# Patient Record
Sex: FEMALE | Race: WHITE | NOT HISPANIC OR LATINO | Employment: FULL TIME | URBAN - METROPOLITAN AREA
[De-identification: names, ages, dates, MRNs, and addresses within clinical notes are randomized per-mention and may not be internally consistent; named-entity substitution may affect disease eponyms.]

---

## 2017-07-14 ENCOUNTER — ALLSCRIPTS OFFICE VISIT (OUTPATIENT)
Dept: OTHER | Facility: OTHER | Age: 42
End: 2017-07-14

## 2017-12-07 ENCOUNTER — ALLSCRIPTS OFFICE VISIT (OUTPATIENT)
Dept: OTHER | Facility: OTHER | Age: 42
End: 2017-12-07

## 2017-12-08 NOTE — PROGRESS NOTES
Assessment    1  Acute upper respiratory infection (465 9) (J06 9)    Plan  Acute upper respiratory infection    · Amoxicillin 500 MG Oral Capsule; TAKE 2 CAPSULES TWICE DAILY UNTIL GONE   · Fluticasone Propionate 50 MCG/ACT Nasal Suspension; USE 2 SPRAYS INEACH NOSTRIL ONCE DAILY   · Ventolin  (90 Base) MCG/ACT Inhalation Aerosol Solution; INHALE 2PUFFS EVERY 6 HOURS AS NEEDED FOR WHEEZING   · Follow Up if Not Better Evaluation and Treatment  Follow-up  Status: Complete  Done:[id number]  PMH: Encounter for screening for malignant neoplasm of colon, PMH: Exercisecounseling, PMH: Eye irritation, FamHx: Family history of cardiac disorder, FamHx:Family history of colon cancer, PMH: Generalized anxiety disorder, PMH: History ofallergic rhinitis, PMH: History of Papanicolaou smear, PMH: History of scabies, PMH:History of vertigo, PMH: History of viral infection, PMH: Screening for depression    · (1) PAP SMEAR CONVENTIONAL; Status:Temporary Deferral - Patient reports itemrecently done,calling for report;    12/7/2018  Maturation index required? : No    Discussion/Summary    Rx for fluticasone nose spray and Ventolin  Push fluids  Get a room humidifier  May get Robitussin DM over the counter  If symptoms continue, start amoxicillin  Call us if any problems  Possible side effects of new medications were reviewed with the patient/guardian today  The treatment plan was reviewed with the patient/guardian  The patient/guardian understands and agrees with the treatment plan      Chief Complaint    1  Cold Symptoms  Cold      History of Present Illness  HPI: Cold for the past 2 1/2 weeks  Past couple days cough with yellow sputum  No F/C  Patient 35 weeks pregnant  Cold Symptoms:   Christi Hopson presents with complaints of constant episodes of moderate cold symptoms  Episodes started about 2 weeks ago  She is currently experiencing cold symptoms  Symptoms are worsening        Review of Systems   Constitutional: No fever, no chills, feels well, no tiredness, no recent weight gain or loss  ENT: as noted in HPI  Cardiovascular: no complaints of slow or fast heart rate, no chest pain, no palpitations, no leg claudication or lower extremity edema  Respiratory: as noted in HPI  Gastrointestinal: no complaints of abdominal pain, no constipation, no nausea or diarrhea, no vomiting, no bloody stools  Genitourinary: no complaints of dysuria, no incontinence, no pelvic pain, no dysmenorrhea, no vaginal discharge or abnormal vaginal bleeding  Active Problems  1  Acute sinusitis (461 9) (J01 90)   2  Increased BMI (783 9) (R63 8)   3  Need for diphtheria-tetanus-pertussis (Tdap) vaccine (V06 1) (Z23)    Past Medical History    1  Acute upper respiratory infection (465 9) (J06 9)   2  Acute upper respiratory infection (465 9) (J06 9)   3  History of Acute upper respiratory infection (465 9) (J06 9)   4  History of Acute UTI (599 0) (N39 0)   5  History of Bacterial vaginosis (616 10,041 9) (N76 0,B96 89)   6  History of Bug bite (919 4,E906 4) (W57 XXXA)   7  History of Cellulitis of right lower extremity (682 6) (L03 115)   8  History of Dysfunction of left eustachian tube (381 81) (H69 82)   9  History of Encounter for mammogram to establish baseline mammogram (V76 12) (Z12 31)   10  History of Encounter for screening for cardiovascular disorders (V81 2) (Z13 6)   11  History of Encounter for screening for malignant neoplasm of colon (V76 51) (Z12 11)   12  History of Exercise counseling (V65 41) (Z71 82)   13  History of Eye irritation (379 99) (H57 8)   14  History of Generalized anxiety disorder (300 02) (F41 1)   15  History of allergic rhinitis (V12 69) (Z87 09)   16  History of Papanicolaou smear (V45 89) (Z98 890)   17  History of scabies (V12 09) (Z86 19)   18  History of vertigo (V12 49) (Z87 898)   19  History of viral infection (V12 09) (Z86 19)   20   History of Screening for depression (V79 0) (Z13 89)  Active Problems And Past Medical History Reviewed: The active problems and past medical history were reviewed and updated today  Family History  Mother    1  Family history of colon cancer (V16 0) (Z80 0)   2  Family history of polyps in the colon (V18 51) (Z83 71)  Father    3  Family history of cardiac disorder (V17 49) (Z82 49)  Maternal Grandmother    4  Family history of colon cancer (V16 0) (Z80 0)  Maternal Cousin    5  Family history of colon cancer (V16 0) (Z80 0)  Family History Reviewed: The family history was reviewed and updated today  Social History     · Being A Social Drinker   · Former smoker (P20 11) (P85 292)  The social history was reviewed and updated today  The social history was reviewed and is unchanged  Surgical History  Surgical History Reviewed: The surgical history was reviewed and updated today  Current Meds   1  Meclizine HCl - 25 MG Oral Tablet; TAKE 1 TABLET 4 TIMES DAILY AS NEEDED FOR DIZZINESS; Therapy: 17HKG6286 to (Evaluate:38Pfi9527)  Requested for: 75Zoq7784; Last Rx:97Wtz3078 Ordered    The medication list was reviewed and updated today  Allergies  1  No Known Drug Allergies    Vitals   Recorded: 08OAI5868 01:40PM   Temperature 00 6 F   Systolic 606   Diastolic 82   Height 5 ft 10 in   Weight 193 lb 4 oz   BMI Calculated 27 73   BSA Calculated 2 06       Physical Exam   Constitutional  General appearance: No acute distress, well appearing and well nourished  Ears, Nose, Mouth, and Throat  Otoscopic examination: Tympanic membranes translucent with normal light reflex  Canals patent without erythema  Nasal mucosa, septum, and turbinates: Abnormal   There was clear rhinorrhea from both nares  The bilateral nasal mucosa was boggy  Oropharynx: Abnormal   The posterior pharynx Clear postnasal drip  Pulmonary  Respiratory effort: No increased work of breathing or signs of respiratory distress  Auscultation of lungs: Clear to auscultation  Cardiovascular  Auscultation of heart: Normal rate and rhythm, normal S1 and S2, without murmurs     Examination of extremities for edema and/or varicosities: Normal    Psychiatric  Orientation to person, place, and time: Normal    Mood and affect: Normal          Signatures   Electronically signed by : Miracle Mack DO; Dec  7 2017  2:07PM EST                       (Author)

## 2018-01-13 VITALS
WEIGHT: 176.5 LBS | HEIGHT: 70 IN | BODY MASS INDEX: 25.27 KG/M2 | TEMPERATURE: 97.5 F | SYSTOLIC BLOOD PRESSURE: 102 MMHG | DIASTOLIC BLOOD PRESSURE: 68 MMHG

## 2018-01-22 VITALS
WEIGHT: 193.25 LBS | HEIGHT: 70 IN | BODY MASS INDEX: 27.67 KG/M2 | DIASTOLIC BLOOD PRESSURE: 82 MMHG | TEMPERATURE: 97.2 F | SYSTOLIC BLOOD PRESSURE: 120 MMHG

## 2018-10-10 ENCOUNTER — TELEPHONE (OUTPATIENT)
Dept: FAMILY MEDICINE CLINIC | Facility: CLINIC | Age: 43
End: 2018-10-10

## 2018-10-10 DIAGNOSIS — R30.0 DYSURIA: Primary | ICD-10-CM

## 2018-10-10 RX ORDER — NITROFURANTOIN 25; 75 MG/1; MG/1
100 CAPSULE ORAL 2 TIMES DAILY
Qty: 10 CAPSULE | Refills: 0 | Status: SHIPPED | OUTPATIENT
Start: 2018-10-10 | End: 2018-10-15

## 2019-01-08 ENCOUNTER — TRANSCRIBE ORDERS (OUTPATIENT)
Dept: LAB | Facility: CLINIC | Age: 44
End: 2019-01-08

## 2019-01-08 ENCOUNTER — APPOINTMENT (OUTPATIENT)
Dept: LAB | Facility: CLINIC | Age: 44
End: 2019-01-08

## 2019-01-08 DIAGNOSIS — Z02.1 PRE-EMPLOYMENT EXAMINATION: Primary | ICD-10-CM

## 2019-01-08 DIAGNOSIS — Z02.1 PRE-EMPLOYMENT EXAMINATION: ICD-10-CM

## 2019-01-08 LAB — RUBV IGG SERPL IA-ACNC: 34.9 IU/ML

## 2019-01-08 PROCEDURE — 86735 MUMPS ANTIBODY: CPT

## 2019-01-08 PROCEDURE — 86765 RUBEOLA ANTIBODY: CPT

## 2019-01-08 PROCEDURE — 86762 RUBELLA ANTIBODY: CPT

## 2019-01-08 PROCEDURE — 86480 TB TEST CELL IMMUN MEASURE: CPT

## 2019-01-08 PROCEDURE — 86787 VARICELLA-ZOSTER ANTIBODY: CPT

## 2019-01-08 PROCEDURE — 87340 HEPATITIS B SURFACE AG IA: CPT

## 2019-01-08 PROCEDURE — 36415 COLL VENOUS BLD VENIPUNCTURE: CPT

## 2019-01-09 LAB — HBV SURFACE AG SER QL: NORMAL

## 2019-01-10 LAB
GAMMA INTERFERON BACKGROUND BLD IA-ACNC: 0.01 IU/ML
M TB IFN-G BLD-IMP: NEGATIVE
M TB IFN-G CD4+ BCKGRND COR BLD-ACNC: 0 IU/ML
M TB IFN-G CD4+ BCKGRND COR BLD-ACNC: 0 IU/ML
MEV IGG SER QL: ABNORMAL
MITOGEN IGNF BCKGRD COR BLD-ACNC: 6.86 IU/ML
MUV IGG SER QL: ABNORMAL
VZV IGG SER IA-ACNC: NORMAL

## 2019-04-02 ENCOUNTER — APPOINTMENT (OUTPATIENT)
Dept: LAB | Facility: MEDICAL CENTER | Age: 44
End: 2019-04-02
Payer: COMMERCIAL

## 2019-04-02 ENCOUNTER — OFFICE VISIT (OUTPATIENT)
Dept: FAMILY MEDICINE CLINIC | Facility: CLINIC | Age: 44
End: 2019-04-02
Payer: COMMERCIAL

## 2019-04-02 VITALS
BODY MASS INDEX: 25.48 KG/M2 | HEIGHT: 70 IN | DIASTOLIC BLOOD PRESSURE: 72 MMHG | WEIGHT: 178 LBS | SYSTOLIC BLOOD PRESSURE: 120 MMHG

## 2019-04-02 DIAGNOSIS — R10.13 EPIGASTRIC PAIN: Primary | ICD-10-CM

## 2019-04-02 LAB
ALBUMIN SERPL BCP-MCNC: 4 G/DL (ref 3.5–5)
ALP SERPL-CCNC: 61 U/L (ref 46–116)
ALT SERPL W P-5'-P-CCNC: 22 U/L (ref 12–78)
ANION GAP SERPL CALCULATED.3IONS-SCNC: 3 MMOL/L (ref 4–13)
AST SERPL W P-5'-P-CCNC: 11 U/L (ref 5–45)
BASOPHILS # BLD AUTO: 0.03 THOUSANDS/ΜL (ref 0–0.1)
BASOPHILS NFR BLD AUTO: 0 % (ref 0–1)
BILIRUB SERPL-MCNC: 0.68 MG/DL (ref 0.2–1)
BUN SERPL-MCNC: 10 MG/DL (ref 5–25)
CALCIUM SERPL-MCNC: 9.2 MG/DL (ref 8.3–10.1)
CHLORIDE SERPL-SCNC: 106 MMOL/L (ref 100–108)
CO2 SERPL-SCNC: 28 MMOL/L (ref 21–32)
CREAT SERPL-MCNC: 0.64 MG/DL (ref 0.6–1.3)
EOSINOPHIL # BLD AUTO: 0.07 THOUSAND/ΜL (ref 0–0.61)
EOSINOPHIL NFR BLD AUTO: 1 % (ref 0–6)
ERYTHROCYTE [DISTWIDTH] IN BLOOD BY AUTOMATED COUNT: 11.9 % (ref 11.6–15.1)
GFR SERPL CREATININE-BSD FRML MDRD: 110 ML/MIN/1.73SQ M
GLUCOSE SERPL-MCNC: 90 MG/DL (ref 65–140)
HCT VFR BLD AUTO: 46.1 % (ref 34.8–46.1)
HGB BLD-MCNC: 14.7 G/DL (ref 11.5–15.4)
IMM GRANULOCYTES # BLD AUTO: 0.03 THOUSAND/UL (ref 0–0.2)
IMM GRANULOCYTES NFR BLD AUTO: 0 % (ref 0–2)
LIPASE SERPL-CCNC: 137 U/L (ref 73–393)
LYMPHOCYTES # BLD AUTO: 1.55 THOUSANDS/ΜL (ref 0.6–4.47)
LYMPHOCYTES NFR BLD AUTO: 21 % (ref 14–44)
MCH RBC QN AUTO: 30.1 PG (ref 26.8–34.3)
MCHC RBC AUTO-ENTMCNC: 31.9 G/DL (ref 31.4–37.4)
MCV RBC AUTO: 94 FL (ref 82–98)
MONOCYTES # BLD AUTO: 0.79 THOUSAND/ΜL (ref 0.17–1.22)
MONOCYTES NFR BLD AUTO: 11 % (ref 4–12)
NEUTROPHILS # BLD AUTO: 4.82 THOUSANDS/ΜL (ref 1.85–7.62)
NEUTS SEG NFR BLD AUTO: 67 % (ref 43–75)
NRBC BLD AUTO-RTO: 0 /100 WBCS
PLATELET # BLD AUTO: 255 THOUSANDS/UL (ref 149–390)
PMV BLD AUTO: 11.1 FL (ref 8.9–12.7)
POTASSIUM SERPL-SCNC: 3.8 MMOL/L (ref 3.5–5.3)
PROT SERPL-MCNC: 8.1 G/DL (ref 6.4–8.2)
RBC # BLD AUTO: 4.89 MILLION/UL (ref 3.81–5.12)
SODIUM SERPL-SCNC: 137 MMOL/L (ref 136–145)
WBC # BLD AUTO: 7.29 THOUSAND/UL (ref 4.31–10.16)

## 2019-04-02 PROCEDURE — 1036F TOBACCO NON-USER: CPT | Performed by: FAMILY MEDICINE

## 2019-04-02 PROCEDURE — 99213 OFFICE O/P EST LOW 20 MIN: CPT | Performed by: FAMILY MEDICINE

## 2019-04-02 PROCEDURE — 85025 COMPLETE CBC W/AUTO DIFF WBC: CPT | Performed by: FAMILY MEDICINE

## 2019-04-02 PROCEDURE — 83690 ASSAY OF LIPASE: CPT | Performed by: FAMILY MEDICINE

## 2019-04-02 PROCEDURE — 36415 COLL VENOUS BLD VENIPUNCTURE: CPT | Performed by: FAMILY MEDICINE

## 2019-04-02 PROCEDURE — 80053 COMPREHEN METABOLIC PANEL: CPT | Performed by: FAMILY MEDICINE

## 2019-04-02 PROCEDURE — 3008F BODY MASS INDEX DOCD: CPT | Performed by: FAMILY MEDICINE

## 2019-04-02 RX ORDER — ALBUTEROL SULFATE 90 UG/1
2 AEROSOL, METERED RESPIRATORY (INHALATION) EVERY 6 HOURS PRN
COMMUNITY
Start: 2013-04-01 | End: 2019-04-02

## 2019-04-02 RX ORDER — FLUTICASONE PROPIONATE 50 MCG
2 SPRAY, SUSPENSION (ML) NASAL DAILY
COMMUNITY
Start: 2015-05-01 | End: 2019-04-02

## 2019-04-02 RX ORDER — MECLIZINE HYDROCHLORIDE 25 MG/1
1 TABLET ORAL 4 TIMES DAILY PRN
COMMUNITY
Start: 2012-12-07 | End: 2019-04-02

## 2019-04-02 RX ORDER — RANITIDINE 150 MG/1
150 TABLET ORAL 2 TIMES DAILY
Qty: 60 TABLET | Refills: 3 | Status: SHIPPED | OUTPATIENT
Start: 2019-04-02 | End: 2020-06-17

## 2019-09-05 ENCOUNTER — TRANSCRIBE ORDERS (OUTPATIENT)
Dept: ADMINISTRATIVE | Facility: HOSPITAL | Age: 44
End: 2019-09-05

## 2019-09-05 ENCOUNTER — APPOINTMENT (OUTPATIENT)
Dept: LAB | Facility: HOSPITAL | Age: 44
End: 2019-09-05

## 2019-09-05 DIAGNOSIS — Z00.8 HEALTH EXAMINATION IN POPULATION SURVEY: Primary | ICD-10-CM

## 2019-09-05 DIAGNOSIS — Z00.8 HEALTH EXAMINATION IN POPULATION SURVEY: ICD-10-CM

## 2019-09-05 LAB
CHOLEST SERPL-MCNC: 180 MG/DL (ref 50–200)
EST. AVERAGE GLUCOSE BLD GHB EST-MCNC: 108 MG/DL
HBA1C MFR BLD: 5.4 % (ref 4.2–6.3)
HDLC SERPL-MCNC: 43 MG/DL (ref 40–60)
LDLC SERPL CALC-MCNC: 110 MG/DL (ref 0–100)
NONHDLC SERPL-MCNC: 137 MG/DL
TRIGL SERPL-MCNC: 135 MG/DL

## 2019-09-05 PROCEDURE — 36415 COLL VENOUS BLD VENIPUNCTURE: CPT

## 2019-09-05 PROCEDURE — 80061 LIPID PANEL: CPT

## 2019-09-05 PROCEDURE — 83036 HEMOGLOBIN GLYCOSYLATED A1C: CPT

## 2019-11-26 ENCOUNTER — OFFICE VISIT (OUTPATIENT)
Dept: FAMILY MEDICINE CLINIC | Facility: CLINIC | Age: 44
End: 2019-11-26
Payer: COMMERCIAL

## 2019-11-26 VITALS
BODY MASS INDEX: 25.48 KG/M2 | WEIGHT: 178 LBS | HEIGHT: 70 IN | SYSTOLIC BLOOD PRESSURE: 116 MMHG | DIASTOLIC BLOOD PRESSURE: 80 MMHG

## 2019-11-26 DIAGNOSIS — F41.9 ANXIETY: Primary | ICD-10-CM

## 2019-11-26 PROCEDURE — 99213 OFFICE O/P EST LOW 20 MIN: CPT | Performed by: FAMILY MEDICINE

## 2019-11-26 PROCEDURE — 1036F TOBACCO NON-USER: CPT | Performed by: FAMILY MEDICINE

## 2019-11-26 NOTE — PROGRESS NOTES
Assessment/Plan: We will start her on Zoloft 25 mg daily for couple days then 50 mg daily  Patient will call her work to see if she can have any time off to be with her mother  We will see her back in the next 3-4 weeks or p r n  Problem List Items Addressed This Visit        Other    Anxiety - Primary    Relevant Medications    sertraline (ZOLOFT) 50 mg tablet           Diagnoses and all orders for this visit:    Anxiety  -     sertraline (ZOLOFT) 50 mg tablet; Take 1 tablet (50 mg total) by mouth daily        No problem-specific Assessment & Plan notes found for this encounter  Subjective:      Patient ID: Gracie Barnett is a 40 y o  female  Patient here today for anxiety  Patient's mom is on hospice  Patient has not been sleeping well and having anxiety attacks  She denies any SI or HI  Anxiety   Presents for initial visit  Onset was 1 to 4 weeks ago  The problem has been gradually worsening  Symptoms include insomnia, nervous/anxious behavior and panic  Patient reports no suicidal ideas  Symptoms occur constantly  The severity of symptoms is moderate  The quality of sleep is poor  Nighttime awakenings: several      Past treatments include nothing  The following portions of the patient's history were reviewed and updated as appropriate:   She has a past medical history of Allergic rhinitis and Generalized anxiety disorder  ,  does not have any pertinent problems on file  ,   has no past surgical history on file  ,  family history includes Colon cancer in her cousin, maternal grandmother, and mother; Colon polyps in her mother; Heart disease in her father  ,   reports that she has quit smoking  She has never used smokeless tobacco  She reports that she drinks alcohol  She reports that she does not use drugs  ,  has No Known Allergies     Current Outpatient Medications   Medication Sig Dispense Refill    ranitidine (ZANTAC) 150 mg tablet Take 1 tablet (150 mg total) by mouth 2 (two) times a day (Patient not taking: Reported on 11/26/2019) 60 tablet 3    sertraline (ZOLOFT) 50 mg tablet Take 1 tablet (50 mg total) by mouth daily 30 tablet 1     No current facility-administered medications for this visit  Review of Systems   Constitutional: Negative  Respiratory: Negative  Cardiovascular: Negative  Gastrointestinal: Negative  Genitourinary: Negative  Psychiatric/Behavioral: Negative for suicidal ideas  The patient is nervous/anxious and has insomnia  Objective:  Vitals:    11/26/19 0848   BP: 116/80   Weight: 80 7 kg (178 lb)   Height: 5' 10" (1 778 m)     Body mass index is 25 54 kg/m²  Physical Exam   Constitutional: She is oriented to person, place, and time  She appears well-developed and well-nourished  No distress  HENT:   Head: Normocephalic and atraumatic  Eyes: Conjunctivae are normal    Neurological: She is alert and oriented to person, place, and time  Skin: She is not diaphoretic  Psychiatric: She has a normal mood and affect  Her behavior is normal  Judgment and thought content normal    Vitals reviewed

## 2019-12-19 ENCOUNTER — OFFICE VISIT (OUTPATIENT)
Dept: FAMILY MEDICINE CLINIC | Facility: CLINIC | Age: 44
End: 2019-12-19
Payer: COMMERCIAL

## 2019-12-19 VITALS
SYSTOLIC BLOOD PRESSURE: 128 MMHG | HEART RATE: 104 BPM | BODY MASS INDEX: 27.26 KG/M2 | OXYGEN SATURATION: 96 % | HEIGHT: 70 IN | DIASTOLIC BLOOD PRESSURE: 90 MMHG | WEIGHT: 190.4 LBS

## 2019-12-19 DIAGNOSIS — Z63.4 DEATH OF PARENT: ICD-10-CM

## 2019-12-19 DIAGNOSIS — Z12.4 ENCOUNTER FOR SCREENING FOR CERVICAL CANCER: ICD-10-CM

## 2019-12-19 DIAGNOSIS — Z80.49 FAMILY HISTORY OF UTERINE CANCER: ICD-10-CM

## 2019-12-19 DIAGNOSIS — Z12.31 ENCOUNTER FOR SCREENING MAMMOGRAM FOR BREAST CANCER: ICD-10-CM

## 2019-12-19 DIAGNOSIS — F43.21 GRIEF: Primary | ICD-10-CM

## 2019-12-19 PROCEDURE — 3008F BODY MASS INDEX DOCD: CPT | Performed by: PHYSICIAN ASSISTANT

## 2019-12-19 PROCEDURE — 99213 OFFICE O/P EST LOW 20 MIN: CPT | Performed by: PHYSICIAN ASSISTANT

## 2019-12-19 PROCEDURE — 1036F TOBACCO NON-USER: CPT | Performed by: PHYSICIAN ASSISTANT

## 2019-12-19 SDOH — SOCIAL STABILITY - SOCIAL INSECURITY: DISSAPEARANCE AND DEATH OF FAMILY MEMBER: Z63.4

## 2019-12-19 NOTE — PROGRESS NOTES
Assessment/Plan:    Problem List Items Addressed This Visit     None      Visit Diagnoses     Grief    -  Primary    Death of parent        Encounter for screening mammogram for breast cancer        Relevant Orders    Mammo screening bilateral w 3d & cad    Encounter for screening for cervical cancer         Relevant Orders    Ambulatory referral to Gynecology    Family history of uterine cancer        Relevant Orders    Ambulatory referral to Gynecology           Diagnoses and all orders for this visit:    Grief    Death of parent    Encounter for screening mammogram for breast cancer  -     Mammo screening bilateral w 3d & cad; Future    Encounter for screening for cervical cancer   -     Cancel: Ambulatory referral to Gynecology; Future  -     Ambulatory referral to Gynecology; Future    Family history of uterine cancer  -     Ambulatory referral to Gynecology; Future        Will excuse from work -, and she will take 3 bereavement days which will make her RTW date 20  Note stating the same provided to her today  Referral placed to GYN as she is due and she is interested in genetic testing for herself  Subjective:      Patient ID: Bishop Lopez is a 40 y o  female  Bobby Orn is here today due to grief of her mother's passing  She passed away yesterday from an aggressive form of uterine CA which had metastasized rapidly  Bobby Orn needs time off of work to grieve and take care of  arrangements  The following portions of the patient's history were reviewed and updated as appropriate:   She has a past medical history of Allergic rhinitis and Generalized anxiety disorder  ,  does not have any pertinent problems on file  ,   has no past surgical history on file  ,  family history includes Colon cancer in her cousin, maternal grandmother, and mother; Colon polyps in her mother; Heart disease in her father; Uterine cancer in her mother  ,   reports that she has quit smoking   She has never used smokeless tobacco  She reports that she drank alcohol  She reports that she does not use drugs  ,  has No Known Allergies     Current Outpatient Medications   Medication Sig Dispense Refill    ranitidine (ZANTAC) 150 mg tablet Take 1 tablet (150 mg total) by mouth 2 (two) times a day (Patient not taking: Reported on 11/26/2019) 60 tablet 3    sertraline (ZOLOFT) 50 mg tablet Take 1 tablet (50 mg total) by mouth daily (Patient not taking: Reported on 12/19/2019) 30 tablet 1     No current facility-administered medications for this visit  Review of Systems   Constitutional: Positive for appetite change  Negative for activity change, chills, diaphoresis, fatigue, fever and unexpected weight change  HENT: Negative for congestion, ear pain, postnasal drip, rhinorrhea, sinus pressure, sinus pain, sneezing, sore throat, tinnitus and voice change  Eyes: Negative for pain, redness and visual disturbance  Respiratory: Negative for cough, chest tightness, shortness of breath and wheezing  Cardiovascular: Negative for chest pain, palpitations and leg swelling  Gastrointestinal: Negative for abdominal pain, blood in stool, constipation, diarrhea, nausea and vomiting  Genitourinary: Negative for difficulty urinating, dysuria, frequency, hematuria and urgency  Musculoskeletal: Negative for arthralgias, back pain, gait problem, joint swelling, myalgias, neck pain and neck stiffness  Skin: Negative for color change, pallor, rash and wound  Neurological: Negative for dizziness, tremors, weakness, light-headedness and headaches  Psychiatric/Behavioral: Positive for dysphoric mood and sleep disturbance  Negative for self-injury and suicidal ideas  The patient is not nervous/anxious           Tearful, grieving         Objective:  Vitals:    12/19/19 0727   BP: 128/90   BP Location: Left arm   Patient Position: Sitting   Pulse: 104   SpO2: 96%   Weight: 86 4 kg (190 lb 6 4 oz)   Height: 5' 10" (1 778 m) Body mass index is 27 32 kg/m²  Physical Exam   Constitutional: She is oriented to person, place, and time  She appears well-developed and well-nourished  She appears distressed (tearful, grieving)  HENT:   Head: Normocephalic and atraumatic  Right Ear: Hearing, tympanic membrane, external ear and ear canal normal    Left Ear: Hearing, tympanic membrane, external ear and ear canal normal    Mouth/Throat: Uvula is midline, oropharynx is clear and moist and mucous membranes are normal  No oropharyngeal exudate  Eyes: Conjunctivae are normal  Right eye exhibits no discharge  Left eye exhibits no discharge  No scleral icterus  Neck: Neck supple  Carotid bruit is not present  No thyromegaly present  Cardiovascular: Normal rate, regular rhythm and normal heart sounds  No murmur heard  Pulmonary/Chest: Effort normal and breath sounds normal  No respiratory distress  She has no wheezes  Abdominal: Soft  Bowel sounds are normal  She exhibits no distension and no mass  There is no tenderness  There is no rebound and no guarding  Musculoskeletal: Normal range of motion  She exhibits no edema or tenderness  Lymphadenopathy:     She has no cervical adenopathy  Neurological: She is alert and oriented to person, place, and time  Skin: Skin is warm and dry  No rash noted  She is not diaphoretic  No erythema  Psychiatric: Judgment normal  Cognition and memory are normal  She exhibits a depressed mood  Vitals reviewed

## 2019-12-19 NOTE — LETTER
December 19, 2019     Patient: Frankey Kill   YOB: 1975   Date of Visit: 12/19/2019       To Whom it May Concern:    Rozina Cage is under my professional care  She was seen in my office on 12/19/2019  She will be excused from work 12/18/19-12/26/19, followed by 3 bereavement days 12/27/19, 12/30/19, and 12/31/19  She will return to work 1/2/20  If you have any questions or concerns, please don't hesitate to call           Sincerely,          Brandon iRng PA-C        CC: No Recipients

## 2020-06-11 ENCOUNTER — TELEPHONE (OUTPATIENT)
Dept: OTHER | Facility: OTHER | Age: 45
End: 2020-06-11

## 2020-06-12 ENCOUNTER — TELEPHONE (OUTPATIENT)
Dept: OTHER | Facility: OTHER | Age: 45
End: 2020-06-12

## 2020-06-17 ENCOUNTER — OFFICE VISIT (OUTPATIENT)
Dept: FAMILY MEDICINE CLINIC | Facility: CLINIC | Age: 45
End: 2020-06-17
Payer: COMMERCIAL

## 2020-06-17 VITALS
TEMPERATURE: 98.6 F | WEIGHT: 199 LBS | DIASTOLIC BLOOD PRESSURE: 76 MMHG | HEIGHT: 70 IN | BODY MASS INDEX: 28.49 KG/M2 | SYSTOLIC BLOOD PRESSURE: 114 MMHG

## 2020-06-17 DIAGNOSIS — J01.90 ACUTE NON-RECURRENT SINUSITIS, UNSPECIFIED LOCATION: ICD-10-CM

## 2020-06-17 DIAGNOSIS — H60.502 ACUTE OTITIS EXTERNA OF LEFT EAR, UNSPECIFIED TYPE: Primary | ICD-10-CM

## 2020-06-17 DIAGNOSIS — Z00.00 HEALTHCARE MAINTENANCE: Primary | ICD-10-CM

## 2020-06-17 PROCEDURE — 1036F TOBACCO NON-USER: CPT | Performed by: FAMILY MEDICINE

## 2020-06-17 PROCEDURE — 3008F BODY MASS INDEX DOCD: CPT | Performed by: FAMILY MEDICINE

## 2020-06-17 PROCEDURE — 99213 OFFICE O/P EST LOW 20 MIN: CPT | Performed by: FAMILY MEDICINE

## 2020-06-17 RX ORDER — FLUTICASONE PROPIONATE 50 MCG
2 SPRAY, SUSPENSION (ML) NASAL DAILY
Qty: 16 G | Refills: 1 | Status: SHIPPED | OUTPATIENT
Start: 2020-06-17 | End: 2021-08-17

## 2020-06-17 RX ORDER — AMOXICILLIN AND CLAVULANATE POTASSIUM 875; 125 MG/1; MG/1
1 TABLET, FILM COATED ORAL EVERY 12 HOURS SCHEDULED
Qty: 14 TABLET | Refills: 0 | Status: SHIPPED | OUTPATIENT
Start: 2020-06-17 | End: 2020-06-24

## 2020-06-17 RX ORDER — NEOMYCIN SULFATE, POLYMYXIN B SULFATE AND HYDROCORTISONE 10; 3.5; 1 MG/ML; MG/ML; [USP'U]/ML
4 SUSPENSION/ DROPS AURICULAR (OTIC) 4 TIMES DAILY
Qty: 10 ML | Refills: 0 | Status: SHIPPED | OUTPATIENT
Start: 2020-06-17 | End: 2021-08-17

## 2020-06-23 ENCOUNTER — TELEPHONE (OUTPATIENT)
Dept: FAMILY MEDICINE CLINIC | Facility: CLINIC | Age: 45
End: 2020-06-23

## 2020-06-23 DIAGNOSIS — J01.90 ACUTE NON-RECURRENT SINUSITIS, UNSPECIFIED LOCATION: Primary | ICD-10-CM

## 2020-06-23 RX ORDER — AZITHROMYCIN 250 MG/1
TABLET, FILM COATED ORAL
Qty: 6 TABLET | Refills: 0 | Status: SHIPPED | OUTPATIENT
Start: 2020-06-23 | End: 2020-06-27

## 2020-08-04 ENCOUNTER — APPOINTMENT (OUTPATIENT)
Dept: LAB | Facility: MEDICAL CENTER | Age: 45
End: 2020-08-04
Payer: COMMERCIAL

## 2020-08-04 LAB
CHOLEST SERPL-MCNC: 194 MG/DL (ref 50–200)
EST. AVERAGE GLUCOSE BLD GHB EST-MCNC: 114 MG/DL
HBA1C MFR BLD: 5.6 %
HDLC SERPL-MCNC: 44 MG/DL
LDLC SERPL CALC-MCNC: 122 MG/DL (ref 0–100)
TRIGL SERPL-MCNC: 140 MG/DL

## 2020-08-04 PROCEDURE — 83036 HEMOGLOBIN GLYCOSYLATED A1C: CPT | Performed by: FAMILY MEDICINE

## 2020-08-04 PROCEDURE — 80061 LIPID PANEL: CPT | Performed by: FAMILY MEDICINE

## 2020-08-04 PROCEDURE — 36415 COLL VENOUS BLD VENIPUNCTURE: CPT | Performed by: FAMILY MEDICINE

## 2020-08-19 ENCOUNTER — TELEPHONE (OUTPATIENT)
Dept: FAMILY MEDICINE CLINIC | Facility: CLINIC | Age: 45
End: 2020-08-19

## 2020-08-19 DIAGNOSIS — R30.0 DYSURIA: Primary | ICD-10-CM

## 2020-08-19 RX ORDER — NITROFURANTOIN 25; 75 MG/1; MG/1
100 CAPSULE ORAL 2 TIMES DAILY
Qty: 10 CAPSULE | Refills: 0 | Status: SHIPPED | OUTPATIENT
Start: 2020-08-19 | End: 2020-08-24

## 2020-12-24 ENCOUNTER — IMMUNIZATIONS (OUTPATIENT)
Dept: FAMILY MEDICINE CLINIC | Facility: HOSPITAL | Age: 45
End: 2020-12-24
Payer: COMMERCIAL

## 2020-12-24 DIAGNOSIS — Z23 ENCOUNTER FOR IMMUNIZATION: ICD-10-CM

## 2020-12-24 PROCEDURE — 91301 SARS-COV-2 / COVID-19 MRNA VACCINE (MODERNA) 100 MCG: CPT

## 2020-12-24 PROCEDURE — 0011A SARS-COV-2 / COVID-19 MRNA VACCINE (MODERNA) 100 MCG: CPT

## 2020-12-28 ENCOUNTER — TELEPHONE (OUTPATIENT)
Dept: FAMILY MEDICINE CLINIC | Facility: CLINIC | Age: 45
End: 2020-12-28

## 2020-12-28 DIAGNOSIS — K04.7 TOOTH INFECTION: Primary | ICD-10-CM

## 2020-12-28 RX ORDER — CEPHALEXIN 500 MG/1
1000 CAPSULE ORAL EVERY 12 HOURS SCHEDULED
Qty: 28 CAPSULE | Refills: 0 | Status: SHIPPED | OUTPATIENT
Start: 2020-12-28 | End: 2021-01-04

## 2021-01-25 ENCOUNTER — IMMUNIZATIONS (OUTPATIENT)
Dept: FAMILY MEDICINE CLINIC | Facility: HOSPITAL | Age: 46
End: 2021-01-25

## 2021-01-25 DIAGNOSIS — Z23 ENCOUNTER FOR IMMUNIZATION: Primary | ICD-10-CM

## 2021-01-25 PROCEDURE — 0012A SARS-COV-2 / COVID-19 MRNA VACCINE (MODERNA) 100 MCG: CPT

## 2021-01-25 PROCEDURE — 91301 SARS-COV-2 / COVID-19 MRNA VACCINE (MODERNA) 100 MCG: CPT

## 2021-05-06 ENCOUNTER — TELEPHONE (OUTPATIENT)
Dept: FAMILY MEDICINE CLINIC | Facility: CLINIC | Age: 46
End: 2021-05-06

## 2021-05-06 DIAGNOSIS — R42 DIZZINESS: Primary | ICD-10-CM

## 2021-05-06 NOTE — TELEPHONE ENCOUNTER
HAD A MIGRAINE TWO DAYS AGO, WANTED TO KNOW IF YOU CAN ORDER A FLUID PILL FOR HER  HER MOTHER HAD TAKEN THAT FOR MIGRAINES SHE WOULD GET PRIOR TO HER PERIOD  SHE WON'T TAKE IT ALL THE TIME

## 2021-05-06 NOTE — TELEPHONE ENCOUNTER
Does she get very swollen prior to her menstrual cycle? I am not sure how water pill would help migraine headaches   Does she know the exact pill that her mom was on?

## 2021-05-07 RX ORDER — MECLIZINE HCL 12.5 MG/1
12.5 TABLET ORAL EVERY 8 HOURS PRN
Qty: 30 TABLET | Refills: 0 | Status: SHIPPED | OUTPATIENT
Start: 2021-05-07

## 2021-05-07 NOTE — TELEPHONE ENCOUNTER
SHE IS GOING TO MAKE APPT TO DISCUSS THESE ISSUES BUT SHE WAS HAVING SOME VERTIGO ALSO  CAN YOU ORDER SOMETHING FOR THAT TO HAVE ON HAND FOR NOW?

## 2021-05-27 ENCOUNTER — TRANSCRIBE ORDERS (OUTPATIENT)
Dept: ADMINISTRATIVE | Facility: HOSPITAL | Age: 46
End: 2021-05-27

## 2021-05-27 ENCOUNTER — APPOINTMENT (OUTPATIENT)
Dept: LAB | Facility: HOSPITAL | Age: 46
End: 2021-05-27

## 2021-05-27 DIAGNOSIS — Z00.8 HEALTH EXAMINATION IN POPULATION SURVEY: Primary | ICD-10-CM

## 2021-05-27 DIAGNOSIS — Z00.8 HEALTH EXAMINATION IN POPULATION SURVEY: ICD-10-CM

## 2021-05-27 LAB
CHOLEST SERPL-MCNC: 184 MG/DL (ref 50–200)
EST. AVERAGE GLUCOSE BLD GHB EST-MCNC: 114 MG/DL
HBA1C MFR BLD: 5.6 %
HDLC SERPL-MCNC: 39 MG/DL
LDLC SERPL CALC-MCNC: 117 MG/DL (ref 0–100)
NONHDLC SERPL-MCNC: 145 MG/DL
TRIGL SERPL-MCNC: 140 MG/DL

## 2021-05-27 PROCEDURE — 36415 COLL VENOUS BLD VENIPUNCTURE: CPT

## 2021-05-27 PROCEDURE — 83036 HEMOGLOBIN GLYCOSYLATED A1C: CPT

## 2021-05-27 PROCEDURE — 80061 LIPID PANEL: CPT

## 2021-06-04 ENCOUNTER — OFFICE VISIT (OUTPATIENT)
Dept: OBGYN CLINIC | Facility: CLINIC | Age: 46
End: 2021-06-04
Payer: COMMERCIAL

## 2021-06-04 VITALS
WEIGHT: 203.2 LBS | SYSTOLIC BLOOD PRESSURE: 122 MMHG | HEIGHT: 70 IN | BODY MASS INDEX: 29.09 KG/M2 | DIASTOLIC BLOOD PRESSURE: 82 MMHG

## 2021-06-04 DIAGNOSIS — Z01.419 ROUTINE GYNECOLOGICAL EXAMINATION: Primary | ICD-10-CM

## 2021-06-04 DIAGNOSIS — Z80.0 ENCOUNTER FOR COLONOSCOPY IN PATIENT WITH FAMILY HISTORY OF COLON CANCER: ICD-10-CM

## 2021-06-04 DIAGNOSIS — Z12.11 ENCOUNTER FOR COLONOSCOPY IN PATIENT WITH FAMILY HISTORY OF COLON CANCER: ICD-10-CM

## 2021-06-04 DIAGNOSIS — Z12.31 ENCOUNTER FOR SCREENING MAMMOGRAM FOR BREAST CANCER: ICD-10-CM

## 2021-06-04 DIAGNOSIS — Z12.11 ENCOUNTER FOR SCREENING COLONOSCOPY: ICD-10-CM

## 2021-06-04 DIAGNOSIS — Z80.9 MATERNAL FAMILY HISTORY OF CANCER: ICD-10-CM

## 2021-06-04 DIAGNOSIS — Z12.4 SCREENING FOR MALIGNANT NEOPLASM OF CERVIX: ICD-10-CM

## 2021-06-04 PROCEDURE — S0610 ANNUAL GYNECOLOGICAL EXAMINA: HCPCS | Performed by: ADVANCED PRACTICE MIDWIFE

## 2021-06-04 PROCEDURE — G0145 SCR C/V CYTO,THINLAYER,RESCR: HCPCS | Performed by: ADVANCED PRACTICE MIDWIFE

## 2021-06-04 PROCEDURE — 87624 HPV HI-RISK TYP POOLED RSLT: CPT | Performed by: ADVANCED PRACTICE MIDWIFE

## 2021-06-04 NOTE — PROGRESS NOTES
[facility] [address] [place] [address]    ASSESSMENT/PLAN: Ulysses Linton is a 39 y o  No obstetric history on file  who presents for annual gynecologic exam     Encounter for routine gynecologic examination  - Routine well woman exam completed today  - Cervical Cancer Screening: Current ASCCP Guidelines reviewed  Last Pap: 06/04/2021   - HPV Vaccination status: Not immunized  - STI screening offered including HIV: not indicated based on hx  - Contraceptive counseling discussed  Current contraception: Natural Family Planning   - Breast Cancer Screening: Last Mammogram Not on file, script given today  - for gyn exam in 1 yr    Additional problems addressed at this visit:  1  Routine gynecological examination  -     Liquid-based pap, screening  -     Mammo screening bilateral w 3d & cad; Future; Expected date: 06/04/2021    2  Encounter for colonoscopy in patient with family history of colon cancer  -     Ambulatory referral to Gastroenterology; Future    3  Encounter for screening colonoscopy  -     Ambulatory referral to Gastroenterology; Future  - appointment was scheduled for colonoscopy    4  Encounter for screening mammogram for breast cancer  -     Mammo screening bilateral w 3d & cad; Future; Expected date: 06/04/2021    5  Screening for malignant neoplasm of cervix  -     Liquid-based pap, screening    6  Maternal family history of cancer    Hereditary cancer & genetic counseling brochure given to patient - to schedule appointment for testing      CC: Annual Gynecologic Examination    HPI: Ulysses Linton is a 39 y o  No obstetric history on file  who presents for annual gynecologic examination  Janette Wei presents today for gyn exam  She states that menses are regular and flow moderate  Period started today  Denies concerns  Her last pap smear was 2018, script given for mammogram, and colonoscopy was scheduled today during visit   She states that her mother had a rare form of uterine cancer and that there was a metastasis to colon, history of colon polyps  Her MGM and a maternal cousin also had colon cancer  Maternal cousin (male) was diagnosed at a young age  Based on information that [facility] reviewed she would like to pursue testing for genetic mutation  Sleep pattern 6-7 hours a day, good intake of calcium rich foods, active daily, minimal caffeine intake  The following portions of the patient's history were reviewed and updated as appropriate: allergies, current medications, past family history, past medical history, obstetric history, gynecologic history, past social history, past surgical history and problem list     Review of Systems   Constitutional: Negative for activity change, appetite change, fatigue and fever  Respiratory: Negative for cough and shortness of breath  Cardiovascular: Negative for chest pain, palpitations and leg swelling  Gastrointestinal: Negative for constipation and diarrhea  Genitourinary: Negative for difficulty urinating, frequency, menstrual problem, pelvic pain, vaginal bleeding, vaginal discharge and vaginal pain  Neurological: Negative for light-headedness and headaches  Psychiatric/Behavioral: The patient is not nervous/anxious  Objective:  /82 (BP Location: Right arm, Patient Position: Sitting, Cuff Size: Large)   Ht 5' 10" (1 778 m)   Wt 92 2 kg (203 lb 3 2 oz)   LMP 06/04/2021   BMI 29 16 kg/m²    Physical Exam  Vitals signs reviewed  Constitutional:       Appearance: Normal appearance  HENT:      Head: Normocephalic  Neck:      Musculoskeletal: Normal range of motion  Thyroid: No thyroid mass or thyroid tenderness  Cardiovascular:      Rate and Rhythm: Normal rate and regular rhythm  Heart sounds: Normal heart sounds  Pulmonary:      Effort: Pulmonary effort is normal       Breath sounds: Normal breath sounds     Chest:      Breasts:         Right: No mass, nipple discharge, skin change or tenderness  Left: No mass, nipple discharge, skin change or tenderness  Abdominal:      General: There is no distension  Palpations: There is no mass  Tenderness: There is no abdominal tenderness  There is no guarding  Genitourinary:     General: Normal vulva  Exam position: Lithotomy position  Labia:         Right: No tenderness or lesion  Left: No tenderness or lesion  Vagina: No vaginal discharge, tenderness, bleeding or lesions  Cervix: No discharge, lesion, erythema or cervical bleeding  Uterus: Normal  Not enlarged and not tender  Adnexa:         Right: No mass, tenderness or fullness  Left: No mass, tenderness or fullness  Lymphadenopathy:      Upper Body:      Right upper body: No axillary adenopathy  Left upper body: No axillary adenopathy  Skin:     General: Skin is warm and dry  Neurological:      Mental Status: She is alert     Psychiatric:         Mood and Affect: Mood normal          Behavior: Behavior normal          Judgment: Judgment normal              Stephie Lerner CNM  [facility]  06/04/21 12:59 PM

## 2021-06-07 LAB
HPV HR 12 DNA CVX QL NAA+PROBE: NEGATIVE
HPV16 DNA CVX QL NAA+PROBE: NEGATIVE
HPV18 DNA CVX QL NAA+PROBE: NEGATIVE

## 2021-06-09 LAB
LAB AP GYN PRIMARY INTERPRETATION: NORMAL
Lab: NORMAL

## 2021-06-30 ENCOUNTER — TELEPHONE (OUTPATIENT)
Dept: GASTROENTEROLOGY | Facility: CLINIC | Age: 46
End: 2021-06-30

## 2021-06-30 NOTE — TELEPHONE ENCOUNTER
NGUYỄNM for pt to call back to reschedule her appointment with Dr Gabe Langston to  [facility] on 7/14/   7/15

## 2021-07-07 ENCOUNTER — LAB (OUTPATIENT)
Dept: LAB | Facility: CLINIC | Age: 46
End: 2021-07-07
Payer: COMMERCIAL

## 2021-07-07 ENCOUNTER — CONSULT (OUTPATIENT)
Dept: GASTROENTEROLOGY | Facility: CLINIC | Age: 46
End: 2021-07-07
Payer: COMMERCIAL

## 2021-07-07 VITALS
BODY MASS INDEX: 29.58 KG/M2 | WEIGHT: 206.6 LBS | RESPIRATION RATE: 16 BRPM | TEMPERATURE: 98.6 F | HEIGHT: 70 IN | HEART RATE: 72 BPM | SYSTOLIC BLOOD PRESSURE: 118 MMHG | DIASTOLIC BLOOD PRESSURE: 70 MMHG

## 2021-07-07 DIAGNOSIS — R14.0 BLOATING: ICD-10-CM

## 2021-07-07 DIAGNOSIS — Z12.11 ENCOUNTER FOR SCREENING COLONOSCOPY: ICD-10-CM

## 2021-07-07 DIAGNOSIS — K21.9 GASTROESOPHAGEAL REFLUX DISEASE, UNSPECIFIED WHETHER ESOPHAGITIS PRESENT: ICD-10-CM

## 2021-07-07 DIAGNOSIS — Z80.0 ENCOUNTER FOR COLONOSCOPY IN PATIENT WITH FAMILY HISTORY OF COLON CANCER: ICD-10-CM

## 2021-07-07 DIAGNOSIS — Z11.59 NEED FOR HEPATITIS C SCREENING TEST: ICD-10-CM

## 2021-07-07 DIAGNOSIS — Z12.11 ENCOUNTER FOR COLONOSCOPY IN PATIENT WITH FAMILY HISTORY OF COLON CANCER: ICD-10-CM

## 2021-07-07 DIAGNOSIS — Z80.49 FAMILY HISTORY OF UTERINE CANCER: ICD-10-CM

## 2021-07-07 DIAGNOSIS — Z80.0 FAMILY HISTORY OF COLON CANCER: Primary | ICD-10-CM

## 2021-07-07 LAB
ERYTHROCYTE [DISTWIDTH] IN BLOOD BY AUTOMATED COUNT: 12.2 % (ref 11.6–15.1)
HCT VFR BLD AUTO: 45.6 % (ref 34.8–46.1)
HCV AB SER QL: NORMAL
HGB BLD-MCNC: 14.6 G/DL (ref 11.5–15.4)
MCH RBC QN AUTO: 29.9 PG (ref 26.8–34.3)
MCHC RBC AUTO-ENTMCNC: 32 G/DL (ref 31.4–37.4)
MCV RBC AUTO: 93 FL (ref 82–98)
PLATELET # BLD AUTO: 281 THOUSANDS/UL (ref 149–390)
PMV BLD AUTO: 11 FL (ref 8.9–12.7)
RBC # BLD AUTO: 4.89 MILLION/UL (ref 3.81–5.12)
WBC # BLD AUTO: 8.76 THOUSAND/UL (ref 4.31–10.16)

## 2021-07-07 PROCEDURE — 86255 FLUORESCENT ANTIBODY SCREEN: CPT

## 2021-07-07 PROCEDURE — 83516 IMMUNOASSAY NONANTIBODY: CPT

## 2021-07-07 PROCEDURE — 82784 ASSAY IGA/IGD/IGG/IGM EACH: CPT

## 2021-07-07 PROCEDURE — 85027 COMPLETE CBC AUTOMATED: CPT

## 2021-07-07 PROCEDURE — 86803 HEPATITIS C AB TEST: CPT

## 2021-07-07 PROCEDURE — 36415 COLL VENOUS BLD VENIPUNCTURE: CPT

## 2021-07-07 PROCEDURE — 99244 OFF/OP CNSLTJ NEW/EST MOD 40: CPT | Performed by: INTERNAL MEDICINE

## 2021-07-07 RX ORDER — FAMOTIDINE 20 MG/1
20 TABLET, FILM COATED ORAL DAILY
Qty: 30 TABLET | Refills: 4 | Status: SHIPPED | OUTPATIENT
Start: 2021-07-07 | End: 2021-08-17 | Stop reason: SDUPTHER

## 2021-07-07 NOTE — PATIENT INSTRUCTIONS
Family history of colon cancer  Patient reports that her maternal grandmother  of colon cancer age 46  Her mom had an aggressive form of endometrial cancer in addition to colon cancer and  at age 79  Her family history at this point time does not meet criteria for Solo syndrome  However given her family history she may want to consider meeting with the cancer risk program   She will be scheduled for colonoscopy  She will receive a Colyte preparation in split dose with the 2nd dose completed 3 hours prior to arrival   Two bisacodyl tablets  If colitis not available, then we can try Suprep in split dose  I explained to the patient the procedure of colonoscopy as well as its potential risks which are approximately 3 in 1000 chance of bleeding, infection, and perforation  Perforation may require a surgeon to repair it  I also explained the small but significant chance of missing lesions with colonoscopy which has been reported to be about 5-10%  Family history of uterine cancer  Mom was diagnoses with a aggressive form of uterine cancer  GERD (gastroesophageal reflux disease)  Given that she has noticed increased heartburn and indigestion recently, I did suggest starting famotidine 20 mg daily  She should take this 2 hours prior to bed  If she feels she needs more relief she can increase the dose to 40 mg nightly  Lifestyle modifications for gastroesophageal reflux disease were discussed and include limiting fried and fatty foods, mints, chocolates, carbonated and caffeinated beverages , and alcohol, etc   Avoid lying down for 2-3 hours after meals  If you have nighttime symptoms consider raising the head of the bed up on 4-6 inch blocks  Pillows typically are not useful  If you are overweight, weight loss will be helpful  Bloating  She does report some bloating  This may be related to her healthy diet    She may want to keep a diary of which she eats and how she feels to see if she can identify any food triggers  For completeness check celiac antibody panel  Pt is scheduled with dr Munir Streeter at [facility] on 8/17/21 for a colon, carina went over instructions with pt and she has instructions  Patient is aware she will need a  to and from   She will get a call the day before with an exact time for arrival

## 2021-07-07 NOTE — PROGRESS NOTES
[facility]  Gastroenterology Outpatient Consultation  Patient Gabi Dooley   Age 55 y o  Gender female   MRN: [id number]  [facility] [id number]     ASSESSMENT AND PLAN:   Problem List Items Addressed This Visit        Digestive    GERD (gastroesophageal reflux disease)     Given that she has noticed increased heartburn and indigestion recently, I did suggest starting famotidine 20 mg daily  She should take this 2 hours prior to bed  If she feels she needs more relief she can increase the dose to 40 mg nightly  Lifestyle modifications for gastroesophageal reflux disease were discussed and include limiting fried and fatty foods, mints, chocolates, carbonated and caffeinated beverages , and alcohol, etc   Avoid lying down for 2-3 hours after meals  If you have nighttime symptoms consider raising the head of the bed up on 4-6 inch blocks  Pillows typically are not useful  If you are overweight, weight loss will be helpful  Relevant Medications    famotidine (PEPCID) 20 mg tablet       Other    Family history of colon cancer - Primary     Patient reports that her maternal grandmother  of colon cancer age 46  Her mom had an aggressive form of endometrial cancer in addition to colon cancer and  at age 79  Her family history at this point time does not meet criteria for Solo syndrome  However given her family history she may want to consider meeting with the cancer risk program   She will be scheduled for colonoscopy  She will receive a Colyte preparation in split dose with the 2nd dose completed 3 hours prior to arrival   Two bisacodyl tablets  If colitis not available, then we can try Suprep in split dose  I explained to the patient the procedure of colonoscopy as well as its potential risks which are approximately 3 in 1000 chance of bleeding, infection, and perforation  Perforation may require a surgeon to repair it    I also explained the small but significant chance of missing lesions with colonoscopy which has been reported to be about 5-10%  Relevant Orders    Colonoscopy    Ambulatory referral to Gynecologic Oncology    Family history of uterine cancer     Mom was diagnoses with a aggressive form of uterine cancer  Relevant Orders    Ambulatory referral to Gynecologic Oncology    Bloating     She does report some bloating  This may be related to her healthy diet  She may want to keep a diary of which she eats and how she feels to see if she can identify any food triggers  For completeness check celiac antibody panel  Relevant Orders    CBC (Completed)    Celiac Disease Antibody Profile (Completed)      Other Visit Diagnoses     Encounter for colonoscopy in patient with family history of colon cancer        Encounter for screening colonoscopy        Need for hepatitis C screening test        Relevant Orders    Hepatitis C antibody (Completed)         _____________________________________________________________    HPI:   Laurita Guevara is a delightful 77-year-old woman who I am seeing in the office in consultation for a family history of uterine and colon cancer  Patient also reports having some reflux heartburn ingestion which is relatively new over last 6 months  Her bowel habits are very regular, denies any change in her bowel pattern  There has been no diarrhea, constipation, bleeding or black stools  From an upper GI standpoint she does report some heartburn and indigestion maybe a couple times per week  She has been using more Maalox lately  She did have 1 episode in last 6 months where she had some nocturnal regurgitation  There has been no dysphagia early satiety unintentional weight loss or bleeding or melena  She may use NSAIDs on occasion but not on a regular basis  She does report having some gas and bloating which she feels is on a more regular basis    She does not use much lot of dairy products and does not use any artificial sweeteners  Her mom was diagnosed with a rare form of uterine cancer that was very aggressive  She states she also has colon cancer listed on her death certificate  Mom  at age 79  Maternal grandmother had colon cancer and  at age 46  Maternal cousin had colon polyps in his 45s  She does not smoke tobacco may have an occasional alcoholic beverage  No Known Allergies  Current Outpatient Medications   Medication Sig Dispense Refill    fluticasone (FLONASE) 50 mcg/act nasal spray 2 sprays into each nostril daily 16 g 1    meclizine (ANTIVERT) 12 5 MG tablet Take 1 tablet (12 5 mg total) by mouth every 8 (eight) hours as needed for dizziness 30 tablet 0    famotidine (PEPCID) 20 mg tablet Take 1 tablet (20 mg total) by mouth daily Take 1 tablet 2 hours prior to bed  30 tablet 4    neomycin-polymyxin-hydrocortisone (CORTISPORIN) 0 35%-10,000 units/mL-1% otic suspension Administer 4 drops into ears 4 (four) times a day 10 mL 0    polyethylene glycol (COLYTE) 4000 mL solution Take 240 g by mouth once for 1 dose 4000 mL 0     No current facility-administered medications for this visit       MEDICAL HISTORY:  Past Medical History:   Diagnosis Date    Abnormal Pap smear of cervix     Allergic rhinitis     Resolved 2017     Generalized anxiety disorder     Resolved 2017      Past Surgical History:   Procedure Laterality Date    COLPOSCOPY       Social History     Substance and Sexual Activity   Alcohol Use Yes    Comment: occasional      Social History     Substance and Sexual Activity   Drug Use Never     Social History     Tobacco Use   Smoking Status Former Smoker   Smokeless Tobacco Never Used     Family History   Problem Relation Age of Onset    Colon cancer Mother     Colon polyps Mother     Uterine cancer Mother     Heart disease Father     Colon cancer Maternal Grandmother     Colon cancer Cousin        REVIEW OF SYSTEMS:  CONSTITUTIONAL: Denies any fever, chills, rigors, and weight loss  HEENT: No earache or tinnitus  Denies hearing loss or visual disturbances  CARDIOVASCULAR: No chest pain or palpitations  RESPIRATORY: Denies any cough, hemoptysis, shortness of breath or dyspnea on exertion  GASTROINTESTINAL: As noted in the History of Present Illness  GENITOURINARY: No problems with urination  Denies any hematuria or dysuria  NEUROLOGIC: No dizziness or vertigo, she does report some headaches which she feels may be more sinus related  MUSCULOSKELETAL: Denies any muscle or joint pain  She does report occasional back pain  SKIN: Denies skin rashes or itching  ENDOCRINE: Denies excessive thirst  Denies intolerance to heat or cold  PSYCHOSOCIAL: Denies depression or anxiety  Denies any recent memory loss  Objective   Blood pressure 118/70, pulse 72, temperature 98 6 °F (37 °C), temperature source Tympanic, resp  rate 16, height 5' 10" (1 778 m), weight 93 7 kg (206 lb 9 6 oz)  Body mass index is 29 64 kg/m²  PHYSICAL EXAM:   General Appearance: Alert, cooperative, no distress  HEENT: Normocephalic, atraumatic, anicteric  Neck: Supple, symmetrical, trachea midline  Lungs: Clear to auscultation bilaterally; no rales, rhonchi or wheezing; respirations unlabored   Heart: Regular rate and rhythm; no murmur, rub, or gallop  Abdomen: Soft, bowel sounds normal, non-tender, non-distended; no masses, there is no hepatosplenomegaly   No spider angiomas  Genitalia: Deferred   Rectal: Deferred   Extremities: No cyanosis, clubbing or edema   Skin: No jaundice, rashes, or lesions   Lymph nodes: No palpable cervical lymphadenopathy   Lab Results:   Lab on 07/07/2021   Component Date Value    Hepatitis C Ab 07/07/2021 Non-reactive     WBC 07/07/2021 8 76     RBC 07/07/2021 4 89     Hemoglobin 07/07/2021 14 6     Hematocrit 07/07/2021 45 6     MCV 07/07/2021 93     MCH 07/07/2021 29 9     MCHC 07/07/2021 32 0     RDW 07/07/2021 12 2     Platelets 06/90/9631 281     MPV 07/07/2021 11 0     IgA 07/07/2021 275     Gliadin IgA 07/07/2021 8     Gliadin IgG 07/07/2021 3     Tissue Transglut Ab IGG 07/07/2021 8*    TISSUE TRANSGLUTAMINASE * 07/07/2021 <2     Endomysial IgA 07/07/2021 Negative    Office Visit on 06/04/2021   Component Date Value    Case Report 06/04/2021                      Value:Gynecologic Cytology Report                       Case: [id number]                                  Authorizing Provider:  Jimi Whittaker CNM             Collected:           06/04/2021 1114              Ordering Location:     [facility]      Received:            06/04/2021 1114                                     [facility]                                                         First Screen:          RANJITH Valenzuela                                                       Specimen:    LIQUID-BASED PAP, SCREENING, Cervix, Endocervical                                          Primary Interpretation 06/04/2021 Negative for intraepithelial lesion or malignancy     Specimen Adequacy 06/04/2021 Satisfactory for evaluation  Absence of endocervical/transformation zone component   Additional Information 06/04/2021                      Value: This result contains rich text formatting which cannot be displayed here   HPV Other HR 06/04/2021 Negative     HPV16 06/04/2021 Negative     HPV18 06/04/2021 Negative    Appointment on 05/27/2021   Component Date Value    Hemoglobin A1C 05/27/2021 5 6     EAG 05/27/2021 114     Cholesterol 05/27/2021 184     Triglycerides 05/27/2021 140     HDL, Direct 05/27/2021 39*    LDL Calculated 05/27/2021 117*    Non-HDL-Chol (CHOL-HDL) 05/27/2021 145      Radiology Results:   No results found    [address] DO   08/16/21   Cc:

## 2021-07-07 NOTE — ASSESSMENT & PLAN NOTE
She does report some bloating  This may be related to her healthy diet  She may want to keep a diary of which she eats and how she feels to see if she can identify any food triggers  For completeness check celiac antibody panel

## 2021-07-07 NOTE — ASSESSMENT & PLAN NOTE
Given that she has noticed increased heartburn and indigestion recently, I did suggest starting famotidine 20 mg daily  She should take this 2 hours prior to bed  If she feels she needs more relief she can increase the dose to 40 mg nightly  Lifestyle modifications for gastroesophageal reflux disease were discussed and include limiting fried and fatty foods, mints, chocolates, carbonated and caffeinated beverages , and alcohol, etc   Avoid lying down for 2-3 hours after meals  If you have nighttime symptoms consider raising the head of the bed up on 4-6 inch blocks  Pillows typically are not useful  If you are overweight, weight loss will be helpful

## 2021-07-07 NOTE — ASSESSMENT & PLAN NOTE
Patient reports that her maternal grandmother  of colon cancer age 46  Her mom had an aggressive form of endometrial cancer in addition to colon cancer and  at age 79  Her family history at this point time does not meet criteria for Solo syndrome  However given her family history she may want to consider meeting with the cancer risk program   She will be scheduled for colonoscopy  She will receive a Colyte preparation in split dose with the 2nd dose completed 3 hours prior to arrival   Two bisacodyl tablets  If colitis not available, then we can try Suprep in split dose  I explained to the patient the procedure of colonoscopy as well as its potential risks which are approximately 3 in 1000 chance of bleeding, infection, and perforation  Perforation may require a surgeon to repair it  I also explained the small but significant chance of missing lesions with colonoscopy which has been reported to be about 5-10%

## 2021-07-08 ENCOUNTER — TELEPHONE (OUTPATIENT)
Dept: HEMATOLOGY ONCOLOGY | Facility: CLINIC | Age: 46
End: 2021-07-08

## 2021-07-08 LAB
ENDOMYSIUM IGA SER QL: NEGATIVE
GLIADIN PEPTIDE IGA SER-ACNC: 8 UNITS (ref 0–19)
GLIADIN PEPTIDE IGG SER-ACNC: 3 UNITS (ref 0–19)
IGA SERPL-MCNC: 275 MG/DL (ref 87–352)
TTG IGA SER-ACNC: <2 U/ML (ref 0–3)
TTG IGG SER-ACNC: 8 U/ML (ref 0–5)

## 2021-07-08 NOTE — TELEPHONE ENCOUNTER
Genetics New Patient Intake Form     Patient Details:     Mic Dhaliwal     1975     [id number]     Background Information:     Who is calling to schedule?                                            self   If not self, relationship to patient? Referring Provider Alvaro   Referral indication - Personal or Family history and what type of cancer? family uterine, colon   Is the referral marked STAT no   Was the patient diagnosed with cancer within the last 3 months? no   Does the patient have metastatic disease? no   Is the patient pending surgery? no   If the patient is pending surgery Route intake High Priority to Oncology Genetics Pool, marked STAT   If the patient is metastatic or newly diagnosed Schedule at next available urgent   If none available, schedule patient then email Stat cancer genetics   Have you had genetic testing that showed a positive genetic mutation? (If yes, schedule within 2 weeks or email STAT cancer genetics) No   Has your family member had genetic testing that resulted in a positive genetic mutation? (If yes in the last 6 months, schedule within 3 weeks )  (If yes but over 6 months, schedule as usual) No   Scheduling Information:   Preferred [place]:  [facility]   Are there any dates/time the patient cannot be seen? No   Did the patient schedule an appointment? Yes   If yes, list appointment date and provider name Kacy Mcconnell 11/23/21   If no, briefly state why    If patient did not schedule next available, why?     Miscellaneous    After completing the above information, please route to Oncology Genetics

## 2021-07-08 NOTE — RESULT ENCOUNTER NOTE
Please inform patient CBC is normal   Hepatitis-C antibody is negative  Celiac blood test are pending

## 2021-07-09 ENCOUNTER — TELEPHONE (OUTPATIENT)
Dept: GASTROENTEROLOGY | Facility: CLINIC | Age: 46
End: 2021-07-09

## 2021-07-09 NOTE — RESULT ENCOUNTER NOTE
Please inform patient that her celiac blood test overall are negative except for 1 of the test the tissue Check transglutaminase IgG antibody is weakly positive  Typically when I see this pattern, this is a false-positive test in patients with this pattern do not typically have celiac disease  If in fact her symptom of bloating continues despite dietary modifications, we could consider upper endoscopy with biopsy of the small bowel to rule out celiac disease but will hold off on that for now    Thank you

## 2021-07-09 NOTE — TELEPHONE ENCOUNTER
----- Message from Anuel South DO sent at 7/8/2021 10:10 AM EDT -----  Please inform patient CBC is normal   Hepatitis-C antibody is negative  Celiac blood test are pending

## 2021-07-09 NOTE — TELEPHONE ENCOUNTER
LVM for pt to call back to get results  ee below    Please inform patient CBC is normal   Hepatitis-C antibody is negative   Celiac blood test are pending

## 2021-07-12 ENCOUNTER — TELEPHONE (OUTPATIENT)
Dept: GASTROENTEROLOGY | Facility: CLINIC | Age: 46
End: 2021-07-12

## 2021-07-12 NOTE — TELEPHONE ENCOUNTER
----- Message from Won Foley DO sent at 7/8/2021 10:10 AM EDT -----  Please inform patient CBC is normal   Hepatitis-C antibody is negative  Celiac blood test are pending

## 2021-07-14 ENCOUNTER — PREP FOR PROCEDURE (OUTPATIENT)
Dept: GASTROENTEROLOGY | Facility: CLINIC | Age: 46
End: 2021-07-14

## 2021-07-14 DIAGNOSIS — R89.4 ABNORMAL CELIAC ANTIBODY PANEL: Primary | ICD-10-CM

## 2021-08-16 ENCOUNTER — TELEPHONE (OUTPATIENT)
Dept: SURGERY | Facility: HOSPITAL | Age: 46
End: 2021-08-16

## 2021-08-17 ENCOUNTER — HOSPITAL ENCOUNTER (OUTPATIENT)
Dept: GASTROENTEROLOGY | Facility: HOSPITAL | Age: 46
Setting detail: OUTPATIENT SURGERY
Discharge: HOME | End: 2021-08-17
Attending: INTERNAL MEDICINE | Admitting: INTERNAL MEDICINE
Payer: COMMERCIAL

## 2021-08-17 ENCOUNTER — ANESTHESIA EVENT (OUTPATIENT)
Dept: GASTROENTEROLOGY | Facility: HOSPITAL | Age: 46
End: 2021-08-17

## 2021-08-17 ENCOUNTER — ANESTHESIA (OUTPATIENT)
Dept: GASTROENTEROLOGY | Facility: HOSPITAL | Age: 46
End: 2021-08-17

## 2021-08-17 VITALS
RESPIRATION RATE: 16 BRPM | WEIGHT: 200 LBS | HEART RATE: 79 BPM | TEMPERATURE: 97.5 F | HEIGHT: 70 IN | BODY MASS INDEX: 28.63 KG/M2 | DIASTOLIC BLOOD PRESSURE: 65 MMHG | OXYGEN SATURATION: 95 % | SYSTOLIC BLOOD PRESSURE: 117 MMHG

## 2021-08-17 DIAGNOSIS — Z80.0 FAMILY HISTORY OF COLON CANCER: ICD-10-CM

## 2021-08-17 DIAGNOSIS — R89.4 ABNORMAL CELIAC ANTIBODY PANEL: ICD-10-CM

## 2021-08-17 DIAGNOSIS — K21.9 GASTROESOPHAGEAL REFLUX DISEASE, UNSPECIFIED WHETHER ESOPHAGITIS PRESENT: ICD-10-CM

## 2021-08-17 LAB
EXT PREGNANCY TEST URINE: NEGATIVE
EXT. CONTROL: NORMAL

## 2021-08-17 PROCEDURE — 81025 URINE PREGNANCY TEST: CPT | Performed by: INTERNAL MEDICINE

## 2021-08-17 PROCEDURE — 88305 TISSUE EXAM BY PATHOLOGIST: CPT | Performed by: PATHOLOGY

## 2021-08-17 PROCEDURE — 45385 COLONOSCOPY W/LESION REMOVAL: CPT | Performed by: INTERNAL MEDICINE

## 2021-08-17 PROCEDURE — 43239 EGD BIOPSY SINGLE/MULTIPLE: CPT | Performed by: INTERNAL MEDICINE

## 2021-08-17 PROCEDURE — 45380 COLONOSCOPY AND BIOPSY: CPT | Performed by: INTERNAL MEDICINE

## 2021-08-17 RX ORDER — LIDOCAINE HYDROCHLORIDE 20 MG/ML
15 SOLUTION OROPHARYNGEAL 4 TIMES DAILY PRN
Status: DISCONTINUED | OUTPATIENT
Start: 2021-08-17 | End: 2021-08-21 | Stop reason: HOSPADM

## 2021-08-17 RX ORDER — LIDOCAINE HYDROCHLORIDE 20 MG/ML
SOLUTION OROPHARYNGEAL CODE/TRAUMA/SEDATION MEDICATION
Status: COMPLETED | OUTPATIENT
Start: 2021-08-17 | End: 2021-08-17

## 2021-08-17 RX ORDER — SODIUM CHLORIDE, SODIUM LACTATE, POTASSIUM CHLORIDE, CALCIUM CHLORIDE 600; 310; 30; 20 MG/100ML; MG/100ML; MG/100ML; MG/100ML
125 INJECTION, SOLUTION INTRAVENOUS CONTINUOUS
Status: DISCONTINUED | OUTPATIENT
Start: 2021-08-17 | End: 2021-08-21 | Stop reason: HOSPADM

## 2021-08-17 RX ORDER — LIDOCAINE HYDROCHLORIDE 10 MG/ML
INJECTION, SOLUTION EPIDURAL; INFILTRATION; INTRACAUDAL; PERINEURAL AS NEEDED
Status: DISCONTINUED | OUTPATIENT
Start: 2021-08-17 | End: 2021-08-17

## 2021-08-17 RX ORDER — LIDOCAINE HYDROCHLORIDE 10 MG/ML
0.5 INJECTION, SOLUTION EPIDURAL; INFILTRATION; INTRACAUDAL; PERINEURAL ONCE AS NEEDED
Status: DISCONTINUED | OUTPATIENT
Start: 2021-08-17 | End: 2021-08-21 | Stop reason: HOSPADM

## 2021-08-17 RX ORDER — FAMOTIDINE 20 MG/1
40 TABLET, FILM COATED ORAL DAILY
Qty: 30 TABLET | Refills: 4 | Status: SHIPPED | OUTPATIENT
Start: 2021-08-17 | End: 2021-08-27

## 2021-08-17 RX ORDER — PROPOFOL 10 MG/ML
INJECTION, EMULSION INTRAVENOUS AS NEEDED
Status: DISCONTINUED | OUTPATIENT
Start: 2021-08-17 | End: 2021-08-17

## 2021-08-17 RX ADMIN — PROPOFOL 50 MG: 10 INJECTION, EMULSION INTRAVENOUS at 09:49

## 2021-08-17 RX ADMIN — PROPOFOL 100 MG: 10 INJECTION, EMULSION INTRAVENOUS at 10:00

## 2021-08-17 RX ADMIN — PROPOFOL 150 MG: 10 INJECTION, EMULSION INTRAVENOUS at 09:31

## 2021-08-17 RX ADMIN — PROPOFOL 50 MG: 10 INJECTION, EMULSION INTRAVENOUS at 10:04

## 2021-08-17 RX ADMIN — PROPOFOL 50 MG: 10 INJECTION, EMULSION INTRAVENOUS at 09:45

## 2021-08-17 RX ADMIN — PROPOFOL 100 MG: 10 INJECTION, EMULSION INTRAVENOUS at 09:35

## 2021-08-17 RX ADMIN — SODIUM CHLORIDE, SODIUM LACTATE, POTASSIUM CHLORIDE, AND CALCIUM CHLORIDE 125 ML/HR: .6; .31; .03; .02 INJECTION, SOLUTION INTRAVENOUS at 08:28

## 2021-08-17 RX ADMIN — PROPOFOL 50 MG: 10 INJECTION, EMULSION INTRAVENOUS at 10:07

## 2021-08-17 RX ADMIN — PROPOFOL 50 MG: 10 INJECTION, EMULSION INTRAVENOUS at 09:53

## 2021-08-17 RX ADMIN — PROPOFOL 50 MG: 10 INJECTION, EMULSION INTRAVENOUS at 09:40

## 2021-08-17 RX ADMIN — LIDOCAINE HYDROCHLORIDE 15 ML: 20 SOLUTION ORAL; TOPICAL at 09:31

## 2021-08-17 RX ADMIN — PROPOFOL 50 MG: 10 INJECTION, EMULSION INTRAVENOUS at 09:56

## 2021-08-17 RX ADMIN — LIDOCAINE HYDROCHLORIDE 50 MG: 10 INJECTION, SOLUTION EPIDURAL; INFILTRATION; INTRACAUDAL; PERINEURAL at 09:31

## 2021-08-17 NOTE — ANESTHESIA PREPROCEDURE EVALUATION
Procedure:  COLONOSCOPY  EGD    Relevant Problems   GI/HEPATIC   (+) GERD (gastroesophageal reflux disease)      NEURO/PSYCH   (+) Anxiety      Other   (+) Bloating        Physical Exam    Airway    Mallampati score: II  TM Distance: >3 FB  Neck ROM: full     Dental   No notable dental hx     Cardiovascular  Cardiovascular exam normal    Pulmonary  Pulmonary exam normal Breath sounds clear to auscultation,     Other Findings        Anesthesia Plan  ASA Score- 2     Anesthesia Type- IV sedation with anesthesia with ASA Monitors  Additional Monitors:   Airway Plan:           Plan Factors-Exercise tolerance (METS): >4 METS  Chart reviewed  EKG reviewed  Imaging results reviewed  Existing labs reviewed  Patient summary reviewed  Patient is not a current smoker  Patient instructed to abstain from smoking on day of procedure  Patient did not smoke on day of surgery  Obstructive sleep apnea risk education given perioperatively  Induction- intravenous  Postoperative Plan-     Informed Consent- Anesthetic plan and risks discussed with patient  I personally reviewed this patient with the CRNA  Discussed and agreed on the Anesthesia Plan with the CRNA             Lab Results   Component Value Date    WBC 8 76 07/07/2021    HGB 14 6 07/07/2021    HCT 45 6 07/07/2021    MCV 93 07/07/2021     07/07/2021     Lab Results   Component Value Date    GLUCOSE 86 06/17/2015    CALCIUM 9 2 04/02/2019     06/17/2015    K 3 8 04/02/2019    CO2 28 04/02/2019     04/02/2019    BUN 10 04/02/2019    CREATININE 0 64 04/02/2019     Discussed with pt the benefits/alternatives and risks or General Anesthesia including breathing tube remaining in place if not strong enough, PONV, damage to lips and teeth, sore throat, eye injury or blindness    I, Dr Nerissa Yin, the attending physician, have personally seen and evaluated the patient prior to anesthetic care   I have reviewed the pre-anesthetic record, and other medical records if appropriate to the anesthetic care  If a CRNA is involved in the case, I have reviewed the CRNA assessment, if present, and agree  The patient is in a suitable condition to proceed with my formulated anesthetic plan

## 2021-08-17 NOTE — ANESTHESIA POSTPROCEDURE EVALUATION
Post-Op Assessment Note    CV Status:  Stable  Pain Score: 0    Pain management: adequate     Mental Status:  Awake   Hydration Status:  Euvolemic   PONV Controlled:  Controlled   Airway Patency:  Patent      Post Op Vitals Reviewed: Yes      Staff: CRNA         No complications documented      BP      Temp      Pulse     Resp      SpO2

## 2021-08-17 NOTE — H&P
History and Physical - [facility]  Agnieszka Flanagan 55 y o  female MRN: [id number]                  HPI: Agnieszka Flanagan is a 55y o  year old female who presents for EGD and colonoscopy  Patient's EGD is being performed for dyspeptic symptoms and reflux  She also had a positive celiac antibody tTG IgG of 9  Her bowel patterns very regular denies any diarrhea constipation bleeding or black stools  Her reflux has been improved with famotidine 20 mg daily but she continues to have some indigestion  She was asking if the dose could be increased  She denies any dysphagia nausea vomiting or early satiety  Mother had a rare form of uterine cancer  She also had colon cancer  Maternal grandmother had colon cancer maternal cousin had colon polyps  He does not smoke tobacco and does not drink alcohol  REVIEW OF SYSTEMS: Per the HPI, and otherwise unremarkable      Historical Information   Past Medical History:   Diagnosis Date    Abnormal Pap smear of cervix     Allergic rhinitis     Resolved 7/14/2017     Anxiety     Generalized anxiety disorder     Resolved 7/14/2017      Past Surgical History:   Procedure Laterality Date    COLPOSCOPY      NO PAST SURGERIES       Social History   Social History     Substance and Sexual Activity   Alcohol Use Yes    Comment: occasional      Social History     Substance and Sexual Activity   Drug Use Never     Social History     Tobacco Use   Smoking Status Former Smoker   Smokeless Tobacco Never Used     Family History   Problem Relation Age of Onset    Colon cancer Mother     Colon polyps Mother     Uterine cancer Mother     Heart disease Father     Colon cancer Maternal Grandmother     Colon cancer Cousin        Meds/Allergies       Current Outpatient Medications:     famotidine (PEPCID) 20 mg tablet    meclizine (ANTIVERT) 12 5 MG tablet    polyethylene glycol (COLYTE) 4000 mL solution    Current Facility-Administered Medications:     lactated ringers infusion, 125 mL/hr, Intravenous, Continuous, 125 mL/hr at 08/17/21 0828    lidocaine (PF) (XYLOCAINE-MPF) 1 % injection 0 5 mL, 0 5 mL, Infiltration, Once PRN    Lidocaine Viscous HCl (XYLOCAINE) 2 % mucosal solution 15 mL, 15 mL, Swish & Spit, 4x Daily PRN    No Known Allergies    Objective     /75   Pulse 75   Temp 98 8 °F (37 1 °C) (Temporal)   Resp 16   Ht 5' 10" (1 778 m)   Wt 90 7 kg (200 lb)   LMP 08/17/2021 (Exact Date)   SpO2 96%   BMI 28 70 kg/m²       PHYSICAL EXAM    Gen: NAD  Head: NCAT  CV: RRR  CHEST: Clear  ABD: soft, NT/ND  EXT: no edema      ASSESSMENT/PLAN:  This is a 55y o  year old female here for EGD and colonoscopy, and she is stable and optimized for her procedure

## 2021-08-21 NOTE — RESULT ENCOUNTER NOTE
I left a message on patient's voicemail that biopsies of the duodenum were negative for celiac disease  Biopsies stomach revealed some chronic inactive inflammation negative for H pylori  Biopsies distal esophagus did reveal some chronic inflammation but otherwise negative for any Bajwa's esophagus  Colon polyps were all benign 1 was pre cancerous and was completely  Please recall for repeat colonoscopy in 5 years  Follow-up in my office in 4-5 months    Thank you

## 2021-08-27 ENCOUNTER — OFFICE VISIT (OUTPATIENT)
Dept: FAMILY MEDICINE CLINIC | Facility: CLINIC | Age: 46
End: 2021-08-27
Payer: COMMERCIAL

## 2021-08-27 VITALS
HEIGHT: 70 IN | DIASTOLIC BLOOD PRESSURE: 80 MMHG | SYSTOLIC BLOOD PRESSURE: 122 MMHG | WEIGHT: 205.2 LBS | TEMPERATURE: 97.1 F | BODY MASS INDEX: 29.38 KG/M2

## 2021-08-27 DIAGNOSIS — Z00.00 ANNUAL PHYSICAL EXAM: Primary | ICD-10-CM

## 2021-08-27 DIAGNOSIS — K21.9 GASTROESOPHAGEAL REFLUX DISEASE, UNSPECIFIED WHETHER ESOPHAGITIS PRESENT: ICD-10-CM

## 2021-08-27 DIAGNOSIS — Z23 ENCOUNTER FOR IMMUNIZATION: ICD-10-CM

## 2021-08-27 PROCEDURE — 99396 PREV VISIT EST AGE 40-64: CPT | Performed by: FAMILY MEDICINE

## 2021-08-27 PROCEDURE — 90686 IIV4 VACC NO PRSV 0.5 ML IM: CPT | Performed by: FAMILY MEDICINE

## 2021-08-27 PROCEDURE — 90471 IMMUNIZATION ADMIN: CPT | Performed by: FAMILY MEDICINE

## 2021-08-27 RX ORDER — FAMOTIDINE 40 MG/1
40 TABLET, FILM COATED ORAL DAILY
Qty: 90 TABLET | Refills: 3 | Status: SHIPPED | OUTPATIENT
Start: 2021-08-27

## 2021-08-27 NOTE — PROGRESS NOTES
[facility]    NAME: Sosa Harris  AGE: 55 y o  SEX: female  : 1975     DATE: 2021     Assessment and Plan:     Flu shot given today  Continue same medications  Continue to watch diet and continue to be as active as she can  We will see her back in 6 months or p r n     Problem List Items Addressed This Visit        Digestive    GERD (gastroesophageal reflux disease)    Relevant Medications    famotidine (PEPCID) 40 MG tablet      Other Visit Diagnoses     Annual physical exam    -  Primary    Encounter for immunization        Relevant Orders    influenza vaccine, quadrivalent, 0 5 mL, preservative-free, for adult and pediatric patients 6 mos+ (AFLURIA, FLUARIX, FLULAVAL, FLUZONE)    BMI 29 0-29 9,adult              Immunizations and preventive care screenings were discussed with patient today  Appropriate education was printed on patient's after visit summary  Counseling:  Dental Health: discussed importance of regular tooth brushing, flossing, and dental visits  · Exercise: the importance of regular exercise/physical activity was discussed  Recommend exercise 3-5 times per week for at least 30 minutes  Return in about 6 months (around 2022) for Recheck  Chief Complaint:     Chief Complaint   Patient presents with    Annual Exam     doing good      History of Present Illness:     Adult Annual Physical   Patient here for a comprehensive physical exam  The patient reports no problems  Diet and Physical Activity  · Diet/Nutrition: well balanced diet  · Exercise: walking  Depression Screening  PHQ-9 Depression Screening    PHQ-9:   Frequency of the following problems over the past two weeks:      Little interest or pleasure in doing things: 0 - not at all  Feeling down, depressed, or hopeless: 0 - not at all  PHQ-2 Score: 0       General Health  · Sleep: sleeps well     · Hearing: normal - bilateral   · Vision: no vision problems  · Dental: regular dental visits  /GYN Health  · Patient is: premenopausal  · Last menstrual period: this month  · Contraceptive method: none  Review of Systems:     Review of Systems   Constitutional: Negative  Respiratory: Negative  Cardiovascular: Negative  Gastrointestinal: Negative  Genitourinary: Negative  Past Medical History:     Past Medical History:   Diagnosis Date    Abnormal Pap smear of cervix     Allergic rhinitis     Resolved 7/14/2017     Anxiety     Generalized anxiety disorder     Resolved 7/14/2017       Past Surgical History:     Past Surgical History:   Procedure Laterality Date    COLPOSCOPY      NO PAST SURGERIES        Social History:     Social History     Socioeconomic History    Marital status: /Civil Union     Spouse name: None    Number of children: None    Years of education: None    Highest education level: None   Occupational History    None   Tobacco Use    Smoking status: Former Smoker    Smokeless tobacco: Never Used   Vaping Use    Vaping Use: Never used   Substance and Sexual Activity    Alcohol use: Yes     Comment: occasional     Drug use: Never    Sexual activity: Yes     Partners: Male     Birth control/protection: Rhythm   Other Topics Concern    None   Social History Narrative    None     Social Determinants of Health     Financial Resource Strain:     Difficulty of Paying Living Expenses:    Food Insecurity:     Worried About Running Out of Food in the Last Year:     Ran Out of Food in the Last Year:    Transportation Needs:     Lack of Transportation (Medical):      Lack of Transportation (Non-Medical):    Physical Activity:     Days of Exercise per Week:     Minutes of Exercise per Session:    Stress:     Feeling of Stress :    Social Connections:     Frequency of Communication with Friends and Family:     Frequency of Social Gatherings with Friends and Family:  Attends Sikh Services:     Active Member of Clubs or Organizations:     Attends Club or Organization Meetings:     Marital Status:    Intimate Partner Violence:     Fear of Current or Ex-Partner:     Emotionally Abused:     Physically Abused:     Sexually Abused:       Family History:     Family History   Problem Relation Age of Onset    Colon cancer Mother     Colon polyps Mother     Uterine cancer Mother     Heart disease Father     Colon cancer Maternal Grandmother     Colon cancer Cousin       Current Medications:     Current Outpatient Medications   Medication Sig Dispense Refill    famotidine (PEPCID) 40 MG tablet Take 1 tablet (40 mg total) by mouth daily Take 1 tablet 2 hours prior to bed  90 tablet 3    meclizine (ANTIVERT) 12 5 MG tablet Take 1 tablet (12 5 mg total) by mouth every 8 (eight) hours as needed for dizziness 30 tablet 0     No current facility-administered medications for this visit  Allergies:     No Known Allergies   Physical Exam:     /80   Temp (!) 97 1 °F (36 2 °C)   Ht 5' 10" (1 778 m)   Wt 93 1 kg (205 lb 3 2 oz)   LMP 08/17/2021 (Exact Date)   BMI 29 44 kg/m²     Physical Exam  Vitals reviewed  Constitutional:       General: She is not in acute distress  Appearance: Normal appearance  She is well-developed  She is not ill-appearing or diaphoretic  HENT:      Head: Normocephalic and atraumatic  Eyes:      Conjunctiva/sclera: Conjunctivae normal    Cardiovascular:      Rate and Rhythm: Normal rate and regular rhythm  Heart sounds: Normal heart sounds  No murmur heard  No friction rub  No gallop  Pulmonary:      Effort: Pulmonary effort is normal  No respiratory distress  Breath sounds: Normal breath sounds  No wheezing or rales  Musculoskeletal:      Right lower leg: No edema  Left lower leg: No edema  Neurological:      General: No focal deficit present        Mental Status: She is alert and oriented to person, place, and time  Psychiatric:         Mood and Affect: Mood normal          Behavior: Behavior normal          Thought Content: Thought content normal          Judgment: Judgment normal           [facility]  BMI Counseling: Body mass index is 29 44 kg/m²  The BMI is above normal  Nutrition recommendations include reducing portion sizes, decreasing overall calorie intake, 3-5 servings of fruits/vegetables daily, reducing fast food intake, consuming healthier snacks, decreasing soda and/or juice intake, moderation in carbohydrate intake, increasing intake of lean protein, reducing intake of saturated fat and trans fat and reducing intake of cholesterol  Exercise recommendations include exercising 3-5 times per week

## 2021-08-27 NOTE — PATIENT INSTRUCTIONS
Wellness Visit for Adults   AMBULATORY CARE:   A wellness visit  is when you see your healthcare provider to get screened for health problems  Your healthcare provider will also give you advice on how to stay healthy  Write down your questions so you remember to ask them  Ask your healthcare provider how often you should have a wellness visit  What happens at a wellness visit:  Your healthcare provider will ask about your health, and your family history of health problems  This includes high blood pressure, heart disease, and cancer  He or she will ask if you have symptoms that concern you, if you smoke, and about your mood  You may also be asked about your intake of medicines, supplements, food, and alcohol  Any of the following may be done:  · Your weight  will be checked  Your height may also be checked so your body mass index (BMI) can be calculated  Your BMI shows if you are at a healthy weight  · Your blood pressure  and heart rate will be checked  Your temperature may also be checked  · Blood and urine tests  may be done  Blood tests may be done to check your cholesterol levels  Abnormal cholesterol levels increase your risk for heart disease and stroke  You may also need a blood or urine test to check for diabetes if you are at increased risk  Urine tests may be done to look for signs of an infection or kidney disease  · A physical exam  includes checking your heartbeat and lungs with a stethoscope  Your healthcare provider may also check your skin to look for sun damage  · Screening tests  may be recommended  A screening test is done to check for diseases that may not cause symptoms  The screening tests you may need depend on your age, gender, family history, and lifestyle habits  For example, colorectal screening may be recommended if you are 48years old or older  Screening tests you need if you are a woman:   · A Pap smear  is used to screen for cervical cancer   Pap smears are usually done every 3 to 5 years depending on your age  You may need them more often if you have had abnormal Pap smear test results in the past  Ask your healthcare provider how often you should have a Pap smear  · A mammogram  is an x-ray of your breasts to screen for breast cancer  Experts recommend mammograms every 2 years starting at age 48 years  You may need a mammogram at age 52 years or younger if you have an increased risk for breast cancer  Talk to your healthcare provider about when you should start having mammograms and how often you need them  Vaccines you may need:   · Get an influenza vaccine  every year  The influenza vaccine protects you from the flu  Several types of viruses cause the flu  The viruses change over time, so new vaccines are made each year  · Get a tetanus-diphtheria (Td) booster vaccine  every 10 years  This vaccine protects you against tetanus and diphtheria  Tetanus is a severe infection that may cause painful muscle spasms and lockjaw  Diphtheria is a severe bacterial infection that causes a thick covering in the back of your mouth and throat  · Get a human papillomavirus (HPV) vaccine  if you are female and aged 23 to 32 or male 23 to 24 and never received it  This vaccine protects you from HPV infection  HPV is the most common infection spread by sexual contact  HPV may also cause vaginal, penile, and anal cancers  · Get a pneumococcal vaccine  if you are aged 72 years or older  The pneumococcal vaccine is an injection given to protect you from pneumococcal disease  Pneumococcal disease is an infection caused by pneumococcal bacteria  The infection may cause pneumonia, meningitis, or an ear infection  · Get a shingles vaccine  if you are 60 or older, even if you have had shingles before  The shingles vaccine is an injection to protect you from the varicella-zoster virus  This is the same virus that causes chickenpox   Shingles is a painful rash that develops in people who had chickenpox or have been exposed to the virus  How to eat healthy:  My Plate is a model for planning healthy meals  It shows the types and amounts of foods that should go on your plate  Fruits and vegetables make up about half of your plate, and grains and protein make up the other half  A serving of dairy is included on the side of your plate  The amount of calories and serving sizes you need depends on your age, gender, weight, and height  Examples of healthy foods are listed below:  · Eat a variety of vegetables  such as dark green, red, and orange vegetables  You can also include canned vegetables low in sodium (salt) and frozen vegetables without added butter or sauces  · Eat a variety of fresh fruits , canned fruit in 100% juice, frozen fruit, and dried fruit  · Include whole grains  At least half of the grains you eat should be whole grains  Examples include whole-wheat bread, wheat pasta, brown rice, and whole-grain cereals such as oatmeal     · Eat a variety of protein foods such as seafood (fish and shellfish), lean meat, and poultry without skin (turkey and chicken)  Examples of lean meats include pork leg, shoulder, or tenderloin, and beef round, sirloin, tenderloin, and extra lean ground beef  Other protein foods include eggs and egg substitutes, beans, peas, soy products, nuts, and seeds  · Choose low-fat dairy products such as skim or 1% milk or low-fat yogurt, cheese, and cottage cheese  · Limit unhealthy fats  such as butter, hard margarine, and shortening  Exercise:  Exercise at least 30 minutes per day on most days of the week  Some examples of exercise include walking, biking, dancing, and swimming  You can also fit in more physical activity by taking the stairs instead of the elevator or parking farther away from stores  Include muscle strengthening activities 2 days each week  Regular exercise provides many health benefits   It helps you manage your weight, and decreases your risk for type 2 diabetes, heart disease, stroke, and high blood pressure  Exercise can also help improve your mood  Ask your healthcare provider about the best exercise plan for you  General health and safety guidelines:   · Do not smoke  Nicotine and other chemicals in cigarettes and cigars can cause lung damage  Ask your healthcare provider for information if you currently smoke and need help to quit  E-cigarettes or smokeless tobacco still contain nicotine  Talk to your healthcare provider before you use these products  · Limit alcohol  A drink of alcohol is 12 ounces of beer, 5 ounces of wine, or 1½ ounces of liquor  · Lose weight, if needed  Being overweight increases your risk of certain health conditions  These include heart disease, high blood pressure, type 2 diabetes, and certain types of cancer  · Protect your skin  Do not sunbathe or use tanning beds  Use sunscreen with a SPF 15 or higher  Apply sunscreen at least 15 minutes before you go outside  Reapply sunscreen every 2 hours  Wear protective clothing, hats, and sunglasses when you are outside  · Drive safely  Always wear your seatbelt  Make sure everyone in your car wears a seatbelt  A seatbelt can save your life if you are in an accident  Do not use your cell phone when you are driving  This could distract you and cause an accident  Pull over if you need to make a call or send a text message  · Practice safe sex  Use latex condoms if are sexually active and have more than one partner  Your healthcare provider may recommend screening tests for sexually transmitted infections (STIs)  · Wear helmets, lifejackets, and protective gear  Always wear a helmet when you ride a bike or motorcycle, go skiing, or play sports that could cause a head injury  Wear protective equipment when you play sports  Wear a lifejacket when you are on a boat or doing water sports      © Copyright [employer] 2021 Information is for End User's use only and may not be sold, redistributed or otherwise used for commercial purposes  All illustrations and images included in CareNotes® are the copyrighted property of [employer]  or [employer] Morrow  The above information is an  only  It is not intended as medical advice for individual conditions or treatments  Talk to your doctor, nurse or pharmacist before following any medical regimen to see if it is safe and effective for you  Heart Healthy Diet   AMBULATORY CARE:   A heart healthy diet  is an eating plan low in unhealthy fats and sodium (salt)  The plan is high in healthy fats and fiber  A heart healthy diet helps improve your cholesterol levels and lowers your risk for heart disease and stroke  A dietitian will teach you how to read and understand food labels  Heart healthy diet guidelines to follow:   · Choose foods that contain healthy fats  ? Unsaturated fats  include monounsaturated and polyunsaturated fats  Unsaturated fat is found in foods such as soybean, canola, olive, corn, and safflower oils  It is also found in soft tub margarine that is made with liquid vegetable oil  ? Omega-3 fat  is found in certain fish, such as salmon, tuna, and trout, and in walnuts and flaxseed  Eat fish high in omega-3 fats at least 2 times a week  · Get 20 to 30 grams of fiber each day  Fruits, vegetables, whole-grain foods, and legumes (cooked beans) are good sources of fiber  · Limit or do not have unhealthy fats  ? Cholesterol  is found in animal foods, such as eggs and lobster, and in dairy products made from whole milk  Limit cholesterol to less than 200 mg each day  ? Saturated fat  is found in meats, such as dawson and hamburger  It is also found in chicken or turkey skin, whole milk, and butter  Limit saturated fat to less than 7% of your total daily calories  ? Trans fat  is found in packaged foods, such as potato chips and cookies   It is also in hard margarine, some fried foods, and shortening  Do not eat foods that contain trans fats  · Limit sodium as directed  You may be told to limit sodium to 2,000 to 2,300 mg each day  Choose low-sodium or no-salt-added foods  Add little or no salt to food you prepare  Use herbs and spices in place of salt  Include the following in your heart healthy plan:  Ask your dietitian or healthcare provider how many servings to have from each of the following food groups:  · Grains:      ? Whole-wheat breads, cereals, and pastas, and brown rice    ? Low-fat, low-sodium crackers and chips    · Vegetables:      ? Broccoli, green beans, green peas, and spinach    ? Collards, kale, and lima beans    ? Carrots, sweet potatoes, tomatoes, and peppers    ? Canned vegetables with no salt added    · Fruits:      ? Bananas, peaches, pears, and pineapple    ? Grapes, raisins, and dates    ? Oranges, tangerines, grapefruit, orange juice, and grapefruit juice    ? Apricots, mangoes, melons, and papaya    ? Raspberries and strawberries    ? Canned fruit with no added sugar    · Low-fat dairy:      ? Nonfat (skim) milk, 1% milk, and low-fat almond, cashew, or soy milks fortified with calcium    ? Low-fat cheese, regular or frozen yogurt, and cottage cheese    · Meats and proteins:      ? Lean cuts of beef and pork (loin, leg, round), skinless chicken and turkey    ? Legumes, soy products, egg whites, or nuts    Limit or do not include the following in your heart healthy plan:   · Unhealthy fats and oils:      ? Whole or 2% milk, cream cheese, sour cream, or cheese    ? High-fat cuts of beef (T-bone steaks, ribs), chicken or turkey with skin, and organ meats such as liver    ?  Butter, stick margarine, shortening, and cooking oils such as coconut or palm oil    · Foods and liquids high in sodium:      ? Packaged foods, such as frozen dinners, cookies, macaroni and cheese, and cereals with more than 300 mg of sodium per serving    ? Vegetables with added sodium, such as instant potatoes, vegetables with added sauces, or regular canned vegetables    ? Cured or smoked meats, such as hot dogs, dawson, and sausage    ? High-sodium ketchup, barbecue sauce, salad dressing, pickles, olives, soy sauce, or miso    · Foods and liquids high in sugar:      ? Candy, cake, cookies, pies, or doughnuts    ? Soft drinks (soda), sports drinks, or sweetened tea    ? Canned or dry mixes for cakes, soups, sauces, or gravies    Other healthy heart guidelines:   · Do not smoke  Nicotine and other chemicals in cigarettes and cigars can cause lung and heart damage  Ask your healthcare provider for information if you currently smoke and need help to quit  E-cigarettes or smokeless tobacco still contain nicotine  Talk to your healthcare provider before you use these products  · Limit or do not drink alcohol as directed  Alcohol can damage your heart and raise your blood pressure  Your healthcare provider may give you specific daily and weekly limits  The general recommended limit is 1 drink a day for women 21 or older and for men 72 or older  Do not have more than 3 drinks in a day or 7 in a week  The recommended limit is 2 drinks a day for men 24to 59years of age  Do not have more than 4 drinks in a day or 14 in a week  A drink of alcohol is 12 ounces of beer, 5 ounces of wine, or 1½ ounces of liquor  · Exercise regularly  Exercise can help you maintain a healthy weight and improve your blood pressure and cholesterol levels  Regular exercise can also decrease your risk for heart problems  Ask your healthcare provider about the best exercise plan for you  Do not start an exercise program without asking your healthcare provider  Follow up with your doctor or cardiologist as directed:  Write down your questions so you remember to ask them during your visits    © Copyright [employer] 2021 Information is for End User's use only and may not be sold, redistributed or otherwise used for commercial purposes  All illustrations and images included in CareNotes® are the copyrighted property of [employer]  or [employer] Morrow  The above information is an  only  It is not intended as medical advice for individual conditions or treatments  Talk to your doctor, nurse or pharmacist before following any medical regimen to see if it is safe and effective for you

## 2021-10-04 ENCOUNTER — TELEPHONE (OUTPATIENT)
Dept: FAMILY MEDICINE CLINIC | Facility: CLINIC | Age: 46
End: 2021-10-04

## 2021-11-02 ENCOUNTER — TELEPHONE (OUTPATIENT)
Dept: FAMILY MEDICINE CLINIC | Facility: CLINIC | Age: 46
End: 2021-11-02

## 2021-11-15 ENCOUNTER — TELEPHONE (OUTPATIENT)
Dept: SURGICAL ONCOLOGY | Facility: CLINIC | Age: 46
End: 2021-11-15

## 2021-11-23 ENCOUNTER — CLINICAL SUPPORT (OUTPATIENT)
Dept: GENETICS | Facility: CLINIC | Age: 46
End: 2021-11-23

## 2021-11-23 DIAGNOSIS — Z80.49 FAMILY HISTORY OF UTERINE CANCER: ICD-10-CM

## 2021-11-23 DIAGNOSIS — Z83.71 FAMILY HISTORY OF COLONIC POLYPS: ICD-10-CM

## 2021-11-23 DIAGNOSIS — Z80.0 FAMILY HISTORY OF COLON CANCER: Primary | ICD-10-CM

## 2021-11-23 DIAGNOSIS — Z86.010 PERSONAL HISTORY OF COLONIC POLYPS: ICD-10-CM

## 2021-11-23 PROCEDURE — NC001 PR NO CHARGE: Performed by: GENETIC COUNSELOR, MS

## 2021-12-09 ENCOUNTER — TELEPHONE (OUTPATIENT)
Dept: FAMILY MEDICINE CLINIC | Facility: CLINIC | Age: 46
End: 2021-12-09

## 2021-12-09 DIAGNOSIS — R30.0 DYSURIA: Primary | ICD-10-CM

## 2021-12-09 RX ORDER — SULFAMETHOXAZOLE AND TRIMETHOPRIM 800; 160 MG/1; MG/1
1 TABLET ORAL 2 TIMES DAILY
Qty: 10 TABLET | Refills: 0 | Status: SHIPPED | OUTPATIENT
Start: 2021-12-09 | End: 2021-12-14

## 2022-01-04 NOTE — PROGRESS NOTES
[facility] Gastroenterology  Gastroenterology Outpatient Follow up  Patient Chel Willis   Age 55 y o  Gender female   MRN: [id number]  [facility] [id number]     ASSESSMENT AND PLAN:   Problem List Items Addressed This Visit        Digestive    GERD (gastroesophageal reflux disease) - Primary     Laina Medley does have GERD  This seems to be well controlled with famotidine 40 mg in the evening  She has been taking this about 3 nights a week  Continue with lifestyle modifications as this seemed to improve her symptoms  Lifestyle modifications for gastroesophageal reflux disease were discussed and include limiting fried and fatty foods, mints, chocolates, carbonated and caffeinated beverages , and alcohol, etc   Avoid lying down for 2-3 hours after meals  If you have nighttime symptoms consider raising the head of the bed up on 4-6 inch blocks  Pillows typically are not useful  If you are overweight, weight loss will be helpful  Other    Family history of colon cancer     Trace his mom did have both uterine and colon cancer in her maternal grandmother had colon cancer and maternal cousin had multiple colon polyps in his 45s  Her brother has also had polyps  While this is not consistent with typical presentation of hereditary cancer syndrome, there are multiple relatives with uterine and colon cancer  She did meet with the genetic counselors  We did discuss that she should consider genetic testing as suggested by the genetic counselors  She is giving this careful consideration at this time  Family history of uterine cancer     See comments under family history colon cancer  History of colon polyps     Patient was noted to only have 1 tubular adenoma in the cecum  Remainder of the polyps were hyperplastic including multiple small rectal polyps  Based upon this she will be due for repeat colonoscopy in 5 years    Unless she does undergo genetic testing and she has a specific gene mutation that would suggest sooner colonoscopy then we would follow guidelines based upon genetic test results  _____________________________________________________________    HPI:   Hudson Tinsley is a delightful 49-year-old woman who I am seeing in the office in follow-up due to personal history colon polyp, and gastroesophageal reflux  She also has a family history of uterine cancer, colon cancer in her mom and colon cancer maternal grandmother and a maternal cousin had colon polyps in his 45s  Since her last visit she has been using famotidine 40 mg in the evening about 3 times per week  She also has the head of her bed raised up and she has cut back on drinking caffeinated tea  She rarely if ever has breakthrough heartburn  There has been no dysphagia nausea vomiting or early satiety  She denies any diarrhea, constipation, bleeding or black stools  She did see the genetic counselors  She was offered genetic testing but is giving this some careful consideration  Records reviewed for 15 minutes prior to office visit   08/17/2021 EGD   Normal duodenum  Biopsies negative for celiac disease  Mild patchy antral erythema  Mild chronic inactive gastritis  Negative for H pylori  Two small 2-3 mm polyps at the Z-line/ GE junction  Benign polypoid squamocolumnar junction mucosa with regenerative in chronic inflammatory changes  Negative for Bajwa's esophagus  Fewer than 3 eosinophils per high-power field  Otherwise normal squamocolumnar junction and esophagus     08/17/2021 colonoscopy   Normal terminal ileum  6 mm sessile polyp cecum   Tubular adenoma  3 mm sessile polyp distal transverse colon  Biopsy unremarkable  5 mm sessile polyp at 50 cm   Hyperplastic polyp  Several hyperplastic appearing polyps in the rectum 6 removed    Hyperplastic polyps  Mild to moderate diverticulosis sigmoid colon   Mild diverticulosis descending colon   Small external hemorrhoids    No Known Allergies  Current Outpatient Medications   Medication Sig Dispense Refill    famotidine (PEPCID) 40 MG tablet Take 1 tablet (40 mg total) by mouth daily Take 1 tablet 2 hours prior to bed  90 tablet 3    meclizine (ANTIVERT) 12 5 MG tablet Take 1 tablet (12 5 mg total) by mouth every 8 (eight) hours as needed for dizziness 30 tablet 0     No current facility-administered medications for this visit  MEDICAL HISTORY:  Past Medical History:   Diagnosis Date    Abnormal Pap smear of cervix     Allergic rhinitis     Resolved 7/14/2017     Anxiety     Generalized anxiety disorder     Resolved 7/14/2017      Past Surgical History:   Procedure Laterality Date    COLPOSCOPY      NO PAST SURGERIES       Social History     Substance and Sexual Activity   Alcohol Use Yes    Comment: occasional      Social History     Substance and Sexual Activity   Drug Use Never     Social History     Tobacco Use   Smoking Status Former Smoker   Smokeless Tobacco Never Used     Family History   Problem Relation Age of Onset    Colon cancer Mother     Colon polyps Mother     Uterine cancer Mother     Heart disease Father     Colon cancer Maternal Grandmother     Colon cancer Cousin      Objective   Blood pressure 122/80, pulse 65, temperature 98 4 °F (36 9 °C), temperature source Tympanic, resp  rate 16, height 5' 10" (1 778 m), weight 92 2 kg (203 lb 3 2 oz)  Body mass index is 29 16 kg/m²  PHYSICAL EXAM:   General Appearance: Alert, cooperative, no distress  HEENT: Normocephalic, atraumatic, anicteric  Neck: Supple, symmetrical, trachea midline  Lungs: Clear to auscultation bilaterally; no rales, rhonchi or wheezing; respirations unlabored   Heart: Regular rate and rhythm; no murmur, rub, or gallop  Abdomen: Soft, bowel sounds normal, non-tender, non-distended; no masses, there is no hepatosplenomegaly   No spider angiomas  Genitalia: Deferred   Rectal: Deferred   Extremities: No cyanosis, clubbing or edema   Skin: No jaundice, rashes, or lesions   Lymph nodes: No palpable cervical lymphadenopathy   Lab Results:   Lab Requisition on 01/03/2022   Component Date Value    SARS-CoV-2 01/03/2022 Negative      Radiology Results:   No results found    [facility]   01/05/22   Cc:

## 2022-01-05 ENCOUNTER — OFFICE VISIT (OUTPATIENT)
Dept: GASTROENTEROLOGY | Facility: CLINIC | Age: 47
End: 2022-01-05
Payer: COMMERCIAL

## 2022-01-05 VITALS
DIASTOLIC BLOOD PRESSURE: 80 MMHG | TEMPERATURE: 98.4 F | BODY MASS INDEX: 29.09 KG/M2 | HEIGHT: 70 IN | SYSTOLIC BLOOD PRESSURE: 122 MMHG | WEIGHT: 203.2 LBS | HEART RATE: 65 BPM | RESPIRATION RATE: 16 BRPM

## 2022-01-05 DIAGNOSIS — Z80.49 FAMILY HISTORY OF UTERINE CANCER: ICD-10-CM

## 2022-01-05 DIAGNOSIS — Z86.010 HISTORY OF COLON POLYPS: ICD-10-CM

## 2022-01-05 DIAGNOSIS — Z80.0 FAMILY HISTORY OF COLON CANCER: ICD-10-CM

## 2022-01-05 DIAGNOSIS — K21.9 GASTROESOPHAGEAL REFLUX DISEASE, UNSPECIFIED WHETHER ESOPHAGITIS PRESENT: Primary | ICD-10-CM

## 2022-01-05 PROBLEM — Z86.0100 HISTORY OF COLON POLYPS: Status: ACTIVE | Noted: 2022-01-05

## 2022-01-05 PROCEDURE — 99214 OFFICE O/P EST MOD 30 MIN: CPT | Performed by: INTERNAL MEDICINE

## 2022-01-05 NOTE — PATIENT INSTRUCTIONS
GERD (gastroesophageal reflux disease)  Vanessa Smith does have GERD  This seems to be well controlled with famotidine 40 mg in the evening  She has been taking this about 3 nights a week  Continue with lifestyle modifications as this seemed to improve her symptoms  Lifestyle modifications for gastroesophageal reflux disease were discussed and include limiting fried and fatty foods, mints, chocolates, carbonated and caffeinated beverages , and alcohol, etc   Avoid lying down for 2-3 hours after meals  If you have nighttime symptoms consider raising the head of the bed up on 4-6 inch blocks  Pillows typically are not useful  If you are overweight, weight loss will be helpful  Family history of colon cancer  Trace his mom did have both uterine and colon cancer in her maternal grandmother had colon cancer and maternal cousin had multiple colon polyps in his 45s  Her brother has also had polyps  While this is not consistent with typical presentation of hereditary cancer syndrome, there are multiple relatives with uterine and colon cancer  She did meet with the genetic counselors  We did discuss that she should consider genetic testing as suggested by the genetic counselors  She is giving this careful consideration at this time  Family history of uterine cancer  See comments under family history colon cancer  History of colon polyps  Patient was noted to only have 1 tubular adenoma in the cecum  Remainder of the polyps were hyperplastic including multiple small rectal polyps  Based upon this she will be due for repeat colonoscopy in 5 years  Unless she does undergo genetic testing and she has a specific gene mutation that would suggest sooner colonoscopy then we would follow guidelines based upon genetic test results

## 2022-01-05 NOTE — LETTER
January 5, 2022     DO [address]    Patient: Og Carrera   YOB: 1975   Date of Visit: 1/5/2022       Dear Dr Beatrice Parr: Thank you for referring Emir Neves to me for evaluation  Below are my notes for this consultation  If you have questions, please do not hesitate to call me  I look forward to following your patient along with you  Sincerely,        Génesis Rios DO        CC: Leslie Max Caren Jose martin DO  1/5/2022 11:12 AM  Incomplete  [address] Gastroenterology  Gastroenterology Outpatient Follow up  Patient Og Carrera   Age 55 y o  Gender female   MRN: [id number]  [facility] [id number]     ASSESSMENT AND PLAN:   Problem List Items Addressed This Visit        Digestive    GERD (gastroesophageal reflux disease) - Primary     Yohana Somers does have GERD  This seems to be well controlled with famotidine 40 mg in the evening  She has been taking this about 3 nights a week  Continue with lifestyle modifications as this seemed to improve her symptoms  Lifestyle modifications for gastroesophageal reflux disease were discussed and include limiting fried and fatty foods, mints, chocolates, carbonated and caffeinated beverages , and alcohol, etc   Avoid lying down for 2-3 hours after meals  If you have nighttime symptoms consider raising the head of the bed up on 4-6 inch blocks  Pillows typically are not useful  If you are overweight, weight loss will be helpful  Other    Family history of colon cancer     Trace his mom did have both uterine and colon cancer in her maternal grandmother had colon cancer and maternal cousin had multiple colon polyps in his 45s  Her brother has also had polyps  While this is not consistent with typical presentation of hereditary cancer syndrome, there are multiple relatives with uterine and colon cancer  She did meet with the genetic counselors    We did discuss that she should consider genetic testing as suggested by the genetic counselors  She is giving this careful consideration at this time  Family history of uterine cancer     See comments under family history colon cancer  History of colon polyps     Patient was noted to only have 1 tubular adenoma in the cecum  Remainder of the polyps were hyperplastic including multiple small rectal polyps  Based upon this she will be due for repeat colonoscopy in 5 years  Unless she does undergo genetic testing and she has a specific gene mutation that would suggest sooner colonoscopy then we would follow guidelines based upon genetic test results  _____________________________________________________________    HPI:   Donnell Elaine is a delightful 59-year-old woman who I am seeing in the office in follow-up due to personal history colon polyp, and gastroesophageal reflux  She also has a family history of uterine cancer, colon cancer in her mom and colon cancer maternal grandmother and a maternal cousin had colon polyps in his 45s  Since her last visit she has been using famotidine 40 mg in the evening about 3 times per week  She also has the head of her bed raised up and she has cut back on drinking caffeinated tea  She rarely if ever has breakthrough heartburn  There has been no dysphagia nausea vomiting or early satiety  She denies any diarrhea, constipation, bleeding or black stools  She did see the genetic counselors  She was offered genetic testing but is giving this some careful consideration  Records reviewed for 15 minutes prior to office visit   08/17/2021 EGD   Normal duodenum  Biopsies negative for celiac disease  Mild patchy antral erythema  Mild chronic inactive gastritis  Negative for H pylori  Two small 2-3 mm polyps at the Z-line/ GE junction  Benign polypoid squamocolumnar junction mucosa with regenerative in chronic inflammatory changes    Negative for Bajwa's esophagus  Fewer than 3 eosinophils per high-power field  Otherwise normal squamocolumnar junction and esophagus     08/17/2021 colonoscopy   Normal terminal ileum  6 mm sessile polyp cecum   Tubular adenoma  3 mm sessile polyp distal transverse colon  Biopsy unremarkable  5 mm sessile polyp at 50 cm   Hyperplastic polyp  Several hyperplastic appearing polyps in the rectum 6 removed  Hyperplastic polyps  Mild to moderate diverticulosis sigmoid colon   Mild diverticulosis descending colon   Small external hemorrhoids    No Known Allergies  Current Outpatient Medications   Medication Sig Dispense Refill    famotidine (PEPCID) 40 MG tablet Take 1 tablet (40 mg total) by mouth daily Take 1 tablet 2 hours prior to bed  90 tablet 3    meclizine (ANTIVERT) 12 5 MG tablet Take 1 tablet (12 5 mg total) by mouth every 8 (eight) hours as needed for dizziness 30 tablet 0     No current facility-administered medications for this visit  MEDICAL HISTORY:  Past Medical History:   Diagnosis Date    Abnormal Pap smear of cervix     Allergic rhinitis     Resolved 7/14/2017     Anxiety     Generalized anxiety disorder     Resolved 7/14/2017      Past Surgical History:   Procedure Laterality Date    COLPOSCOPY      NO PAST SURGERIES       Social History     Substance and Sexual Activity   Alcohol Use Yes    Comment: occasional      Social History     Substance and Sexual Activity   Drug Use Never     Social History     Tobacco Use   Smoking Status Former Smoker   Smokeless Tobacco Never Used     Family History   Problem Relation Age of Onset    Colon cancer Mother     Colon polyps Mother     Uterine cancer Mother     Heart disease Father     Colon cancer Maternal Grandmother     Colon cancer Cousin      Objective   Blood pressure 122/80, pulse 65, temperature 98 4 °F (36 9 °C), temperature source Tympanic, resp  rate 16, height 5' 10" (1 778 m), weight 92 2 kg (203 lb 3 2 oz)   Body mass index is 29 16 kg/m²     PHYSICAL EXAM:   General Appearance: Alert, cooperative, no distress  HEENT: Normocephalic, atraumatic, anicteric  Neck: Supple, symmetrical, trachea midline  Lungs: Clear to auscultation bilaterally; no rales, rhonchi or wheezing; respirations unlabored   Heart: Regular rate and rhythm; no murmur, rub, or gallop  Abdomen: Soft, bowel sounds normal, non-tender, non-distended; no masses, there is no hepatosplenomegaly  No spider angiomas  Genitalia: Deferred   Rectal: Deferred   Extremities: No cyanosis, clubbing or edema   Skin: No jaundice, rashes, or lesions   Lymph nodes: No palpable cervical lymphadenopathy   Lab Results:   Lab Requisition on 01/03/2022   Component Date Value    SARS-CoV-2 01/03/2022 Negative      Radiology Results:   No results found  Heath Tierney, DO   01/05/22   Cc:    Jac Loaiza DO  1/4/2022  9:51 AM  Sign when Signing Visit  [facility] Gastroenterology  Gastroenterology Outpatient Follow up  Patient Nicky Aldana   Age 55 y o  Gender female   MRN: [id number]  [facility] [id number]     ASSESSMENT AND PLAN:   Problem List Items Addressed This Visit     None         _____________________________________________________________    HPI:  Please see complete note in epic     08/17/2021 EGD   Normal duodenum  Biopsies negative for celiac disease  Mild patchy antral erythema  Mild chronic inactive gastritis  Negative for H pylori  Two small 2-3 mm polyps at the Z-line/ GE junction  Benign polypoid squamocolumnar junction mucosa with regenerative in chronic inflammatory changes  Negative for Bajwa's esophagus  Fewer than 3 eosinophils per high-power field  Otherwise normal squamocolumnar junction and esophagus     08/17/2021 colonoscopy   Normal terminal ileum  6 mm sessile polyp cecum   Tubular adenoma  3 mm sessile polyp distal transverse colon  Biopsy unremarkable  5 mm sessile polyp at 50 cm     Hyperplastic polyp  Several hyperplastic appearing polyps in the rectum 6 removed  Hyperplastic polyps  Mild to moderate diverticulosis sigmoid colon   Mild diverticulosis descending colon   Small external hemorrhoids    No Known Allergies  Current Outpatient Medications   Medication Sig Dispense Refill    famotidine (PEPCID) 40 MG tablet Take 1 tablet (40 mg total) by mouth daily Take 1 tablet 2 hours prior to bed  90 tablet 3    meclizine (ANTIVERT) 12 5 MG tablet Take 1 tablet (12 5 mg total) by mouth every 8 (eight) hours as needed for dizziness 30 tablet 0     No current facility-administered medications for this visit  MEDICAL HISTORY:  Past Medical History:   Diagnosis Date    Abnormal Pap smear of cervix     Allergic rhinitis     Resolved 7/14/2017     Anxiety     Generalized anxiety disorder     Resolved 7/14/2017      Past Surgical History:   Procedure Laterality Date    COLPOSCOPY      NO PAST SURGERIES       Social History     Substance and Sexual Activity   Alcohol Use Yes    Comment: occasional      Social History     Substance and Sexual Activity   Drug Use Never     Social History     Tobacco Use   Smoking Status Former Smoker   Smokeless Tobacco Never Used     Family History   Problem Relation Age of Onset    Colon cancer Mother     Colon polyps Mother     Uterine cancer Mother     Heart disease Father     Colon cancer Maternal Grandmother     Colon cancer Cousin        REVIEW OF SYSTEMS:  CONSTITUTIONAL: Denies any fever, chills, rigors, and weight loss  HEENT: No earache or tinnitus  Denies hearing loss or visual disturbances  CARDIOVASCULAR: No chest pain or palpitations  RESPIRATORY: Denies any cough, hemoptysis, shortness of breath or dyspnea on exertion  GASTROINTESTINAL: As noted in the History of Present Illness  GENITOURINARY: No problems with urination  Denies any hematuria or dysuria  NEUROLOGIC: No dizziness or vertigo, denies headaches  MUSCULOSKELETAL: Denies any muscle or joint pain     SKIN: Denies skin rashes or itching  ENDOCRINE: Denies excessive thirst  Denies intolerance to heat or cold  PSYCHOSOCIAL: Denies depression or anxiety  Denies any recent memory loss  Objective   There were no vitals taken for this visit  There is no height or weight on file to calculate BMI  PHYSICAL EXAM:   General Appearance: Alert, cooperative, no distress  HEENT: Normocephalic, atraumatic, anicteric  Neck: Supple, symmetrical, trachea midline  Lungs: Clear to auscultation bilaterally; no rales, rhonchi or wheezing; respirations unlabored   Heart: Regular rate and rhythm; no murmur, rub, or gallop  Abdomen: Soft, bowel sounds normal, non-tender, non-distended; no masses, there is no hepatosplenomegaly  No spider angiomas  Genitalia: Deferred   Rectal: Deferred   Extremities: No cyanosis, clubbing or edema   Skin: No jaundice, rashes, or lesions   Lymph nodes: No palpable cervical lymphadenopathy   Lab Results:   No visits with results within 2 Month(s) from this visit  Latest known visit with results is:   Lab Requisition on 08/30/2021   Component Date Value    SARS-CoV-2 08/30/2021 Negative      Radiology Results:   No results found    Victor Manuel Tierney DO   01/04/22   Cc:

## 2022-01-05 NOTE — ASSESSMENT & PLAN NOTE
Patient was noted to only have 1 tubular adenoma in the cecum  Remainder of the polyps were hyperplastic including multiple small rectal polyps  Based upon this she will be due for repeat colonoscopy in 5 years  Unless she does undergo genetic testing and she has a specific gene mutation that would suggest sooner colonoscopy then we would follow guidelines based upon genetic test results

## 2022-01-05 NOTE — ASSESSMENT & PLAN NOTE
Trace his mom did have both uterine and colon cancer in her maternal grandmother had colon cancer and maternal cousin had multiple colon polyps in his 45s  Her brother has also had polyps  While this is not consistent with typical presentation of hereditary cancer syndrome, there are multiple relatives with uterine and colon cancer  She did meet with the genetic counselors  We did discuss that she should consider genetic testing as suggested by the genetic counselors  She is giving this careful consideration at this time

## 2022-01-05 NOTE — ASSESSMENT & PLAN NOTE
[facility] does have GERD  This seems to be well controlled with famotidine 40 mg in the evening  She has been taking this about 3 nights a week  Continue with lifestyle modifications as this seemed to improve her symptoms  Lifestyle modifications for gastroesophageal reflux disease were discussed and include limiting fried and fatty foods, mints, chocolates, carbonated and caffeinated beverages , and alcohol, etc   Avoid lying down for 2-3 hours after meals  If you have nighttime symptoms consider raising the head of the bed up on 4-6 inch blocks  Pillows typically are not useful  If you are overweight, weight loss will be helpful

## 2022-02-16 ENCOUNTER — IMMUNIZATIONS (OUTPATIENT)
Dept: FAMILY MEDICINE CLINIC | Facility: HOSPITAL | Age: 47
End: 2022-02-16

## 2022-02-16 DIAGNOSIS — Z23 ENCOUNTER FOR IMMUNIZATION: Primary | ICD-10-CM

## 2022-02-16 PROCEDURE — 0064A COVID-19 MODERNA VACC 0.25 ML BOOSTER: CPT

## 2022-02-16 PROCEDURE — 91306 COVID-19 MODERNA VACC 0.25 ML BOOSTER: CPT

## 2022-02-24 ENCOUNTER — TELEPHONE (OUTPATIENT)
Dept: FAMILY MEDICINE CLINIC | Facility: CLINIC | Age: 47
End: 2022-02-24

## 2022-02-24 DIAGNOSIS — J01.90 ACUTE NON-RECURRENT SINUSITIS, UNSPECIFIED LOCATION: Primary | ICD-10-CM

## 2022-02-24 RX ORDER — AZITHROMYCIN 250 MG/1
TABLET, FILM COATED ORAL
Qty: 6 TABLET | Refills: 0 | Status: SHIPPED | OUTPATIENT
Start: 2022-02-24 | End: 2022-02-28

## 2022-02-24 NOTE — TELEPHONE ENCOUNTER
Rx put in for a Z-Butch  Push fluids  Get Mucinex over-the-counter  Call symptoms continue or increase

## 2022-02-24 NOTE — TELEPHONE ENCOUNTER
Pt c/o sinus pressure and headache  Has been taking allergy medication and using a humidifer but not helping  Asking if you can send an antibiotic to pharmacy?

## 2022-03-16 ENCOUNTER — OFFICE VISIT (OUTPATIENT)
Dept: FAMILY MEDICINE CLINIC | Facility: CLINIC | Age: 47
End: 2022-03-16
Payer: COMMERCIAL

## 2022-03-16 VITALS
HEIGHT: 70 IN | WEIGHT: 207.8 LBS | DIASTOLIC BLOOD PRESSURE: 74 MMHG | TEMPERATURE: 97.9 F | HEART RATE: 82 BPM | OXYGEN SATURATION: 98 % | BODY MASS INDEX: 29.75 KG/M2 | SYSTOLIC BLOOD PRESSURE: 116 MMHG

## 2022-03-16 DIAGNOSIS — K21.9 GASTROESOPHAGEAL REFLUX DISEASE, UNSPECIFIED WHETHER ESOPHAGITIS PRESENT: ICD-10-CM

## 2022-03-16 DIAGNOSIS — J01.90 ACUTE NON-RECURRENT SINUSITIS, UNSPECIFIED LOCATION: ICD-10-CM

## 2022-03-16 DIAGNOSIS — Z00.00 HEALTHCARE MAINTENANCE: Primary | ICD-10-CM

## 2022-03-16 DIAGNOSIS — J30.1 NON-SEASONAL ALLERGIC RHINITIS DUE TO POLLEN: Primary | ICD-10-CM

## 2022-03-16 PROBLEM — F41.9 ANXIETY: Status: RESOLVED | Noted: 2019-11-26 | Resolved: 2022-03-16

## 2022-03-16 PROCEDURE — 99214 OFFICE O/P EST MOD 30 MIN: CPT | Performed by: FAMILY MEDICINE

## 2022-03-16 RX ORDER — FLUTICASONE PROPIONATE 50 MCG
2 SPRAY, SUSPENSION (ML) NASAL DAILY
Qty: 16 G | Refills: 3 | Status: SHIPPED | OUTPATIENT
Start: 2022-03-16

## 2022-03-16 NOTE — PROGRESS NOTES
Assessment/Plan:  Rx for Flonase nose spray  Continue other medications  Follow-up in 6 months or p r n  Problem List Items Addressed This Visit        Digestive    GERD (gastroesophageal reflux disease)       Respiratory    Non-seasonal allergic rhinitis due to pollen - Primary    Relevant Medications    fluticasone (FLONASE) 50 mcg/act nasal spray      Other Visit Diagnoses     Acute non-recurrent sinusitis, unspecified location               Diagnoses and all orders for this visit:    Non-seasonal allergic rhinitis due to pollen  -     fluticasone (FLONASE) 50 mcg/act nasal spray; 2 sprays into each nostril daily    Acute non-recurrent sinusitis, unspecified location    Gastroesophageal reflux disease, unspecified whether esophagitis present        No problem-specific Assessment & Plan notes found for this encounter  Subjective:      Patient ID: Skyler Haji is a 55 y o  female  Patient here today for follow-up  Patient denies any chest pain or shortness of breath  Patient getting over her sinusitis  Patient does have trouble with allergies  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  Patients GERD has been controlled on the Pepcid  The following portions of the patient's history were reviewed and updated as appropriate:   She has a past medical history of Abnormal Pap smear of cervix, Allergic rhinitis, Anxiety, and Generalized anxiety disorder  ,  does not have any pertinent problems on file  ,   has a past surgical history that includes Colposcopy and No past surgeries  ,  family history includes Colon cancer in her cousin, maternal grandmother, and mother; Colon polyps in her mother; Heart disease in her father; Uterine cancer in her mother  ,   reports that she has quit smoking  She has never used smokeless tobacco  She reports current alcohol use  She reports that she does not use drugs  ,  has No Known Allergies     Current Outpatient Medications   Medication Sig Dispense Refill    famotidine (PEPCID) 40 MG tablet Take 1 tablet (40 mg total) by mouth daily Take 1 tablet 2 hours prior to bed  90 tablet 3    fluticasone (FLONASE) 50 mcg/act nasal spray 2 sprays into each nostril daily 16 g 3    meclizine (ANTIVERT) 12 5 MG tablet Take 1 tablet (12 5 mg total) by mouth every 8 (eight) hours as needed for dizziness 30 tablet 0     No current facility-administered medications for this visit  Review of Systems   Constitutional: Negative  HENT: Positive for congestion  Respiratory: Negative  Cardiovascular: Negative  Gastrointestinal: Negative  Genitourinary: Negative  Objective:  Vitals:    03/16/22 1632   BP: 116/74   Pulse: 82   Temp: 97 9 °F (36 6 °C)   SpO2: 98%   Weight: 94 3 kg (207 lb 12 8 oz)   Height: 5' 10" (1 778 m)     Body mass index is 29 82 kg/m²  Physical Exam  Vitals reviewed  Constitutional:       General: She is not in acute distress  Appearance: Normal appearance  She is well-developed  She is not ill-appearing, toxic-appearing or diaphoretic  HENT:      Head: Normocephalic and atraumatic  Eyes:      Conjunctiva/sclera: Conjunctivae normal    Cardiovascular:      Rate and Rhythm: Normal rate and regular rhythm  Heart sounds: Normal heart sounds  No murmur heard  No friction rub  No gallop  Pulmonary:      Effort: Pulmonary effort is normal  No respiratory distress  Breath sounds: Normal breath sounds  No wheezing, rhonchi or rales  Musculoskeletal:      Right lower leg: No edema  Left lower leg: No edema  Neurological:      General: No focal deficit present  Mental Status: She is alert and oriented to person, place, and time  Psychiatric:         Mood and Affect: Mood normal          Behavior: Behavior normal          Thought Content:  Thought content normal          Judgment: Judgment normal

## 2022-06-03 ENCOUNTER — APPOINTMENT (OUTPATIENT)
Dept: LAB | Facility: MEDICAL CENTER | Age: 47
End: 2022-06-03

## 2022-06-03 DIAGNOSIS — Z00.8 HEALTH EXAMINATION IN POPULATION SURVEY: ICD-10-CM

## 2022-06-03 LAB
CHOLEST SERPL-MCNC: 178 MG/DL
EST. AVERAGE GLUCOSE BLD GHB EST-MCNC: 117 MG/DL
HBA1C MFR BLD: 5.7 %
HDLC SERPL-MCNC: 44 MG/DL
LDLC SERPL CALC-MCNC: 102 MG/DL (ref 0–100)
NONHDLC SERPL-MCNC: 134 MG/DL
TRIGL SERPL-MCNC: 158 MG/DL

## 2022-06-03 PROCEDURE — 80061 LIPID PANEL: CPT

## 2022-06-03 PROCEDURE — 83036 HEMOGLOBIN GLYCOSYLATED A1C: CPT

## 2022-06-03 PROCEDURE — 36415 COLL VENOUS BLD VENIPUNCTURE: CPT

## 2022-06-17 ENCOUNTER — ANNUAL EXAM (OUTPATIENT)
Dept: OBGYN CLINIC | Facility: CLINIC | Age: 47
End: 2022-06-17
Payer: COMMERCIAL

## 2022-06-17 VITALS
HEIGHT: 70 IN | WEIGHT: 208 LBS | BODY MASS INDEX: 29.78 KG/M2 | DIASTOLIC BLOOD PRESSURE: 78 MMHG | SYSTOLIC BLOOD PRESSURE: 118 MMHG

## 2022-06-17 DIAGNOSIS — N94.3 PMS (PREMENSTRUAL SYNDROME): ICD-10-CM

## 2022-06-17 DIAGNOSIS — Z12.31 ENCOUNTER FOR SCREENING MAMMOGRAM FOR BREAST CANCER: ICD-10-CM

## 2022-06-17 DIAGNOSIS — G43.829 MENSTRUAL MIGRAINE WITHOUT STATUS MIGRAINOSUS, NOT INTRACTABLE: ICD-10-CM

## 2022-06-17 DIAGNOSIS — Z01.419 ROUTINE GYNECOLOGICAL EXAMINATION: Primary | ICD-10-CM

## 2022-06-17 PROCEDURE — S0612 ANNUAL GYNECOLOGICAL EXAMINA: HCPCS | Performed by: ADVANCED PRACTICE MIDWIFE

## 2022-06-17 RX ORDER — MULTIVITAMIN WITH IRON
TABLET ORAL
Start: 2022-06-17

## 2022-06-17 RX ORDER — CALCIUM CARBONATE/VITAMIN D3 500-10/5ML
LIQUID (ML) ORAL
Refills: 0
Start: 2022-06-17

## 2022-06-17 NOTE — PROGRESS NOTES
[facility] [address] [place] [address]    ASSESSMENT/PLAN: Joyce Narvaez is a 55 y o  [id number] who presents for annual gynecologic exam     Encounter for routine gynecologic examination  - Routine well woman exam completed today  - Cervical Cancer Screening: Current ASCCP Guidelines reviewed  Last Pap: 06/04/2021 normal pap smear and HPV  Next Pap Due: 1-2 yrs  - HPV Vaccination status: Not immunized  - STI screening offered including HIV: not indicated based on hx or requested at time of visit  - Contraceptive counseling discussed  Current contraception: natural family planning   - Breast Cancer Screening: Last Mammogram Not on file, script given for first mammogram  - RTO 1 yr or PRN    Additional problems addressed at this visit:  1  Routine gynecological examination  -     Mammo screening bilateral w 3d & cad; Future; Expected date: 06/17/2022    2  Encounter for screening mammogram for breast cancer  -     Mammo screening bilateral w 3d & cad; Future; Expected date: 06/17/2022    3  Menstrual migraine without status migrainosus, not intractable  -     Magnesium Oxide 400 MG CAPS; Take 1 tablet 1 to 2 times daily with onset of menstrual symptoms  -     pyridoxine (VITAMIN B6) 100 mg tablet; Start week before menses and week of menses  100 mg 1-3 times per day  4  PMS (premenstrual syndrome)  -     Magnesium Oxide 400 MG CAPS; Take 1 tablet 1 to 2 times daily with onset of menstrual symptoms  -     pyridoxine (VITAMIN B6) 100 mg tablet; Start week before menses and week of menses  100 mg 1-3 times per day  Vitamin B6- 100mg 3 times per day week prior to menses and week of menses  400 Magnesium oxide- with onset of headache 1- 2 times per day  CC:  Annual Gynecologic Examination    HPI: Joyce Narvaez is a 55 y o  [id number] who presents for annual gynecologic examination  Thomas Watkins presents for gyn exam today  6/3/22 LMP  Menses is every 21 days, flow 4-5   Using natural family planning as birth control method  Happy with method  Last pap smear 6/2021- normal   Script given today for mammogram- she has not had a mammogram to date  6 hrs sleep per day  1-3 servings of calcium rich food per day  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  1-2 servings of caffeine per day  Occasionally  perform SBE monthly  7/2021 colonoscopy- 9 polyps and 1 was precancerous and recommended 5 yrs     Concerns : feels that she is getting period migraines  Not every month but is associated with menses and states that her mother also had symptoms  The following portions of the patient's history were reviewed and updated as appropriate: allergies, current medications, past family history, past medical history, obstetric history, gynecologic history, past social history, past surgical history and problem list     Review of Systems   Constitutional: Negative for activity change, appetite change, fatigue and fever  Respiratory: Negative for cough and shortness of breath  Cardiovascular: Negative for chest pain, palpitations and leg swelling  Gastrointestinal: Positive for constipation  Negative for diarrhea  Genitourinary: Negative for difficulty urinating, dysuria, frequency, menstrual problem, pelvic pain, vaginal bleeding, vaginal discharge and vaginal pain  Neurological: Positive for headaches  Negative for light-headedness  Psychiatric/Behavioral: The patient is not nervous/anxious  Objective:  /78   Ht 5' 10" (1 778 m)   Wt 94 3 kg (208 lb)   LMP 06/12/2022   BMI 29 84 kg/m²    Physical Exam  Vitals reviewed  Constitutional:       Appearance: Normal appearance  HENT:      Head: Normocephalic  Neck:      Thyroid: No thyroid mass or thyroid tenderness  Cardiovascular:      Rate and Rhythm: Normal rate and regular rhythm  Heart sounds: Normal heart sounds  Pulmonary:      Effort: Pulmonary effort is normal       Breath sounds: Normal breath sounds     Chest:   Breasts: Right: No mass, nipple discharge, skin change, tenderness or axillary adenopathy  Left: No mass, nipple discharge, skin change, tenderness or axillary adenopathy  Abdominal:      General: There is no distension  Palpations: There is no mass  Tenderness: There is no abdominal tenderness  There is no guarding  Genitourinary:     General: Normal vulva  Exam position: Lithotomy position  Labia:         Right: No tenderness or lesion  Left: No tenderness or lesion  Vagina: No vaginal discharge, tenderness, bleeding or lesions  Cervix: No discharge, lesion, erythema or cervical bleeding  Uterus: Normal  Not enlarged and not tender  Adnexa:         Right: No mass, tenderness or fullness  Left: No mass, tenderness or fullness  Musculoskeletal:      Cervical back: Normal range of motion  Lymphadenopathy:      Upper Body:      Right upper body: No axillary adenopathy  Left upper body: No axillary adenopathy  Skin:     General: Skin is warm and dry  Neurological:      Mental Status: She is alert     Psychiatric:         Mood and Affect: Mood normal          Behavior: Behavior normal          Judgment: Judgment normal              Camillia Sandhoff, CNM  [facility]  06/17/22 10:58 AM

## 2022-08-03 ENCOUNTER — TELEPHONE (OUTPATIENT)
Dept: FAMILY MEDICINE CLINIC | Facility: CLINIC | Age: 47
End: 2022-08-03

## 2022-08-03 DIAGNOSIS — J01.01 ACUTE RECURRENT MAXILLARY SINUSITIS: Primary | ICD-10-CM

## 2022-08-03 RX ORDER — AMOXICILLIN 500 MG/1
500 CAPSULE ORAL EVERY 8 HOURS SCHEDULED
Qty: 30 CAPSULE | Refills: 0 | Status: SHIPPED | OUTPATIENT
Start: 2022-08-03 | End: 2022-08-13

## 2022-08-03 NOTE — TELEPHONE ENCOUNTER
C/o pressure in sinuses, blowing out yellow in the morning  Can you rx something for her to [facility]?

## 2022-08-09 ENCOUNTER — TELEPHONE (OUTPATIENT)
Dept: FAMILY MEDICINE CLINIC | Facility: CLINIC | Age: 47
End: 2022-08-09

## 2022-08-09 DIAGNOSIS — T63.441A BEE STING, ACCIDENTAL OR UNINTENTIONAL, INITIAL ENCOUNTER: Primary | ICD-10-CM

## 2022-08-09 RX ORDER — PREDNISONE 10 MG/1
TABLET ORAL
Qty: 11 TABLET | Refills: 0 | Status: SHIPPED | OUTPATIENT
Start: 2022-08-09 | End: 2022-09-02

## 2022-08-09 NOTE — TELEPHONE ENCOUNTER
Got stung by a bee yesterday on foot  She did take benadryl and it helped some but it is still itching and swollen  Do you think she should have a steroid pack?

## 2022-09-02 ENCOUNTER — OFFICE VISIT (OUTPATIENT)
Dept: FAMILY MEDICINE CLINIC | Facility: CLINIC | Age: 47
End: 2022-09-02
Payer: COMMERCIAL

## 2022-09-02 VITALS
DIASTOLIC BLOOD PRESSURE: 78 MMHG | SYSTOLIC BLOOD PRESSURE: 120 MMHG | BODY MASS INDEX: 29.09 KG/M2 | TEMPERATURE: 97.6 F | HEART RATE: 70 BPM | OXYGEN SATURATION: 97 % | HEIGHT: 70 IN | WEIGHT: 203.2 LBS

## 2022-09-02 DIAGNOSIS — Z00.00 ANNUAL PHYSICAL EXAM: ICD-10-CM

## 2022-09-02 DIAGNOSIS — Z91.030 ALLERGY TO HONEY BEE VENOM: Primary | ICD-10-CM

## 2022-09-02 DIAGNOSIS — R42 DIZZINESS: ICD-10-CM

## 2022-09-02 PROCEDURE — 99396 PREV VISIT EST AGE 40-64: CPT | Performed by: FAMILY MEDICINE

## 2022-09-02 RX ORDER — EPINEPHRINE 0.3 MG/.3ML
0.3 INJECTION SUBCUTANEOUS ONCE
Qty: 0.6 ML | Refills: 0 | Status: SHIPPED | OUTPATIENT
Start: 2022-09-02 | End: 2022-09-02

## 2022-09-02 RX ORDER — MECLIZINE HCL 12.5 MG/1
12.5 TABLET ORAL EVERY 8 HOURS PRN
Qty: 30 TABLET | Refills: 0 | Status: SHIPPED | OUTPATIENT
Start: 2022-09-02

## 2022-09-02 NOTE — PROGRESS NOTES
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    Relevant Medications    EPINEPHrine (EPIPEN) 0 3 mg/0 3 mL SOAJ      Other Visit Diagnoses     Annual physical exam        BMI 29 0-29 9,adult              Immunizations and preventive care screenings were discussed with patient today  Appropriate education was printed on patient's after visit summary  Counseling:  Dental Health: discussed importance of regular tooth brushing, flossing, and dental visits  Exercise: the importance of regular exercise/physical activity was discussed  Recommend exercise 3-5 times per week for at least 30 minutes  BMI Counseling: Body mass index is 29 16 kg/m²  The BMI is above normal  Nutrition recommendations include decreasing portion sizes, encouraging healthy choices of fruits and vegetables, decreasing fast food intake, consuming healthier snacks, limiting drinks that contain sugar, moderation in carbohydrate intake, increasing intake of lean protein, reducing intake of saturated and trans fat and reducing intake of cholesterol  Exercise recommendations include exercising 3-5 times per week  No pharmacotherapy was ordered  Rationale for BMI follow-up plan is due to patient being overweight or obese  Depression Screening and Follow-up Plan: Patient was screened for depression during today's encounter  They screened negative with a PHQ-2 score of 0  Return in about 6 months (around 3/2/2023) for Recheck       Chief Complaint:     Chief Complaint   Patient presents with    Annual Exam     Doing good      History of Present Illness:     Adult Annual Physical   Patient here for a comprehensive physical exam  The patient reports problems - bigger reactions to bee stings  Diet and Physical Activity  Diet/Nutrition: well balanced diet  Exercise: walking  Depression Screening  PHQ-2/9 Depression Screening    Little interest or pleasure in doing things: 0 - not at all  Feeling down, depressed, or hopeless: 0 - not at all  PHQ-2 Score: 0  PHQ-2 Interpretation: Negative depression screen       General Health  Sleep: sleeps well  Hearing: normal - bilateral   Vision: no vision problems  Dental: regular dental visits  /GYN Health  Patient is: premenopausal  Last menstrual period: last month       Review of Systems:     Review of Systems   Constitutional: Negative  Respiratory: Negative  Cardiovascular: Negative  Gastrointestinal: Negative  Genitourinary: Negative         Past Medical History:     Past Medical History:   Diagnosis Date    Abnormal Pap smear of cervix     Allergic rhinitis     Resolved 7/14/2017     Anxiety     Generalized anxiety disorder     Resolved 7/14/2017       Past Surgical History:     Past Surgical History:   Procedure Laterality Date    COLPOSCOPY      NO PAST SURGERIES        Social History:     Social History     Socioeconomic History    Marital status: /Civil Union     Spouse name: None    Number of children: None    Years of education: None    Highest education level: None   Occupational History    None   Tobacco Use    Smoking status: Former Smoker    Smokeless tobacco: Never Used   Vaping Use    Vaping Use: Never used   Substance and Sexual Activity    Alcohol use: Yes     Comment: occasional     Drug use: Never    Sexual activity: Yes     Partners: Male     Birth control/protection: Rhythm   Other Topics Concern    None   Social History Narrative    None     Social Determinants of Health     Financial Resource Strain: Not on file   Food Insecurity: Not on file   Transportation Needs: Not on file   Physical Activity: Not on file   Stress: Not on file   Social Connections: Not on file   Intimate Partner Violence: Not on file   Housing Stability: Not on file      Family History:     Family History   Problem Relation Age of Onset    Cancer Mother     Colon cancer Mother     Colon polyps Mother     Uterine cancer Mother     Heart disease Father     Colon cancer Maternal Grandmother     Colon cancer Cousin       Current Medications:     Current Outpatient Medications   Medication Sig Dispense Refill    EPINEPHrine (EPIPEN) 0 3 mg/0 3 mL SOAJ Inject 0 3 mL (0 3 mg total) into a muscle once for 1 dose PRN bee sting 0 6 mL 0    famotidine (PEPCID) 40 MG tablet Take 1 tablet (40 mg total) by mouth daily Take 1 tablet 2 hours prior to bed  90 tablet 3    fluticasone (FLONASE) 50 mcg/act nasal spray 2 sprays into each nostril daily 16 g 3    Magnesium Oxide 400 MG CAPS Take 1 tablet 1 to 2 times daily with onset of menstrual symptoms  0    meclizine (ANTIVERT) 12 5 MG tablet Take 1 tablet (12 5 mg total) by mouth every 8 (eight) hours as needed for dizziness 30 tablet 0    pyridoxine (VITAMIN B6) 100 mg tablet Start week before menses and week of menses  100 mg 1-3 times per day  (Patient not taking: Reported on 9/2/2022)       No current facility-administered medications for this visit  Allergies:     No Known Allergies   Physical Exam:     /78   Pulse 70   Temp 97 6 °F (36 4 °C)   Ht 5' 10" (1 778 m)   Wt 92 2 kg (203 lb 3 2 oz)   SpO2 97%   BMI 29 16 kg/m²     Physical Exam  Vitals reviewed  Constitutional:       General: She is not in acute distress  Appearance: Normal appearance  She is well-developed  She is not diaphoretic  HENT:      Head: Normocephalic and atraumatic  Eyes:      Conjunctiva/sclera: Conjunctivae normal    Cardiovascular:      Rate and Rhythm: Normal rate and regular rhythm  Heart sounds: Normal heart sounds  No murmur heard      No friction rub  No gallop  Pulmonary:      Effort: Pulmonary effort is normal  No respiratory distress  Breath sounds: Normal breath sounds  No wheezing, rhonchi or rales  Musculoskeletal:      Right lower leg: No edema  Left lower leg: No edema  Neurological:      General: No focal deficit present  Mental Status: She is alert and oriented to person, place, and time  Psychiatric:         Mood and Affect: Mood normal          Behavior: Behavior normal          Thought Content: Thought content normal          Judgment: Judgment normal           [place] Jonathon,   [facility]  BMI Counseling: Body mass index is 29 16 kg/m²  The BMI is above normal  Nutrition recommendations include reducing portion sizes, decreasing overall calorie intake, 3-5 servings of fruits/vegetables daily, reducing fast food intake, consuming healthier snacks, decreasing soda and/or juice intake, moderation in carbohydrate intake, increasing intake of lean protein, reducing intake of saturated fat and trans fat and reducing intake of cholesterol  Exercise recommendations include exercising 3-5 times per week

## 2022-09-02 NOTE — PATIENT INSTRUCTIONS
Wellness Visit for Adults   AMBULATORY CARE:   A wellness visit  is when you see your healthcare provider to get screened for health problems  Your healthcare provider will also give you advice on how to stay healthy  Write down your questions so you remember to ask them  Ask your healthcare provider how often you should have a wellness visit  What happens at a wellness visit:  Your healthcare provider will ask about your health, and your family history of health problems  This includes high blood pressure, heart disease, and cancer  He or she will ask if you have symptoms that concern you, if you smoke, and about your mood  You may also be asked about your intake of medicines, supplements, food, and alcohol  Any of the following may be done:  · Your weight  will be checked  Your height may also be checked so your body mass index (BMI) can be calculated  Your BMI shows if you are at a healthy weight  · Your blood pressure  and heart rate will be checked  Your temperature may also be checked  · Blood and urine tests  may be done  Blood tests may be done to check your cholesterol levels  Abnormal cholesterol levels increase your risk for heart disease and stroke  You may also need a blood or urine test to check for diabetes if you are at increased risk  Urine tests may be done to look for signs of an infection or kidney disease  · A physical exam  includes checking your heartbeat and lungs with a stethoscope  Your healthcare provider may also check your skin to look for sun damage  · Screening tests  may be recommended  A screening test is done to check for diseases that may not cause symptoms  The screening tests you may need depend on your age, gender, family history, and lifestyle habits  For example, colorectal screening may be recommended if you are 48years old or older  Screening tests you need if you are a woman:   · A Pap smear  is used to screen for cervical cancer   Pap smears are usually done every 3 to 5 years depending on your age  You may need them more often if you have had abnormal Pap smear test results in the past  Ask your healthcare provider how often you should have a Pap smear  · A mammogram  is an x-ray of your breasts to screen for breast cancer  Experts recommend mammograms every 2 years starting at age 48 years  You may need a mammogram at age 52 years or younger if you have an increased risk for breast cancer  Talk to your healthcare provider about when you should start having mammograms and how often you need them  Vaccines you may need:   · Get an influenza vaccine  every year  The influenza vaccine protects you from the flu  Several types of viruses cause the flu  The viruses change over time, so new vaccines are made each year  · Get a tetanus-diphtheria (Td) booster vaccine  every 10 years  This vaccine protects you against tetanus and diphtheria  Tetanus is a severe infection that may cause painful muscle spasms and lockjaw  Diphtheria is a severe bacterial infection that causes a thick covering in the back of your mouth and throat  · Get a human papillomavirus (HPV) vaccine  if you are female and aged 23 to 32 or male 23 to 24 and never received it  This vaccine protects you from HPV infection  HPV is the most common infection spread by sexual contact  HPV may also cause vaginal, penile, and anal cancers  · Get a pneumococcal vaccine  if you are aged 72 years or older  The pneumococcal vaccine is an injection given to protect you from pneumococcal disease  Pneumococcal disease is an infection caused by pneumococcal bacteria  The infection may cause pneumonia, meningitis, or an ear infection  · Get a shingles vaccine  if you are 60 or older, even if you have had shingles before  The shingles vaccine is an injection to protect you from the varicella-zoster virus  This is the same virus that causes chickenpox   Shingles is a painful rash that develops in people who had chickenpox or have been exposed to the virus  How to eat healthy:  My Plate is a model for planning healthy meals  It shows the types and amounts of foods that should go on your plate  Fruits and vegetables make up about half of your plate, and grains and protein make up the other half  A serving of dairy is included on the side of your plate  The amount of calories and serving sizes you need depends on your age, gender, weight, and height  Examples of healthy foods are listed below:  · Eat a variety of vegetables  such as dark green, red, and orange vegetables  You can also include canned vegetables low in sodium (salt) and frozen vegetables without added butter or sauces  · Eat a variety of fresh fruits , canned fruit in 100% juice, frozen fruit, and dried fruit  · Include whole grains  At least half of the grains you eat should be whole grains  Examples include whole-wheat bread, wheat pasta, brown rice, and whole-grain cereals such as oatmeal     · Eat a variety of protein foods such as seafood (fish and shellfish), lean meat, and poultry without skin (turkey and chicken)  Examples of lean meats include pork leg, shoulder, or tenderloin, and beef round, sirloin, tenderloin, and extra lean ground beef  Other protein foods include eggs and egg substitutes, beans, peas, soy products, nuts, and seeds  · Choose low-fat dairy products such as skim or 1% milk or low-fat yogurt, cheese, and cottage cheese  · Limit unhealthy fats  such as butter, hard margarine, and shortening  Exercise:  Exercise at least 30 minutes per day on most days of the week  Some examples of exercise include walking, biking, dancing, and swimming  You can also fit in more physical activity by taking the stairs instead of the elevator or parking farther away from stores  Include muscle strengthening activities 2 days each week  Regular exercise provides many health benefits   It helps you manage your weight, and decreases your risk for type 2 diabetes, heart disease, stroke, and high blood pressure  Exercise can also help improve your mood  Ask your healthcare provider about the best exercise plan for you  General health and safety guidelines:   · Do not smoke  Nicotine and other chemicals in cigarettes and cigars can cause lung damage  Ask your healthcare provider for information if you currently smoke and need help to quit  E-cigarettes or smokeless tobacco still contain nicotine  Talk to your healthcare provider before you use these products  · Limit alcohol  A drink of alcohol is 12 ounces of beer, 5 ounces of wine, or 1½ ounces of liquor  · Lose weight, if needed  Being overweight increases your risk of certain health conditions  These include heart disease, high blood pressure, type 2 diabetes, and certain types of cancer  · Protect your skin  Do not sunbathe or use tanning beds  Use sunscreen with a SPF 15 or higher  Apply sunscreen at least 15 minutes before you go outside  Reapply sunscreen every 2 hours  Wear protective clothing, hats, and sunglasses when you are outside  · Drive safely  Always wear your seatbelt  Make sure everyone in your car wears a seatbelt  A seatbelt can save your life if you are in an accident  Do not use your cell phone when you are driving  This could distract you and cause an accident  Pull over if you need to make a call or send a text message  · Practice safe sex  Use latex condoms if are sexually active and have more than one partner  Your healthcare provider may recommend screening tests for sexually transmitted infections (STIs)  · Wear helmets, lifejackets, and protective gear  Always wear a helmet when you ride a bike or motorcycle, go skiing, or play sports that could cause a head injury  Wear protective equipment when you play sports  Wear a lifejacket when you are on a boat or doing water sports      © Copyright [employer] 2022 Information is for End User's use only and may not be sold, redistributed or otherwise used for commercial purposes  All illustrations and images included in CareNotes® are the copyrighted property of A [employer] M [employer]  or Mickey Morrow  The above information is an  only  It is not intended as medical advice for individual conditions or treatments  Talk to your doctor, nurse or pharmacist before following any medical regimen to see if it is safe and effective for you  Heart Healthy Diet   AMBULATORY CARE:   A heart healthy diet  is an eating plan low in unhealthy fats and sodium (salt)  The plan is high in healthy fats and fiber  A heart healthy diet helps improve your cholesterol levels and lowers your risk for heart disease and stroke  A dietitian will teach you how to read and understand food labels  Heart healthy diet guidelines to follow:   · Choose foods that contain healthy fats  ? Unsaturated fats  include monounsaturated and polyunsaturated fats  Unsaturated fat is found in foods such as soybean, canola, olive, corn, and safflower oils  It is also found in soft tub margarine that is made with liquid vegetable oil  ? Omega-3 fat  is found in certain fish, such as salmon, tuna, and trout, and in walnuts and flaxseed  Eat fish high in omega-3 fats at least 2 times a week  · Get 20 to 30 grams of fiber each day  Fruits, vegetables, whole-grain foods, and legumes (cooked beans) are good sources of fiber  · Limit or do not have unhealthy fats  ? Cholesterol  is found in animal foods, such as eggs and lobster, and in dairy products made from whole milk  Limit cholesterol to less than 200 mg each day  ? Saturated fat  is found in meats, such as dawson and hamburger  It is also found in chicken or turkey skin, whole milk, and butter  Limit saturated fat to less than 7% of your total daily calories  ? Trans fat  is found in packaged foods, such as potato chips and cookies   It is also in hard margarine, some fried foods, and shortening  Do not eat foods that contain trans fats  · Limit sodium as directed  You may be told to limit sodium to 2,000 to 2,300 mg each day  Choose low-sodium or no-salt-added foods  Add little or no salt to food you prepare  Use herbs and spices in place of salt  Include the following in your heart healthy plan:  Ask your dietitian or healthcare provider how many servings to have from each of the following food groups:  · Grains:      ? Whole-wheat breads, cereals, and pastas, and brown rice    ? Low-fat, low-sodium crackers and chips    · Vegetables:      ? Broccoli, green beans, green peas, and spinach    ? Collards, kale, and lima beans    ? Carrots, sweet potatoes, tomatoes, and peppers    ? Canned vegetables with no salt added    · Fruits:      ? Bananas, peaches, pears, and pineapple    ? Grapes, raisins, and dates    ? Oranges, tangerines, grapefruit, orange juice, and grapefruit juice    ? Apricots, mangoes, melons, and papaya    ? Raspberries and strawberries    ? Canned fruit with no added sugar    · Low-fat dairy:      ? Nonfat (skim) milk, 1% milk, and low-fat almond, cashew, or soy milks fortified with calcium    ? Low-fat cheese, regular or frozen yogurt, and cottage cheese    · Meats and proteins:      ? Lean cuts of beef and pork (loin, leg, round), skinless chicken and turkey    ? Legumes, soy products, egg whites, or nuts    Limit or do not include the following in your heart healthy plan:   · Unhealthy fats and oils:      ? Whole or 2% milk, cream cheese, sour cream, or cheese    ? High-fat cuts of beef (T-bone steaks, ribs), chicken or turkey with skin, and organ meats such as liver    ?  Butter, stick margarine, shortening, and cooking oils such as coconut or palm oil    · Foods and liquids high in sodium:      ? Packaged foods, such as frozen dinners, cookies, macaroni and cheese, and cereals with more than 300 mg of sodium per serving    ? Vegetables with added sodium, such as instant potatoes, vegetables with added sauces, or regular canned vegetables    ? Cured or smoked meats, such as hot dogs, dawson, and sausage    ? High-sodium ketchup, barbecue sauce, salad dressing, pickles, olives, soy sauce, or miso    · Foods and liquids high in sugar:      ? Candy, cake, cookies, pies, or doughnuts    ? Soft drinks (soda), sports drinks, or sweetened tea    ? Canned or dry mixes for cakes, soups, sauces, or gravies    Other healthy heart guidelines:   · Do not smoke  Nicotine and other chemicals in cigarettes and cigars can cause lung and heart damage  Ask your healthcare provider for information if you currently smoke and need help to quit  E-cigarettes or smokeless tobacco still contain nicotine  Talk to your healthcare provider before you use these products  · Limit or do not drink alcohol as directed  Alcohol can damage your heart and raise your blood pressure  Your healthcare provider may give you specific daily and weekly limits  The general recommended limit is 1 drink a day for women 21 or older and for men 72 or older  Do not have more than 3 drinks in a day or 7 in a week  The recommended limit is 2 drinks a day for men 24to 59years of age  Do not have more than 4 drinks in a day or 14 in a week  A drink of alcohol is 12 ounces of beer, 5 ounces of wine, or 1½ ounces of liquor  · Exercise regularly  Exercise can help you maintain a healthy weight and improve your blood pressure and cholesterol levels  Regular exercise can also decrease your risk for heart problems  Ask your healthcare provider about the best exercise plan for you  Do not start an exercise program without asking your healthcare provider  Follow up with your doctor or cardiologist as directed:  Write down your questions so you remember to ask them during your visits    © Copyright [employer] 2022 Information is for End User's use only and may not be sold, redistributed or otherwise used for commercial purposes  All illustrations and images included in CareNotes® are the copyrighted property of [employer] Inc  or Mickey Morrow  The above information is an  only  It is not intended as medical advice for individual conditions or treatments  Talk to your doctor, nurse or pharmacist before following any medical regimen to see if it is safe and effective for you

## 2023-02-21 ENCOUNTER — OFFICE VISIT (OUTPATIENT)
Dept: FAMILY MEDICINE CLINIC | Facility: CLINIC | Age: 48
End: 2023-02-21

## 2023-02-21 VITALS
DIASTOLIC BLOOD PRESSURE: 84 MMHG | OXYGEN SATURATION: 96 % | HEART RATE: 84 BPM | HEIGHT: 70 IN | WEIGHT: 208.2 LBS | SYSTOLIC BLOOD PRESSURE: 122 MMHG | TEMPERATURE: 97.4 F | BODY MASS INDEX: 29.81 KG/M2

## 2023-02-21 DIAGNOSIS — J30.1 NON-SEASONAL ALLERGIC RHINITIS DUE TO POLLEN: ICD-10-CM

## 2023-02-21 DIAGNOSIS — K21.9 GASTROESOPHAGEAL REFLUX DISEASE, UNSPECIFIED WHETHER ESOPHAGITIS PRESENT: Primary | ICD-10-CM

## 2023-02-21 NOTE — PROGRESS NOTES
Name: Alicia Berry      : 1975      MRN: [id number]  Encounter Provider: Trupti Whelan DO  Encounter Date: 2023   Encounter department: [address] [place] [address] Road   She is doing well  I encouraged her to continue to diet and exercise  I will see her back in September for her annual well visit  She will call us if she needs us before that visit  1  Gastroesophageal reflux disease, unspecified whether esophagitis present    2  Non-seasonal allergic rhinitis due to pollen    BMI Counseling: Body mass index is 29 87 kg/m²  The BMI is above normal  Nutrition recommendations include decreasing portion sizes, encouraging healthy choices of fruits and vegetables, decreasing fast food intake, consuming healthier snacks, limiting drinks that contain sugar, moderation in carbohydrate intake, increasing intake of lean protein, reducing intake of saturated and trans fat and reducing intake of cholesterol  Exercise recommendations include exercising 3-5 times per week  No pharmacotherapy was ordered  Rationale for BMI follow-up plan is due to patient being overweight or obese  Subjective     Patient here today for follow-up  Patient denies any chest pain or shortness of breath  She is feeling well  No longer taking her stomach medication  Her allergies have been controlled  She is starting to diet and exercise now  Review of Systems   Constitutional: Negative  Respiratory: Negative  Cardiovascular: Negative  Gastrointestinal: Negative  Genitourinary: Negative          Past Medical History:   Diagnosis Date   • Abnormal Pap smear of cervix    • Allergic rhinitis     Resolved 2017    • Anxiety    • Generalized anxiety disorder     Resolved 2017      Past Surgical History:   Procedure Laterality Date   • COLPOSCOPY     • NO PAST SURGERIES       Family History   Problem Relation Age of Onset   • Cancer Mother    • Colon cancer Mother    • Colon polyps Mother • Uterine cancer Mother    • Heart disease Father    • Colon cancer Maternal Grandmother    • Colon cancer Cousin      Social History     Socioeconomic History   • Marital status: /Civil Union     Spouse name: None   • Number of children: None   • Years of education: None   • Highest education level: None   Occupational History   • None   Tobacco Use   • Smoking status: Former   • Smokeless tobacco: Never   Vaping Use   • Vaping Use: Never used   Substance and Sexual Activity   • Alcohol use: Yes     Comment: occasional    • Drug use: Never   • Sexual activity: Yes     Partners: Male     Birth control/protection: Rhythm   Other Topics Concern   • None   Social History Narrative   • None     Social Determinants of Health     Financial Resource Strain: Not on file   Food Insecurity: Not on file   Transportation Needs: Not on file   Physical Activity: Not on file   Stress: Not on file   Social Connections: Not on file   Intimate Partner Violence: Not on file   Housing Stability: Not on file     Current Outpatient Medications on File Prior to Visit   Medication Sig   • fluticasone (FLONASE) 50 mcg/act nasal spray 2 sprays into each nostril daily   • Magnesium Oxide 400 MG CAPS Take 1 tablet 1 to 2 times daily with onset of menstrual symptoms   • EPINEPHrine (EPIPEN) 0 3 mg/0 3 mL SOAJ Inject 0 3 mL (0 3 mg total) into a muscle once for 1 dose PRN bee sting   • [DISCONTINUED] famotidine (PEPCID) 40 MG tablet Take 1 tablet (40 mg total) by mouth daily Take 1 tablet 2 hours prior to bed  • [DISCONTINUED] meclizine (ANTIVERT) 12 5 MG tablet Take 1 tablet (12 5 mg total) by mouth every 8 (eight) hours as needed for dizziness (Patient not taking: Reported on 2/21/2023)   • [DISCONTINUED] pyridoxine (VITAMIN B6) 100 mg tablet Start week before menses and week of menses  100 mg 1-3 times per day   (Patient not taking: Reported on 9/2/2022)     No Known Allergies  Immunization History   Administered Date(s) Administered   • COVID-19 MODERNA VACC 0 25 ML IM BOOSTER 02/16/2022   • COVID-19 MODERNA VACC 0 5 ML IM 12/24/2020, 01/25/2021   • COVID-19 Pfizer Vac BIVALENT Steven-sucrose 12 Yr+ IM (BOOSTER ONLY) 09/30/2022   • INFLUENZA 10/12/2018, 11/19/2019, 11/15/2022   • Influenza Quadrivalent Preservative Free 3 years and older IM 11/15/2016, 10/07/2017   • Influenza, injectable, quadrivalent, preservative free 0 5 mL 08/27/2021   • Influenza, seasonal, injectable 10/20/2014   • Tdap 11/08/2017       Objective     /84   Pulse 84   Temp (!) 97 4 °F (36 3 °C)   Ht 5' 10" (1 778 m)   Wt 94 4 kg (208 lb 3 2 oz)   SpO2 96%   BMI 29 87 kg/m²     Physical Exam  Vitals reviewed  Constitutional:       General: She is not in acute distress  Appearance: Normal appearance  She is well-developed  She is not ill-appearing, toxic-appearing or diaphoretic  HENT:      Head: Normocephalic and atraumatic  Eyes:      Conjunctiva/sclera: Conjunctivae normal    Cardiovascular:      Rate and Rhythm: Normal rate and regular rhythm  Heart sounds: Normal heart sounds  No murmur heard  No friction rub  No gallop  Pulmonary:      Effort: Pulmonary effort is normal  No respiratory distress  Breath sounds: Normal breath sounds  No wheezing, rhonchi or rales  Musculoskeletal:      Right lower leg: No edema  Left lower leg: No edema  Neurological:      General: No focal deficit present  Mental Status: She is alert and oriented to person, place, and time  Psychiatric:         Mood and Affect: Mood normal          Behavior: Behavior normal          Thought Content:  Thought content normal          Judgment: Judgment normal        Alvy Share, DO

## 2023-03-14 ENCOUNTER — TELEPHONE (OUTPATIENT)
Dept: FAMILY MEDICINE CLINIC | Facility: CLINIC | Age: 48
End: 2023-03-14

## 2023-03-14 DIAGNOSIS — R30.0 DYSURIA: Primary | ICD-10-CM

## 2023-03-14 RX ORDER — NITROFURANTOIN 25; 75 MG/1; MG/1
100 CAPSULE ORAL 2 TIMES DAILY
Qty: 10 CAPSULE | Refills: 0 | Status: SHIPPED | OUTPATIENT
Start: 2023-03-14 | End: 2023-03-19

## 2023-03-27 ENCOUNTER — TELEPHONE (OUTPATIENT)
Dept: FAMILY MEDICINE CLINIC | Facility: CLINIC | Age: 48
End: 2023-03-27

## 2023-03-27 ENCOUNTER — APPOINTMENT (OUTPATIENT)
Dept: LAB | Facility: CLINIC | Age: 48
End: 2023-03-27

## 2023-03-27 DIAGNOSIS — R30.0 DYSURIA: Primary | ICD-10-CM

## 2023-03-27 LAB

## 2023-03-27 RX ORDER — CIPROFLOXACIN 500 MG/1
500 TABLET, FILM COATED ORAL EVERY 12 HOURS SCHEDULED
Qty: 14 TABLET | Refills: 0 | Status: SHIPPED | OUTPATIENT
Start: 2023-03-27 | End: 2023-04-03

## 2023-03-27 NOTE — TELEPHONE ENCOUNTER
She actually was on Macrobid  I would like her to get a UA C&S done first   I will put in a prescription for Cipro for her to start after she does that    Call if symptoms continue or increase

## 2023-03-27 NOTE — TELEPHONE ENCOUNTER
Finished her Cipro that she started on 03/14/23  She is now having the same symptoms all over again  (Frequency/burning/odor)  Please advise

## 2023-03-28 LAB — BACTERIA UR CULT: ABNORMAL

## 2023-03-29 ENCOUNTER — TELEPHONE (OUTPATIENT)
Dept: FAMILY MEDICINE CLINIC | Facility: CLINIC | Age: 48
End: 2023-03-29

## 2023-03-29 DIAGNOSIS — R30.0 DYSURIA: Primary | ICD-10-CM

## 2023-03-29 RX ORDER — FLUCONAZOLE 150 MG/1
150 TABLET ORAL ONCE
Qty: 2 TABLET | Refills: 0 | Status: SHIPPED | OUTPATIENT
Start: 2023-03-29 | End: 2023-03-29

## 2023-03-29 NOTE — TELEPHONE ENCOUNTER
Pt aware and states she does not have any flank pain that is concerning, also has not ever had a kidney stone  She does feel like she is going more frequently and it is on the darker side  She did do a urine dip and it was negative today so she does feel like the antibiotic is helping  She is also wondering if it is maybe a yeast infection?

## 2023-03-29 NOTE — TELEPHONE ENCOUNTER
Pt still have a big extremely cloudy urine and a sensation of like a slight burning going on  She has taken the antibiotic that was prescribed for three days  She didn't know if you wanted her to follow up with anything else  Unsure of the potential of a kidney stone?

## 2023-03-29 NOTE — TELEPHONE ENCOUNTER
It certainly could be a yeast infection  I would have her continue and finish the Cipro  I will also put in an Rx for Diflucan    Have her call us if symptoms continue or increase

## 2023-04-01 ENCOUNTER — OFFICE VISIT (OUTPATIENT)
Dept: URGENT CARE | Facility: MEDICAL CENTER | Age: 48
End: 2023-04-01

## 2023-04-01 ENCOUNTER — APPOINTMENT (OUTPATIENT)
Dept: RADIOLOGY | Facility: MEDICAL CENTER | Age: 48
End: 2023-04-01

## 2023-04-01 VITALS
TEMPERATURE: 98.7 F | OXYGEN SATURATION: 99 % | SYSTOLIC BLOOD PRESSURE: 152 MMHG | DIASTOLIC BLOOD PRESSURE: 105 MMHG | HEART RATE: 94 BPM | RESPIRATION RATE: 20 BRPM

## 2023-04-01 DIAGNOSIS — S99.921A INJURY OF RIGHT FOOT, INITIAL ENCOUNTER: ICD-10-CM

## 2023-04-01 DIAGNOSIS — S90.31XA CONTUSION OF RIGHT FOOT, INITIAL ENCOUNTER: ICD-10-CM

## 2023-04-01 DIAGNOSIS — S93.601A SPRAIN OF RIGHT FOOT, INITIAL ENCOUNTER: Primary | ICD-10-CM

## 2023-04-01 NOTE — PATIENT INSTRUCTIONS
Tylenol/Ibuprofen for pain  Wear brace, splint or ACE wrap +/- crutches for support (Remove braces and ACE bandages every 3 hours)  Wear shoe arch support for foot injuries (ex: superfeet insoles)  Ice 20 minutes 3-4 times per day for 3 days  Insulate the skin from the ice to prevent frostbite  Rest and Elevate  Follow up with orthopedic   Follow up with PCP in 3-5 days  Proceed to  ER if symptoms worsen  Remove splint/brace and go straight to ER if you experience sudden increase in pain, swelling, change in color/temperature/sensation, chest pain, shortness or breath, or if you start coughing up blood

## 2023-04-01 NOTE — PROGRESS NOTES
[facility]        NAME: Dani Beltrán is a 52 y o  female  : 1975    MRN: [id number]  DATE: 2023  TIME: 7:49 PM    Assessment and Plan   Sprain of right foot, initial encounter [B76 [id number]]  1  Sprain of right foot, initial encounter  XR foot 3+ vw right    XR ankle 3+ vw right    Ambulatory referral to Orthopedic Surgery    Orthopedic injury treatment      2  Contusion of right foot, initial encounter            XR provider read: no acute fracture or dislocation  Declined CAM boot and pain medication  Patient is in significant pain  XR is normal  Made ortho appointment for pain  Recommended proceeding to the ER if pain does not improve with treatment or she develops any worsening symptoms  Patient Instructions     Tylenol/Ibuprofen for pain  Wear brace, splint or ACE wrap +/- crutches for support (Remove braces and ACE bandages every 3 hours)  Wear shoe arch support for foot injuries (ex: superfeet insoles)  Ice 20 minutes 3-4 times per day for 3 days  Insulate the skin from the ice to prevent frostbite  Rest and Elevate  Follow up with orthopedic   Follow up with PCP in 3-5 days  Proceed to  ER if symptoms worsen  Remove splint/brace and go straight to ER if you experience sudden increase in pain, swelling, change in color/temperature/sensation, chest pain, shortness or breath, or if you start coughing up blood  Chief Complaint     Chief Complaint   Patient presents with   • Fall     Down 1 step and fell on doorstop  Using ice and elevation  History of Present Illness       Ankle Pain   The incident occurred 1 to 3 hours ago  The injury mechanism was a fall (foot hit door jam)  Pain location: R foot and ankle  The pain is at a severity of 9/10  The pain has been worsening since onset  Associated symptoms include an inability to bear weight (patient was able to partially bear weight during the first hour before pain significantly worsened) and tingling  Pertinent negatives include no numbness  The symptoms are aggravated by movement, weight bearing and palpation  She has tried ice (compression; crutches) for the symptoms  Review of Systems   Review of Systems   Musculoskeletal: Negative for gait problem and joint swelling  Skin: Negative for color change  Neurological: Positive for tingling  Negative for weakness and numbness  Current Medications       Current Outpatient Medications:   •  ciprofloxacin (CIPRO) 500 mg tablet, Take 1 tablet (500 mg total) by mouth every 12 (twelve) hours for 7 days, Disp: 14 tablet, Rfl: 0  •  fluticasone (FLONASE) 50 mcg/act nasal spray, 2 sprays into each nostril daily, Disp: 16 g, Rfl: 3  •  Magnesium Oxide 400 MG CAPS, Take 1 tablet 1 to 2 times daily with onset of menstrual symptoms, Disp: , Rfl: 0  •  EPINEPHrine (EPIPEN) 0 3 mg/0 3 mL SOAJ, Inject 0 3 mL (0 3 mg total) into a muscle once for 1 dose PRN bee sting, Disp: 0 6 mL, Rfl: 0    Current Allergies     Allergies as of 04/01/2023   • (No Known Allergies)            The following portions of the patient's history were reviewed and updated as appropriate: allergies, current medications, past family history, past medical history, past social history, past surgical history and problem list      Past Medical History:   Diagnosis Date   • Abnormal Pap smear of cervix    • Allergic rhinitis     Resolved 7/14/2017    • Anxiety    • Generalized anxiety disorder     Resolved 7/14/2017        Past Surgical History:   Procedure Laterality Date   • COLPOSCOPY     • NO PAST SURGERIES         Family History   Problem Relation Age of Onset   • Cancer Mother    • Colon cancer Mother    • Colon polyps Mother    • Uterine cancer Mother    • Heart disease Father    • Colon cancer Maternal Grandmother    • Colon cancer Cousin          Medications have been verified          Objective   BP (!) 152/105   Pulse 94   Temp 98 7 °F (37 1 °C)   Resp 20   SpO2 99%   No LMP recorded  Physical Exam     Physical Exam  Vitals reviewed  Constitutional:       General: She is not in acute distress  Appearance: She is well-developed  Cardiovascular:      Rate and Rhythm: Normal rate and regular rhythm  Heart sounds: Normal heart sounds  No murmur heard  No friction rub  No gallop  Pulmonary:      Effort: Pulmonary effort is normal  No respiratory distress  Breath sounds: Normal breath sounds  No wheezing, rhonchi or rales  Musculoskeletal:         General: Swelling and tenderness present  No deformity  Comments: Unable to perform R ankle or foot ROM secondary to pain  R talus, navicular, cuneiforms, 1/2/5th metatarsals TTP  Skin:     General: Skin is warm  Capillary Refill: Capillary refill takes less than 2 seconds  Findings: No bruising or erythema  Neurological:      Mental Status: She is alert and oriented to person, place, and time  Sensory: No sensory deficit  Deep Tendon Reflexes: Reflexes are normal and symmetric  Psychiatric:         Behavior: Behavior normal          Thought Content: Thought content normal          Judgment: Judgment normal          Orthopedic injury treatment    Date/Time: 4/1/2023 7:47 PM  Performed by: Jac Holden PA-C  Authorized by: Jac Holden PA-C   Universal Protocol:  Consent: Verbal consent obtained  Risks and benefits: risks, benefits and alternatives were discussed  Consent given by: patient  Patient identity confirmed: verbally with patient      Injury location: R ankle/foot    Neurovascular status: Neurovascularly intact    Distal perfusion: normal    Neurological function: normal    Range of motion: normal    Immobilization:  Ace wrap  Supplies used:  Elastic bandage  Neurovascular status: Neurovascularly intact    Distal perfusion: normal    Neurological function: normal    Range of motion: unchanged    Patient tolerance:  Patient tolerated the procedure well with no immediate complications

## 2023-04-06 ENCOUNTER — OFFICE VISIT (OUTPATIENT)
Dept: OBGYN CLINIC | Facility: CLINIC | Age: 48
End: 2023-04-06

## 2023-04-06 VITALS
WEIGHT: 210 LBS | HEIGHT: 70 IN | HEART RATE: 83 BPM | SYSTOLIC BLOOD PRESSURE: 123 MMHG | DIASTOLIC BLOOD PRESSURE: 85 MMHG | BODY MASS INDEX: 30.06 KG/M2

## 2023-04-06 DIAGNOSIS — S93.601A SPRAIN OF RIGHT FOOT, INITIAL ENCOUNTER: ICD-10-CM

## 2023-04-06 DIAGNOSIS — S93.401A INVERSION SPRAIN OF RIGHT ANKLE, INITIAL ENCOUNTER: Primary | ICD-10-CM

## 2023-04-06 NOTE — PROGRESS NOTES
Assessment/Plan:   Diagnoses and all orders for this visit:    Inversion sprain of right ankle, initial encounter  -     Ambulatory Referral to Physical Therapy; Future  -     Ankle Cude ankle/Ankle Brace    Sprain of right foot, initial encounter  -     Ambulatory referral to Orthopedic Surgery  -     Ambulatory Referral to Physical Therapy; Future  -     Ankle Cude ankle/Ankle Brace       Reviewed physical exam and imaging with patient on today's visit  Her symptoms are consistent with a lateral ankle sprain of her right ankle  Referred to physical therapy to improve strength, range of motion, and pain  Provided with an ankle brace to support weight bearing activity  Continue weightbearing activity as tolerated  She will follow-up if her symptoms do not improve following physical therapy  The patient expresses understanding and is in agreement with today's treatment plan  The patient has a complex sprain of her right ankle  There is point tenderness along the anterior talofibular ligament  Treatment options were discussed  She is opted for conservative modalities with rehabilitation and bracing  Physical therapy will be started for range of motion, strengthening, proprioception  Return back in 6 weeks reevaluation  If her condition changes, she would not hesitate to let us know    Subjective:   Patient ID: Tammie Santana  1975     HPI  Patient is a 52 y o  female who presents for initial evaluation for her right ankle  The patient was seen in urgent care on 4/1/23, where she had x-rays completed  She states that earlier that day she fell down one stair, but landed onto a cast iron door stop  The patient stated that the pain was severe  She was thought she broke her ankle due to the level of pain, however, she was able to ambulate after the fall  On today's visit, she is still experiencing pain on the lateral portion of her ankle  She is ambulating without any assistive devices   She denies any numbness or tingling       The following portions of the patient's history were reviewed and updated as appropriate:  Past medical history, past surgical history, Family history, social history, current medications and allergies    Past Medical History:   Diagnosis Date   • Abnormal Pap smear of cervix    • Allergic rhinitis     Resolved 7/14/2017    • Anxiety    • Generalized anxiety disorder     Resolved 7/14/2017        Past Surgical History:   Procedure Laterality Date   • COLPOSCOPY     • NO PAST SURGERIES         Family History   Problem Relation Age of Onset   • Cancer Mother    • Colon cancer Mother    • Colon polyps Mother    • Uterine cancer Mother    • Heart disease Father    • Colon cancer Maternal Grandmother    • Colon cancer Cousin        Social History     Socioeconomic History   • Marital status: /Civil Union     Spouse name: None   • Number of children: None   • Years of education: None   • Highest education level: None   Occupational History   • None   Tobacco Use   • Smoking status: Former   • Smokeless tobacco: Never   Vaping Use   • Vaping Use: Never used   Substance and Sexual Activity   • Alcohol use: Yes     Comment: occasional    • Drug use: Never   • Sexual activity: Yes     Partners: Male     Birth control/protection: Rhythm   Other Topics Concern   • None   Social History Narrative   • None     Social Determinants of Health     Financial Resource Strain: Not on file   Food Insecurity: Not on file   Transportation Needs: Not on file   Physical Activity: Not on file   Stress: Not on file   Social Connections: Not on file   Intimate Partner Violence: Not on file   Housing Stability: Not on file         Current Outpatient Medications:   •  fluticasone (FLONASE) 50 mcg/act nasal spray, 2 sprays into each nostril daily, Disp: 16 g, Rfl: 3  •  Magnesium Oxide 400 MG CAPS, Take 1 tablet 1 to 2 times daily with onset of menstrual symptoms, Disp: , Rfl: 0  •  EPINEPHrine (EPIPEN) 0 3 mg/0 3 "mL SOAJ, Inject 0 3 mL (0 3 mg total) into a muscle once for 1 dose PRN bee sting, Disp: 0 6 mL, Rfl: 0    No Known Allergies    Review of Systems   Constitutional: Negative for chills, fever and unexpected weight change  HENT: Negative for hearing loss, nosebleeds and sore throat  Eyes: Negative for pain, redness and visual disturbance  Respiratory: Negative for cough, shortness of breath and wheezing  Cardiovascular: Negative for chest pain, palpitations and leg swelling  Gastrointestinal: Negative for abdominal pain, nausea and vomiting  Endocrine: Negative for polydipsia and polyuria  Genitourinary: Negative for dysuria and hematuria  Skin: Negative for rash and wound  Neurological: Negative for dizziness, numbness and headaches  Psychiatric/Behavioral: Negative for decreased concentration and suicidal ideas  The patient is not nervous/anxious  All other systems reviewed and are negative  Objective:  /85 (BP Location: Left arm, Patient Position: Sitting, Cuff Size: Large)   Pulse 83   Ht 5' 10\" (1 778 m)   Wt 95 3 kg (210 lb)   BMI 30 13 kg/m²     Ortho Exam  right ankle -   Patient ambulates with steady gait pattern  Uses No assistive device  No anatomical deformity  Skin is warm and dry to touch with no signs of erythema, ecchymosis, infection  Mild generalized soft tissue swelling or effusion noted  ROM: Within normal limits  Painful inversion and PF  TTP over ATF, AITF, and Talus  MMT: 5/5 throughout  + anterior drawer  + medial talar tilt, - lateral talar tilt  - syndesmotic squeeze  Calf compartments are soft and supple  2+ TP and DP pulses with brisk capillary refill to the toes  Sural, saphenous, tibial, superficial, and deep peroneal motor and sensory distributions intact  Sensation to light touch intact distally      Physical Exam  HENT:      Head: Normocephalic and atraumatic        Nose: Nose normal    Eyes:      Conjunctiva/sclera: Conjunctivae normal  " Cardiovascular:      Rate and Rhythm: Normal rate  Pulmonary:      Effort: Pulmonary effort is normal    Musculoskeletal:      Cervical back: Neck supple  Skin:     General: Skin is warm and dry  Capillary Refill: Capillary refill takes less than 2 seconds  Neurological:      Mental Status: She is alert and oriented to person, place, and time  Psychiatric:         Mood and Affect: Mood normal          Behavior: Behavior normal           Diagnostic Test Review: The attending physician has personally reviewed the pertinent films in PACS and the interpretation is as follows:    X-Ray of right ankle taken on 4/1/23 were reviewed and showed no acute osseous abnormalities  Procedures   None Performed       Scribe Attestation    I,:  Aicha Mir am acting as a scribe while in the presence of the attending physician :       I,:  Dimitri Monroe, DO personally performed the services described in this documentation    as scribed in my presence :

## 2023-04-06 NOTE — LETTER
April 7, 2023     [facility]    Patient: Yany Owens   YOB: 1975   Date of Visit: 4/6/2023       Dear Dr Destiny Aguliar: Thank you for referring Jacki Sorto to me for evaluation  Below are my notes for this consultation  If you have questions, please do not hesitate to call me  I look forward to following your patient along with you  Sincerely,        Ruddy Mancini DO        CC: No Recipients  Ruddy Mancini DO  4/6/2023  9:25 AM  Signed  Assessment/Plan:   Diagnoses and all orders for this visit:    Inversion sprain of right ankle, initial encounter  -     Ambulatory Referral to Physical Therapy; Future  -     Ankle Cude ankle/Ankle Brace    Sprain of right foot, initial encounter  -     Ambulatory referral to Orthopedic Surgery  -     Ambulatory Referral to Physical Therapy; Future  -     Ankle Cude ankle/Ankle Brace       Reviewed physical exam and imaging with patient on today's visit  Her symptoms are consistent with a lateral ankle sprain of her right ankle  Referred to physical therapy to improve strength, range of motion, and pain  Provided with an ankle brace to support weight bearing activity  Continue weightbearing activity as tolerated  She will follow-up if her symptoms do not improve following physical therapy  The patient expresses understanding and is in agreement with today's treatment plan  The patient has a complex sprain of her right ankle  There is point tenderness along the anterior talofibular ligament  Treatment options were discussed  She is opted for conservative modalities with rehabilitation and bracing  Physical therapy will be started for range of motion, strengthening, proprioception  Return back in 6 weeks reevaluation    If her condition changes, she would not hesitate to let us know    Subjective:   Patient ID: Yany Owens  1975     HPI  Patient is a 52 y o  female who presents for initial evaluation for her right ankle  The patient was seen in urgent care on 4/1/23, where she had x-rays completed  She states that earlier that day she fell down one stair, but landed onto a cast iron door stop  The patient stated that the pain was severe  She was thought she broke her ankle due to the level of pain, however, she was able to ambulate after the fall  On today's visit, she is still experiencing pain on the lateral portion of her ankle  She is ambulating without any assistive devices  She denies any numbness or tingling       The following portions of the patient's history were reviewed and updated as appropriate:  Past medical history, past surgical history, Family history, social history, current medications and allergies    Past Medical History:   Diagnosis Date   • Abnormal Pap smear of cervix    • Allergic rhinitis     Resolved 7/14/2017    • Anxiety    • Generalized anxiety disorder     Resolved 7/14/2017        Past Surgical History:   Procedure Laterality Date   • COLPOSCOPY     • NO PAST SURGERIES         Family History   Problem Relation Age of Onset   • Cancer Mother    • Colon cancer Mother    • Colon polyps Mother    • Uterine cancer Mother    • Heart disease Father    • Colon cancer Maternal Grandmother    • Colon cancer Cousin        Social History     Socioeconomic History   • Marital status: /Civil Union     Spouse name: None   • Number of children: None   • Years of education: None   • Highest education level: None   Occupational History   • None   Tobacco Use   • Smoking status: Former   • Smokeless tobacco: Never   Vaping Use   • Vaping Use: Never used   Substance and Sexual Activity   • Alcohol use: Yes     Comment: occasional    • Drug use: Never   • Sexual activity: Yes     Partners: Male     Birth control/protection: Rhythm   Other Topics Concern   • None   Social History Narrative   • None     Social Determinants of Health     Financial Resource Strain: Not on "file   Food Insecurity: Not on file   Transportation Needs: Not on file   Physical Activity: Not on file   Stress: Not on file   Social Connections: Not on file   Intimate Partner Violence: Not on file   Housing Stability: Not on file         Current Outpatient Medications:   •  fluticasone (FLONASE) 50 mcg/act nasal spray, 2 sprays into each nostril daily, Disp: 16 g, Rfl: 3  •  Magnesium Oxide 400 MG CAPS, Take 1 tablet 1 to 2 times daily with onset of menstrual symptoms, Disp: , Rfl: 0  •  EPINEPHrine (EPIPEN) 0 3 mg/0 3 mL SOAJ, Inject 0 3 mL (0 3 mg total) into a muscle once for 1 dose PRN bee sting, Disp: 0 6 mL, Rfl: 0    No Known Allergies    Review of Systems   Constitutional: Negative for chills, fever and unexpected weight change  HENT: Negative for hearing loss, nosebleeds and sore throat  Eyes: Negative for pain, redness and visual disturbance  Respiratory: Negative for cough, shortness of breath and wheezing  Cardiovascular: Negative for chest pain, palpitations and leg swelling  Gastrointestinal: Negative for abdominal pain, nausea and vomiting  Endocrine: Negative for polydipsia and polyuria  Genitourinary: Negative for dysuria and hematuria  Skin: Negative for rash and wound  Neurological: Negative for dizziness, numbness and headaches  Psychiatric/Behavioral: Negative for decreased concentration and suicidal ideas  The patient is not nervous/anxious  All other systems reviewed and are negative  Objective:  /85 (BP Location: Left arm, Patient Position: Sitting, Cuff Size: Large)   Pulse 83   Ht 5' 10\" (1 778 m)   Wt 95 3 kg (210 lb)   BMI 30 13 kg/m²     Ortho Exam  right ankle -   Patient ambulates with steady gait pattern  Uses No assistive device  No anatomical deformity  Skin is warm and dry to touch with no signs of erythema, ecchymosis, infection  Mild generalized soft tissue swelling or effusion noted  ROM: Within normal limits   Painful inversion and " PF    TTP over ATF, AITF, and Talus  MMT: 5/5 throughout  + anterior drawer  + medial talar tilt, - lateral talar tilt  - syndesmotic squeeze  Calf compartments are soft and supple  2+ TP and DP pulses with brisk capillary refill to the toes  Sural, saphenous, tibial, superficial, and deep peroneal motor and sensory distributions intact  Sensation to light touch intact distally      Physical Exam  HENT:      Head: Normocephalic and atraumatic  Nose: Nose normal    Eyes:      Conjunctiva/sclera: Conjunctivae normal    Cardiovascular:      Rate and Rhythm: Normal rate  Pulmonary:      Effort: Pulmonary effort is normal    Musculoskeletal:      Cervical back: Neck supple  Skin:     General: Skin is warm and dry  Capillary Refill: Capillary refill takes less than 2 seconds  Neurological:      Mental Status: She is alert and oriented to person, place, and time  Psychiatric:         Mood and Affect: Mood normal          Behavior: Behavior normal           Diagnostic Test Review: The attending physician has personally reviewed the pertinent films in PACS and the interpretation is as follows:    X-Ray of right ankle taken on 4/1/23 were reviewed and showed no acute osseous abnormalities  Procedures   None Performed       Scribe Attestation    I,:  Aicha Mir am acting as a scribe while in the presence of the attending physician :       I,:  Nuha Rico, DO personally performed the services described in this documentation    as scribed in my presence :

## 2023-04-24 ENCOUNTER — APPOINTMENT (OUTPATIENT)
Dept: PHYSICAL THERAPY | Facility: CLINIC | Age: 48
End: 2023-04-24

## 2023-04-25 ENCOUNTER — OFFICE VISIT (OUTPATIENT)
Dept: PHYSICAL THERAPY | Facility: CLINIC | Age: 48
End: 2023-04-25

## 2023-04-25 DIAGNOSIS — S93.401D SPRAIN OF LIGAMENT OF RIGHT ANKLE, SUBSEQUENT ENCOUNTER: Primary | ICD-10-CM

## 2023-04-25 DIAGNOSIS — S93.601D SPRAIN OF RIGHT FOOT, SUBSEQUENT ENCOUNTER: ICD-10-CM

## 2023-04-26 ENCOUNTER — APPOINTMENT (OUTPATIENT)
Dept: PHYSICAL THERAPY | Facility: CLINIC | Age: 48
End: 2023-04-26

## 2023-04-28 ENCOUNTER — OFFICE VISIT (OUTPATIENT)
Dept: PHYSICAL THERAPY | Facility: CLINIC | Age: 48
End: 2023-04-28

## 2023-04-28 DIAGNOSIS — S93.601D SPRAIN OF RIGHT FOOT, SUBSEQUENT ENCOUNTER: Primary | ICD-10-CM

## 2023-04-28 DIAGNOSIS — S93.401D SPRAIN OF LIGAMENT OF RIGHT ANKLE, SUBSEQUENT ENCOUNTER: ICD-10-CM

## 2023-04-28 NOTE — PROGRESS NOTES
"Daily Note     Today's date: 2023  Patient name: Tab Gambino  : 1975  MRN: [id number]  Referring provider: Farhan Vaughan DO  Dx:   Encounter Diagnosis     ICD-10-CM    1  Sprain of right foot, subsequent encounter  S93 601D       2  Sprain of ligament of right ankle, subsequent encounter  S93 401D           Start Time: 0700  Stop Time: 0810  Total time in clinic (min): 70 minutes    Subjective: My ankle is feeling pretty good  I dont have much pain today  Objective: See treatment diary below      Assessment: Tolerated treatment well  Patient would benefit from continued PT  We began some new exercises today to help strengthen her right ankle  She reports some mild soreness over her lateral , anterior ankle as well as her lateral right foot  Pt reports most soreness with standing heel raises  She was tender over her lateral ankle and lateral foot  Pt had some very mild swelling   She had some mild soreness post , we ended with a cold pack to her right foot in supine for  10 min  Plan: Continue per plan of care  Precautions: Hx of fall     Date    FOTO        Manuals        Grade 3 AP joint mobilization of talocrural joint     5 min 5 min NP np   Passive forefoot and mid foot stretching 5 min 5 min 5 min 5 min ds   STM to plantar foot 5 min 5 min 5 min 5 min Ds+ tfm lateral           Neuro Re-Ed        SLS 5 x 15 \" foam  15\" 5x 15 \" 5 x  5x 15\" foam   Seated HR/TR w/ weighted ball Standing heel / toe raise  3# 20x 3 #  20 x 3# 20x   Tandem walking 5 x airex   4 x  5x foam fwd/bwd   Side stepping  5 x airex                               Ther Ex        Toe curls 5\" 20 x 5\" 15x 5\" 15x 5 \" 15 x  20x 5\"   Ankle arom 20 x  2 #  15x  20x  20x all   10mAnkle isometrics  5\" 10x 5\" 10x 5 \" 15 x  15x 5\"   Ankle MREs 30 x  10x ea 15 x  15x    Bike for mobility and strengthening  Nustep  8 min L 5  lvl 2 5 min L 2 5 min 8m L4   Leg press        Step ups 8\"  20 x    " Ther Activity                Gait Training                Modalities        CP  10 min 10 min 10 min 10 min 10m

## 2023-05-01 ENCOUNTER — OFFICE VISIT (OUTPATIENT)
Dept: PHYSICAL THERAPY | Facility: CLINIC | Age: 48
End: 2023-05-01

## 2023-05-01 DIAGNOSIS — S93.601D SPRAIN OF RIGHT FOOT, SUBSEQUENT ENCOUNTER: ICD-10-CM

## 2023-05-01 DIAGNOSIS — S93.401D SPRAIN OF LIGAMENT OF RIGHT ANKLE, SUBSEQUENT ENCOUNTER: Primary | ICD-10-CM

## 2023-05-01 NOTE — PROGRESS NOTES
"Daily Note     Today's date: 2023  Patient name: Adriane Mattson  : 1975  MRN: [id number]  Referring provider: Josué Apple DO  Dx:   Encounter Diagnosis     ICD-10-CM    1  Sprain of ligament of right ankle, subsequent encounter  S93 401D       2  Sprain of right foot, subsequent encounter  S93 601D           Start Time: 1635  Stop Time: 1746  Total time in clinic (min): 71 minutes    Subjective: I rested my ankle over the weekend  , I have some aching over the outside of my foot and the front of my ankle  Objective: See treatment diary below      Assessment: Tolerated treatment well  Patient would benefit from continued PT  Pt completed her program today with some very mild soreness  She is showing improvement with MRE's to her right ankle today with little discomfort  She had some very mild swelling today  Pain still noted over her lateral right foot and anterior right ankle  Pt had a cold pack post to 10 min to her right ankle  Plan: Continue per plan of care  Precautions: Hx of fall     Date    FOTO        Manuals        Grade 3 AP joint mobilization of talocrural joint      5 min NP np   Passive forefoot and mid foot stretching 5 min 5 min 5 min 5 min ds   STM to plantar foot 5 min  5 min 5 min Ds+ tfm lateral           Neuro Re-Ed        SLS 5 x 15 \" foam 5 x 15 \"  foam 15\" 5x 15 \" 5 x  5x 15\" foam   Seated HR/TR w/ weighted ball Standing heel / toe raise Standing HR  TR 30 x 3# 20x 3 #  20 x 3# 20x   Tandem walking 5 x airex 5 x airex  4 x  5x foam fwd/bwd   Side stepping  5 x airex 5 x airex                               Ther Ex        Toe curls 5\" 20 x 5\" 15x 5\" 15x 5 \" 15 x  20x 5\"   Ankle arom 20 x  2 #   30 x 2#   20x  20x all   10mAnkle isometrics   5\" 10x 5 \" 15 x  15x 5\"   Ankle MREs 30 x 30 x 10x ea 15 x  15x    Bike for mobility and strengthening  Nustep  8 min L 5 Nustep 8 min L 5 lvl 2 5 min L 2 5 min 8m L4   Leg press  50 # 30x     " "  Step ups 8\"  20 x 8 \" 30 x      Ther Activity                Gait Training                Modalities        CP  10 min 10 min 10 min 10 min 10m                              "

## 2023-05-04 ENCOUNTER — OFFICE VISIT (OUTPATIENT)
Dept: PHYSICAL THERAPY | Facility: CLINIC | Age: 48
End: 2023-05-04

## 2023-05-04 DIAGNOSIS — S93.401D SPRAIN OF LIGAMENT OF RIGHT ANKLE, SUBSEQUENT ENCOUNTER: Primary | ICD-10-CM

## 2023-05-04 DIAGNOSIS — S93.601D SPRAIN OF RIGHT FOOT, SUBSEQUENT ENCOUNTER: ICD-10-CM

## 2023-05-04 NOTE — PROGRESS NOTES
"Daily Note     Today's date: 2023  Patient name: Marcos Casey  : 1975  MRN: [id number]  Referring provider: Leonides Dancer, DO  Dx:   Encounter Diagnosis     ICD-10-CM    1  Sprain of ligament of right ankle, subsequent encounter  S93 401D       2  Sprain of right foot, subsequent encounter  S93 601D           Start Time: 1638  Stop Time: 1755  Total time in clinic (min): 77 minutes     Subjective: Patient reports that she had some increased swelling and soreness to the R dorsal foot and ankle after her last therapy session  She feels that overall she is doing better and is walking with less pain  She is able to walk on even surfaces without pain and feels that occasionally when she is on uneven surfaces she will notice increased discomfort with prolonged activity  Objective: See treatment diary below      Assessment: Tolerated treatment well  Patient demonstrated fatigue post treatment and would benefit from continued PT Patient continues to show good progress regarding strength and mobility of the R ankle  She still has some weakness noted with eversion during MREs  Plan: Continue per plan of care  Progress note during next visit  Progress treatment as tolerated  Precautions: Hx of fall     Date    FOTO        Manuals        Grade 3 AP joint mobilization of talocrural joint       NP np   Passive forefoot and mid foot stretching 5 min 5 min 5 min 5 min ds   STM to plantar foot 5 min  5 min 5 min Ds+ tfm lateral           Neuro Re-Ed        SLS 5 x 15 \" foam 5 x 15 \"  foam 20\" 5x airex 15 \" 5 x  5x 15\" foam   Seated HR/TR w/ weighted ball Standing heel / toe raise Standing HR  TR 30 x Standing w/ ball 30x ea 3 #  20 x 3# 20x   Tandem walking 5 x airex 5 x airex 5 laps airex 4 x  5x foam fwd/bwd   Side stepping  5 x airex 5 x airex  5 laps airex                             Ther Ex        Toe curls 5\" 20 x 5\" 15x  5 \" 15 x  20x 5\"   Ankle arom 20 x  2 #   " "30 x 2#   20x  20x all   Ankle isometrics    5 \" 15 x  15x 5\"   Ankle MREs 30 x 30 x 30x 15 x  15x    Bike for mobility and strengthening  Nustep  8 min L 5 Nustep 8 min L 5 Bike 8 min L5 L 2 5 min 8m L4   Leg press  50 # 30x  70# 2x15     Step ups 8\"  20 x 8 \" 30 x BOSU 30x     Ther Activity                Gait Training                Modalities        CP  10 min 10 min 10 min 10 min 10m                                "

## 2023-05-08 ENCOUNTER — EVALUATION (OUTPATIENT)
Dept: PHYSICAL THERAPY | Facility: CLINIC | Age: 48
End: 2023-05-08

## 2023-05-08 DIAGNOSIS — S93.601D SPRAIN OF RIGHT FOOT, SUBSEQUENT ENCOUNTER: ICD-10-CM

## 2023-05-08 DIAGNOSIS — S93.401D SPRAIN OF LIGAMENT OF RIGHT ANKLE, SUBSEQUENT ENCOUNTER: Primary | ICD-10-CM

## 2023-05-08 NOTE — PROGRESS NOTES
"PT Re-Evaluation     Today's date: 2023  Patient name: Jasmin Garibay  : 1975  MRN: [id number]  Referring provider: Crow Paris DO   Dx:   Encounter Diagnosis     ICD-10-CM    1  Sprain of ligament of right ankle, subsequent encounter  S93 401D       2  Sprain of right foot, subsequent encounter  S93 601D           Start Time: 1700  Stop Time: 1800  Total time in clinic (min): 60 minutes    Assessment  Assessment details: Patient has attended 9 physical therapy visits to date  She is demonstrating improved mobility and strength of the R ankle along with an improved gait pattern  She has the greatest limitation with R ankle eversion strength at this time  There continues to good tolerance and response to therapy interventions which are focusing on addressing the functional limitations accompanied by the lack of full strength to the R LE  Impairments: abnormal gait, abnormal or restricted ROM, activity intolerance, impaired balance, impaired physical strength, lacks appropriate home exercise program and pain with function    Symptom irritability: moderateUnderstanding of Dx/Px/POC: good   Prognosis: good    Goals  STGs:  \"Patient will be independent with hep by 2-3 visits  - MET  Improve ankle rom to wnl for improved tolerance with ambulation and adls by 2-4 weeks  - MET  Decrease ankle pain by 50% for improved tolerance with ambulation and adls by 2-4 weeks  \" - MET    LTGs:  \"Improve FOTO score from 57 to 80 indicating improved tolerance with activities involving the LE by discharge  - Progressing  Improve ankle rom and strength to wnl for improved tolerance with adls, ambulation, and work duties by discharge  - Progressing  Patient will be able to ambulate over community distances without pain or deviation by discharge  - MET  Patient will be able to return to prior level of work by discharge   \" - Progressing      Plan  Plan details: Patient informed that from this point forward, to ensure " adherence to the aforementioned plan of care, all or some of the treatment may be performed and carried out by a Physical Therapy Assistant (PTA) with supervision from a licensed Physical Therapist (PT) in accordance with [place] [address]  Patient will continue to benefit from skilled physical therapy to address the functional deficits that were identified during the evaluation today  We will continue to progress the therapy program to address these functional deficits and achieve the established goals  Patient would benefit from: skilled physical therapy  Planned modality interventions: cryotherapy  Planned therapy interventions: balance, functional ROM exercises, gait training, home exercise program, therapeutic exercise, therapeutic activities, stretching, strengthening, manual therapy and neuromuscular re-education  Frequency: 1-2x week  Duration in weeks: 4  Plan of Care beginning date: 5/8/2023  Plan of Care expiration date: 6/5/2023  Treatment plan discussed with: patient        Subjective Evaluation    History of Present Illness  Date of onset: 4/1/2023  Mechanism of injury: trauma  Mechanism of injury: Patient presents to out patient physical therapy with chief c/o R ankle pain  Patient reports injuring her foot when walking casey the stairs and falling onto the R ankle/foot which came in contact with a castiron doorstop after she missed the last step in her house while carrying a laundry basket and bowl  She was seen at the [facility] where x-rays were negative for fracture  She follow up with Ortho who provided her with an ankle brace and instructed her to attend physical therapy  Update 5/8/2023:  Patient reports that she has seen good improvements regarding the pain in her ankle  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 but she is able to walk around on the uneven ground with mild limitation  "She feels that the pain and swelling has improved and that her ankle is getting stronger  Quality of life: good    Pain  Current pain ratin  At best pain ratin  At worst pain ratin  Quality: discomfort and throbbing  Relieving factors: medications, rest, support and ice  Aggravating factors: stair climbing, walking and standing    Social Support  Stairs in house: yes   Lives in: multiple-level home  Lives with: spouse    Employment status: working  Patient Goals  Patient goals for therapy: decreased pain, increased motion, increased strength, independence with ADLs/IADLs, return to sport/leisure activities and return to work  Patient goal: 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 and also return to normal ambulation without limitaiton  Objective     Active Range of Motion   Left Ankle/Foot   Dorsiflexion (kf): 16 degrees   Plantar flexion: 65 degrees   Inversion: 31 degrees   Eversion: 23 degrees   Great toe flexion: 46 degrees   Great toe extension: 55 degrees     Right Ankle/Foot   Dorsiflexion (kf): 14 degrees   Plantar flexion: 68 degrees   Inversion: 32 degrees   Eversion: 18 degrees   Great toe flexion: 39 degrees   Great toe extension: 62 degrees     Strength/Myotome Testing     Right Ankle/Foot   Dorsiflexion: 4+  Plantar flexion: 5  Inversion: 5  Eversion: 4-             Precautions: Hx of fall     Date  reassess     FOTO    72 SC    Manuals        Grade 3 AP joint mobilization of talocrural joint        np   Passive forefoot and mid foot stretching 5 min 5 min 5 min  ds   STM to plantar foot 5 min  5 min  Ds+ tfm lateral           Neuro Re-Ed        SLS 5 x 15 \" foam 5 x 15 \"  foam 20\" 5x airex 15\" 5x 5x 15\" foam   Seated HR/TR w/ weighted ball Standing heel / toe raise Standing HR  TR 30 x Standing w/ ball 30x ea Standing w/ ball 30x 3# 20x   Tandem walking 5 x airex 5 x airex 5 laps airex 5 laps 5x foam " "fwd/bwd   Side stepping  5 x airex 5 x airex  5 laps airex 5 laps                            Ther Ex        Toe curls 5\" 20 x 5\" 15x   20x 5\"   Ankle arom 20 x  2 #   30 x 2#   2# 30x eversion only 20x all   Ankle isometrics     15x 5\"   Ankle MREs 30 x 30 x 30x  15x    Bike for mobility and strengthening  Nustep  8 min L 5 Nustep 8 min L 5 Bike 8 min L5 Bike 8 min L5 8m L4   Leg press  50 # 30x  70# 2x15 70# 2x15    Step ups 8\"  20 x 8 \" 30 x BOSU 30x BOSU 30x    Ther Activity                Gait Training                Modalities        CP  10 min 10 min 10 min  10m                  "

## 2023-05-08 NOTE — LETTER
May 8, 2023    Ulysses Cleaning DO  [address]    Patient: Desean Chapin   YOB: 1975   Date of Visit: 2023     Encounter Diagnosis     ICD-10-CM    1  Sprain of ligament of right ankle, subsequent encounter  S93 401D       2  Sprain of right foot, subsequent encounter  S93 601D           Dear Dr Gisselle Dooley:    Thank you for your recent referral of Desean Chapin  Please review the attached evaluation summary from Heather's recent visit  Please verify that you agree with the plan of care by signing the attached order  If you have any questions or concerns, please do not hesitate to call  I sincerely appreciate the opportunity to share in the care of one of your patients and hope to have another opportunity to work with you in the near future  Sincerely,    Ben Bui, PT      Referring Provider:      I certify that I have read the below Plan of Care and certify the need for these services furnished under this plan of treatment while under my care  Ulysses Cleaning DO   [address] [address]  [place]          PT Re-Evaluation     Today's date: 2023  Patient name: Desean Chapin  : 1975  MRN: [id number]  Referring provider: Kavita Kelly DO   Dx:   Encounter Diagnosis     ICD-10-CM    1  Sprain of ligament of right ankle, subsequent encounter  S93 401D       2  Sprain of right foot, subsequent encounter  S93 601D           Start Time: 1700  Stop Time: 1800  Total time in clinic (min): 60 minutes    Assessment  Assessment details: Patient has attended 9 physical therapy visits to date  She is demonstrating improved mobility and strength of the R ankle along with an improved gait pattern  She has the greatest limitation with R ankle eversion strength at this time   There continues to good tolerance and response to therapy interventions which are focusing on addressing the "functional limitations accompanied by the lack of full strength to the R LE  Impairments: abnormal gait, abnormal or restricted ROM, activity intolerance, impaired balance, impaired physical strength, lacks appropriate home exercise program and pain with function    Symptom irritability: moderateUnderstanding of Dx/Px/POC: good   Prognosis: good    Goals  STGs:  \"Patient will be independent with hep by 2-3 visits  - MET  Improve ankle rom to wnl for improved tolerance with ambulation and adls by 2-4 weeks  - MET  Decrease ankle pain by 50% for improved tolerance with ambulation and adls by 2-4 weeks  \" - MET    LTGs:  \"Improve FOTO score from 57 to 80 indicating improved tolerance with activities involving the LE by discharge  - Progressing  Improve ankle rom and strength to wnl for improved tolerance with adls, ambulation, and work duties by discharge  - Progressing  Patient will be able to ambulate over community distances without pain or deviation by discharge  - MET  Patient will be able to return to prior level of work by discharge  \" - Progressing      Plan  Plan details: Patient informed that from this point forward, to ensure adherence to the aforementioned plan of care, all or some of the treatment may be performed and carried out by a Physical Therapy Assistant (PTA) with supervision from a licensed Physical Therapist (PT) in accordance with [address]  Patient will continue to benefit from skilled physical therapy to address the functional deficits that were identified during the evaluation today  We will continue to progress the therapy program to address these functional deficits and achieve the established goals            Patient would benefit from: skilled physical therapy  Planned modality interventions: cryotherapy  Planned therapy interventions: balance, functional ROM exercises, gait training, home exercise program, therapeutic exercise, therapeutic " activities, stretching, strengthening, manual therapy and neuromuscular re-education  Frequency: 1-2x week  Duration in weeks: 4  Plan of Care beginning date: 2023  Plan of Care expiration date: 2023  Treatment plan discussed with: patient        Subjective Evaluation    History of Present Illness  Date of onset: 2023  Mechanism of injury: trauma  Mechanism of injury: Patient presents to out patient physical therapy with chief c/o R ankle pain  Patient reports injuring her foot when walking casey the stairs and falling onto the R ankle/foot which came in contact with a castiron doorstop after she missed the last step in her house while carrying a laundry basket and bowl  She was seen at the [facility] where x-rays were negative for fracture  She follow up with Ortho who provided her with an ankle brace and instructed her to attend physical therapy  Update 2023:  Patient reports that she has seen good improvements regarding the pain in her ankle  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 but she is able to walk around on the uneven ground with mild limitation  She feels that the pain and swelling has improved and that her ankle is getting stronger     Quality of life: good    Pain  Current pain ratin  At best pain ratin  At worst pain ratin  Quality: discomfort and throbbing  Relieving factors: medications, rest, support and ice  Aggravating factors: stair climbing, walking and standing    Social Support  Stairs in house: yes   Lives in: multiple-level home  Lives with: spouse    Employment status: working  Patient Goals  Patient goals for therapy: decreased pain, increased motion, increased strength, independence with ADLs/IADLs, return to sport/leisure activities and return to work  Patient goal: 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 and also return to normal "ambulation without limitaiton  Objective     Active Range of Motion   Left Ankle/Foot   Dorsiflexion (kf): 16 degrees   Plantar flexion: 65 degrees   Inversion: 31 degrees   Eversion: 23 degrees   Great toe flexion: 46 degrees   Great toe extension: 55 degrees     Right Ankle/Foot   Dorsiflexion (kf): 14 degrees   Plantar flexion: 68 degrees   Inversion: 32 degrees   Eversion: 18 degrees   Great toe flexion: 39 degrees   Great toe extension: 62 degrees     Strength/Myotome Testing     Right Ankle/Foot   Dorsiflexion: 4+  Plantar flexion: 5  Inversion: 5  Eversion: 4-            Precautions: Hx of fall 4/1    Date 4/28 5/1 5/4 5/8 reassess  4/25   FOTO    72 SC    Manuals        Grade 3 AP joint mobilization of talocrural joint        np   Passive forefoot and mid foot stretching 5 min 5 min 5 min  ds   STM to plantar foot 5 min  5 min  Ds+ tfm lateral           Neuro Re-Ed        SLS 5 x 15 \" foam 5 x 15 \"  foam 20\" 5x airex 15\" 5x 5x 15\" foam   Seated HR/TR w/ weighted ball Standing heel / toe raise Standing HR  TR 30 x Standing w/ ball 30x ea Standing w/ ball 30x 3# 20x   Tandem walking 5 x airex 5 x airex 5 laps airex 5 laps 5x foam fwd/bwd   Side stepping  5 x airex 5 x airex  5 laps airex 5 laps                            Ther Ex        Toe curls 5\" 20 x 5\" 15x   20x 5\"   Ankle arom 20 x  2 #   30 x 2#   2# 30x eversion only 20x all   Ankle isometrics     15x 5\"   Ankle MREs 30 x 30 x 30x  15x    Bike for mobility and strengthening  Nustep  8 min L 5 Nustep 8 min L 5 Bike 8 min L5 Bike 8 min L5 8m L4   Leg press  50 # 30x  70# 2x15 70# 2x15    Step ups 8\"  20 x 8 \" 30 x BOSU 30x BOSU 30x    Ther Activity                Gait Training                Modalities        CP  10 min 10 min 10 min  10m                                 "

## 2023-05-12 ENCOUNTER — OFFICE VISIT (OUTPATIENT)
Dept: PHYSICAL THERAPY | Facility: CLINIC | Age: 48
End: 2023-05-12

## 2023-05-12 DIAGNOSIS — S93.601D SPRAIN OF RIGHT FOOT, SUBSEQUENT ENCOUNTER: Primary | ICD-10-CM

## 2023-05-12 DIAGNOSIS — S93.401D SPRAIN OF LIGAMENT OF RIGHT ANKLE, SUBSEQUENT ENCOUNTER: ICD-10-CM

## 2023-05-12 NOTE — PROGRESS NOTES
"Daily Note     Today's date: 2023  Patient name: Flash Trejo  : 1975  MRN: [id number]  Referring provider: Bhavani Fonseca DO  Dx:   Encounter Diagnosis     ICD-10-CM    1  Sprain of right foot, subsequent encounter  S93 601D       2  Sprain of ligament of right ankle, subsequent encounter  S93 401D           Start Time: 07  Stop Time: 08  Total time in clinic (min): 65 minutes    Subjective: My ankle is feeling better  I get some pain at times over the side of my foot and over the front of my ankle  Objective: See treatment diary below      Assessment: Tolerated treatment well  Patient would benefit from continued PT   Pt did well with her exercises today with only some very mild discomfort over her lateral right foot  She had no pain today over the front of her ankle  She reports feeling improvement over all and feels improved ankle strength  Pt declines ice post      Plan: Continue per plan of care  Precautions: Hx of fall     Date  reassess     FOTO    72 SC    Manuals        Grade 3 AP joint mobilization of talocrural joint        np   Passive forefoot and mid foot stretching 5 min 5 min 5 min  JF   STM to plantar foot 5 min  5 min             Neuro Re-Ed        SLS 5 x 15 \" foam 5 x 15 \"  foam 20\" 5x airex 15\" 5x 5x 15\" foam   Seated HR/TR w/ weighted ball Standing heel / toe raise Standing HR  TR 30 x Standing w/ ball 30x ea Standing w/ ball 30x Standing with ball 30x   Tandem walking 5 x airex 5 x airex 5 laps airex 5 laps 5x foam fwd/bwd   Side stepping  5 x airex 5 x airex  5 laps airex 5 laps 5 laps                           Ther Ex        Toe curls 5\" 20 x 5\" 15x      Ankle arom 20 x  2 #   30 x 2#   2# 30x eversion only 2 #  30x eversion   Ankle isometrics        Ankle MREs 30 x 30 x 30x  20x    Bike for mobility and strengthening  Nustep  8 min L 5 Nustep 8 min L 5 Bike 8 min L5 Bike 8 min L5 8m L4   Leg press  50 # 30x  70# 2x15 70# 2x15 70 #   " "2 x 20    Step ups 8\"  20 x 8 \" 30 x BOSU 30x BOSU 30x bosu 30 x   Ther Activity                Gait Training                Modalities        CP  10 min 10 min 10 min  10m                    "

## 2023-05-17 ENCOUNTER — OFFICE VISIT (OUTPATIENT)
Dept: PHYSICAL THERAPY | Facility: CLINIC | Age: 48
End: 2023-05-17

## 2023-05-17 DIAGNOSIS — S93.401D SPRAIN OF LIGAMENT OF RIGHT ANKLE, SUBSEQUENT ENCOUNTER: Primary | ICD-10-CM

## 2023-05-17 DIAGNOSIS — S93.601D SPRAIN OF RIGHT FOOT, SUBSEQUENT ENCOUNTER: ICD-10-CM

## 2023-05-17 NOTE — PROGRESS NOTES
"Daily Note     Today's date: 2023  Patient name: Dominick Leija  : 1975  MRN: [id number]  Referring provider: Megan Thomas DO  Dx:   Encounter Diagnosis     ICD-10-CM    1  Sprain of ligament of right ankle, subsequent encounter  S93 401D       2  Sprain of right foot, subsequent encounter  S93 601D           Start Time: 1708  Stop Time: 1800  Total time in clinic (min): 52 minutes    Subjective: I twisted my ankle over the weekend and it hurt some , but, it feels ok now  Objective: See treatment diary below      Assessment: Tolerated treatment well  Patient would benefit from continued PT  Pt reports feeling a strain in her ankle with the standing exercises today  She states that her ankle is feeling better over all  Pt did well with ankle MRE today with some mild soreness  She reports having very little swelling of late  She had no pain post        Plan: Continue per plan of care  Precautions: Hx of fall     Date  reassess     FOTO    72 SC    Manuals        Grade 3 AP joint mobilization of talocrural joint        np   Passive forefoot and mid foot stretching  5 min 5 min  JF   STM to plantar foot   5 min             Neuro Re-Ed        SLS 5 x 15 \" foam 5 x 15 \"  foam 20\" 5x airex 15\" 5x 5x 15\" foam   Seated HR/TR w/ weighted ball Standing heel / toe raise Standing HR  TR 30 x Standing w/ ball 30x ea Standing w/ ball 30x Standing with ball 30x   Tandem walking 5 x airex 5 x airex 5 laps airex 5 laps 5x foam fwd/bwd   Side stepping  5 x airex 5 x airex  5 laps airex 5 laps 5 laps                           Ther Ex        Toe curls  5\" 15x      Ankle arom 30  x  2 #   30 x 2#   2# 30x eversion only 2 #  30x eversion   Ankle isometrics        Ankle MREs 30 x 30 x 30x  20x    Bike for mobility and strengthening  Nustep  8 min L 5 Nustep 8 min L 5 Bike 8 min L5 Bike 8 min L5 8m L4   Leg press 80 # 2 x 20 50 # 30x  70# 2x15 70# 2x15 70 #   2 x 20    Step ups 8\"  20 " "x 8 \" 30 x BOSU 30x BOSU 30x bosu 30 x   Ther Activity                Gait Training                Modalities        CP   10 min 10 min  10m                      "

## 2023-05-24 ENCOUNTER — OFFICE VISIT (OUTPATIENT)
Dept: PHYSICAL THERAPY | Facility: CLINIC | Age: 48
End: 2023-05-24

## 2023-05-24 DIAGNOSIS — S93.401D SPRAIN OF LIGAMENT OF RIGHT ANKLE, SUBSEQUENT ENCOUNTER: ICD-10-CM

## 2023-05-24 DIAGNOSIS — S93.601D SPRAIN OF RIGHT FOOT, SUBSEQUENT ENCOUNTER: Primary | ICD-10-CM

## 2023-05-24 NOTE — PROGRESS NOTES
"Daily Note     Today's date: 2023  Patient name: Lan Gray  : 1975  MRN: [id number]  Referring provider: Melissa Shahid DO  Dx:   Encounter Diagnosis     ICD-10-CM    1  Sprain of right foot, subsequent encounter  S93 601D       2  Sprain of ligament of right ankle, subsequent encounter  S93 401D           Start Time: 1630  Stop Time: 1735  Total time in clinic (min): 65 minutes    Subjective:  My ankle feels ok  I have some soreness over the front of my ankle  Objective: See treatment diary below      Assessment: Tolerated treatment well  Patient would benefit from continued PT   PT completed all exercises with very little pain  She did reports some burning in her shin and ankle with MRE s  pt did well with standing exercises today  , we added the elliptical today for 3 min  We ended with ice for 10 min post to her right ankle  Plan: Continue per plan of care  Precautions: Hx of fall     Date  reassess     FOTO    72 SC    Manuals        Grade 3 AP joint mobilization of talocrural joint        np   Passive forefoot and mid foot stretching   5 min  JF   STM to plantar foot   5 min             Neuro Re-Ed        SLS 5 x 15 \" foam 5 x 15 \"  foam 20\" 5x airex 15\" 5x 5x 15\" foam   Seated HR/TR w/ weighted ball Standing heel / toe raise Standing HR  TR 30 x Standing w/ ball 30x ea Standing w/ ball 30x Standing with ball 30x   Tandem walking 5 x airex 5 x airex 5 laps airex 5 laps 5x foam fwd/bwd   Side stepping  5 x airex 5 x airex  5 laps airex 5 laps 5 laps                           Ther Ex        Toe curls        Ankle arom 30  x  2 #   30 x 2#   2# 30x eversion only 2 #  30x eversion   Ankle isometrics        Ankle MREs 30 x 30 x 30x  20x    Bike for mobility and strengthening  Nustep  8 min L 5  bike  8 min L 5 Bike 8 min L5 Bike 8 min L5 8m L4   Leg press 80 # 2 x 20 80  #  2 x 20  70# 2x15 70# 2x15 70 #   2 x 20    Step ups 8\"  20 x BOSU  30 x BOSU 30x " BOSU 30x bosu 30 x   Ther Activity                Gait Training                Modalities        CP   10 min 10 min  10m

## 2023-06-01 ENCOUNTER — OFFICE VISIT (OUTPATIENT)
Dept: FAMILY MEDICINE CLINIC | Facility: CLINIC | Age: 48
End: 2023-06-01

## 2023-06-01 ENCOUNTER — OFFICE VISIT (OUTPATIENT)
Dept: PHYSICAL THERAPY | Facility: CLINIC | Age: 48
End: 2023-06-01

## 2023-06-01 VITALS
OXYGEN SATURATION: 99 % | HEART RATE: 79 BPM | HEIGHT: 70 IN | BODY MASS INDEX: 30.58 KG/M2 | SYSTOLIC BLOOD PRESSURE: 134 MMHG | TEMPERATURE: 97.6 F | WEIGHT: 213.6 LBS | DIASTOLIC BLOOD PRESSURE: 86 MMHG

## 2023-06-01 DIAGNOSIS — J06.9 UPPER RESPIRATORY TRACT INFECTION, UNSPECIFIED TYPE: Primary | ICD-10-CM

## 2023-06-01 DIAGNOSIS — S93.401D SPRAIN OF LIGAMENT OF RIGHT ANKLE, SUBSEQUENT ENCOUNTER: Primary | ICD-10-CM

## 2023-06-01 DIAGNOSIS — J30.1 NON-SEASONAL ALLERGIC RHINITIS DUE TO POLLEN: ICD-10-CM

## 2023-06-01 DIAGNOSIS — S93.601D SPRAIN OF RIGHT FOOT, SUBSEQUENT ENCOUNTER: ICD-10-CM

## 2023-06-01 RX ORDER — FLUTICASONE PROPIONATE 50 MCG
2 SPRAY, SUSPENSION (ML) NASAL DAILY
Qty: 16 G | Refills: 3 | Status: SHIPPED | OUTPATIENT
Start: 2023-06-01

## 2023-06-01 RX ORDER — AZITHROMYCIN 250 MG/1
TABLET, FILM COATED ORAL
Qty: 6 TABLET | Refills: 0 | Status: SHIPPED | OUTPATIENT
Start: 2023-06-01 | End: 2023-06-05

## 2023-06-01 RX ORDER — FLUTICASONE PROPIONATE 50 MCG
2 SPRAY, SUSPENSION (ML) NASAL DAILY
Qty: 16 G | Refills: 3 | Status: SHIPPED | OUTPATIENT
Start: 2023-06-01 | End: 2023-06-01

## 2023-06-01 RX ORDER — PREDNISONE 10 MG/1
TABLET ORAL
Qty: 30 TABLET | Refills: 0 | Status: SHIPPED | OUTPATIENT
Start: 2023-06-01

## 2023-06-01 RX ORDER — ALBUTEROL SULFATE 90 UG/1
2 AEROSOL, METERED RESPIRATORY (INHALATION) EVERY 6 HOURS PRN
Qty: 18 G | Refills: 0 | Status: SHIPPED | OUTPATIENT
Start: 2023-06-01

## 2023-06-01 NOTE — PROGRESS NOTES
"Daily Note     Today's date: 2023  Patient name: Rico Nixon  : 1975  MRN: [id number]  Referring provider: Ngozi Stanley DO  Dx:   Encounter Diagnosis     ICD-10-CM    1  Sprain of ligament of right ankle, subsequent encounter  S93 401D       2  Sprain of right foot, subsequent encounter  S93 601D           Start Time: 1700  Stop Time: 1800  Total time in clinic (min): 60 minutes    Subjective:  I think today will be my last day  My ankle is feeling pretty good  I can still get pains in my ankle at times  Objective: See treatment diary below      Assessment: Tolerated treatment well  Patient would benefit from continued PT  Pt will be released to a home program today  She did well with her exercises today with no pain stated, but, had some fatigue with MRE  We reviewed her home program today  She had good strength with all motions today  Plan: Continue per plan of care  D/C to home program     Precautions: Hx of fall     Date  reassess     FOTO    72 SC    Manuals        Grade 3 AP joint mobilization of talocrural joint        np   Passive forefoot and mid foot stretching   5 min  JF   STM to plantar foot   5 min             Neuro Re-Ed        SLS 5 x 15 \" foam 5 x 15 \"  foam 20\" 5x airex 15\" 5x 5x 15\" foam   Seated HR/TR w/ weighted ball Standing heel / toe raise Standing HR  TR 30 x Standing w/ ball 30x ea Standing w/ ball 30x Standing with ball 30x   Tandem walking 5 x airex 5 x airex 5 laps airex 5 laps 5x foam fwd/bwd   Side stepping  5 x airex 5 x airex  5 laps airex 5 laps 5 laps                           Ther Ex        Toe curls        Ankle arom 30  x  2 #   30 x 2#  30 x 2# 2# 30x eversion only 2 #  30x eversion   Ankle isometrics        Ankle MREs 30 x 30 x 30x  20x    Bike for mobility and strengthening  Nustep  8 min L 5  bike  8 min L 5 Bike 8 min L5 Bike 8 min L5 8m L4   Leg press 80 # 2 x 20 80  #  2 x 20  80# 2x20 70# 2x15 70 #   2 x 20    Step " "ups 8\"  20 x BOSU  30 x BOSU 30x BOSU 30x bosu 30 x   Ther Activity                Gait Training                Modalities        CP   10 min 10 min  10m                          "

## 2023-06-01 NOTE — PROGRESS NOTES
Name: Eleuterio Goodpasture      : 1975      MRN: [id number]  Encounter Provider: Joseph Murphy DO  Encounter Date: 2023   Encounter department: [address] [place] [address]   Rx put in for a Z-Butch, prednisone taper, Flonase and albuterol  She will get Allegra and Mucinex over-the-counter  Push fluids  Call if symptoms continue or increase  1  Upper respiratory tract infection, unspecified type    2  Non-seasonal allergic rhinitis due to pollen  -     azithromycin (ZITHROMAX) 250 mg tablet; Take 2 tablets today then 1 tablet daily x 4 days  -     predniSONE 10 mg tablet; 4 tablets daily for 3 days, then 3 tablets daily for 3 days, then 2 tablets daily for 3 days, then 1 tablet daily for 3 days  -     fluticasone (FLONASE) 50 mcg/act nasal spray; 2 sprays into each nostril daily  -     albuterol (PROVENTIL HFA,VENTOLIN HFA) 90 mcg/act inhaler; Inhale 2 puffs every 6 (six) hours as needed for wheezing           Subjective     Patient here today stating for the last week or so has had chest congestion, productive cough, sinus pressure  Has been feeling off balance because of the ear pressure  No fever or chills  Cough    Nausea  Associated symptoms include congestion, coughing and nausea  Review of Systems   HENT: Positive for congestion and sinus pressure  Respiratory: Positive for cough  Cardiovascular: Negative  Gastrointestinal: Positive for nausea  Genitourinary: Negative          Past Medical History:   Diagnosis Date   • Abnormal Pap smear of cervix    • Allergic rhinitis     Resolved 2017    • Anxiety    • Generalized anxiety disorder     Resolved 2017      Past Surgical History:   Procedure Laterality Date   • COLPOSCOPY     • NO PAST SURGERIES       Family History   Problem Relation Age of Onset   • Cancer Mother    • Colon cancer Mother    • Colon polyps Mother    • Uterine cancer Mother    • Heart disease Father    • Colon cancer Maternal Grandmother • Colon cancer Cousin      Social History     Socioeconomic History   • Marital status: /Civil Union     Spouse name: None   • Number of children: None   • Years of education: None   • Highest education level: None   Occupational History   • None   Tobacco Use   • Smoking status: Former   • Smokeless tobacco: Never   Vaping Use   • Vaping Use: Never used   Substance and Sexual Activity   • Alcohol use: Yes     Comment: occasional    • Drug use: Never   • Sexual activity: Yes     Partners: Male     Birth control/protection: Rhythm   Other Topics Concern   • None   Social History Narrative   • None     Social Determinants of Health     Financial Resource Strain: Not on file   Food Insecurity: Not on file   Transportation Needs: Not on file   Physical Activity: Not on file   Stress: Not on file   Social Connections: Not on file   Intimate Partner Violence: Not on file   Housing Stability: Not on file     Current Outpatient Medications on File Prior to Visit   Medication Sig   • Magnesium Oxide 400 MG CAPS Take 1 tablet 1 to 2 times daily with onset of menstrual symptoms   • [DISCONTINUED] fluticasone (FLONASE) 50 mcg/act nasal spray 2 sprays into each nostril daily   • EPINEPHrine (EPIPEN) 0 3 mg/0 3 mL SOAJ Inject 0 3 mL (0 3 mg total) into a muscle once for 1 dose PRN bee sting     No Known Allergies  Immunization History   Administered Date(s) Administered   • COVID-19 MODERNA VACC 0 25 ML IM BOOSTER 02/16/2022   • COVID-19 MODERNA VACC 0 5 ML IM 12/24/2020, 01/25/2021   • COVID-19 Pfizer Vac BIVALENT Steven-sucrose 12 Yr+ IM (BOOSTER ONLY) 09/30/2022   • INFLUENZA 10/12/2018, 11/19/2019, 11/15/2022   • Influenza Quadrivalent Preservative Free 3 years and older IM 11/15/2016, 10/07/2017   • Influenza, injectable, quadrivalent, preservative free 0 5 mL 08/27/2021   • Influenza, seasonal, injectable 10/20/2014   • MMR 01/28/2019, 05/05/2023   • Tdap 11/08/2017       Objective     /86   Pulse 79   Temp "97 6 °F (36 4 °C)   Ht 5' 10\" (1 778 m)   Wt 96 9 kg (213 lb 9 6 oz)   SpO2 99%   BMI 30 65 kg/m²     Physical Exam  Vitals reviewed  Constitutional:       General: She is not in acute distress  Appearance: Normal appearance  She is well-developed  She is not ill-appearing, toxic-appearing or diaphoretic  HENT:      Head: Normocephalic and atraumatic  Right Ear: Tympanic membrane is bulging  Tympanic membrane is not erythematous  Left Ear: Tympanic membrane is bulging  Tympanic membrane is not erythematous  Eyes:      Conjunctiva/sclera: Conjunctivae normal    Cardiovascular:      Rate and Rhythm: Normal rate and regular rhythm  Heart sounds: Normal heart sounds  No murmur heard  No friction rub  No gallop  Pulmonary:      Effort: Pulmonary effort is normal  No respiratory distress  Breath sounds: Normal breath sounds  No wheezing, rhonchi or rales  Musculoskeletal:      Right lower leg: No edema  Left lower leg: No edema  Neurological:      General: No focal deficit present  Mental Status: She is alert and oriented to person, place, and time  Psychiatric:         Mood and Affect: Mood normal          Behavior: Behavior normal          Thought Content:  Thought content normal          Judgment: Judgment normal        Maria G Sober, DO  "

## 2023-06-02 NOTE — PROGRESS NOTES
Patient reports minimal difficulties at this time and has reached a functional plateau with therapy interventions  She will continue with a home program at this time

## 2023-06-07 ENCOUNTER — APPOINTMENT (OUTPATIENT)
Dept: PHYSICAL THERAPY | Facility: CLINIC | Age: 48
End: 2023-06-07
Payer: COMMERCIAL

## 2023-06-14 ENCOUNTER — APPOINTMENT (OUTPATIENT)
Dept: PHYSICAL THERAPY | Facility: CLINIC | Age: 48
End: 2023-06-14
Payer: COMMERCIAL

## 2023-06-22 NOTE — PROGRESS NOTES
[facility] [address] [place] [address]    ASSESSMENT/PLAN: Phyllis Mendoza is a 52 y o  [id number] who presents for annual gynecologic exam     Encounter for routine gynecologic examination  - Routine well woman exam completed today  - Cervical Cancer Screening: Current ASCCP Guidelines reviewed  Last Pap: 2021  Next Pap Due: today  - HPV Vaccination status: Not immunized  - STI screening offered including HIV: not indicated based on hx or requested at time of visit  - Contraceptive counseling discussed  Current contraception: NFP   - Breast Cancer Screening: Last Mammogram Not on file, script given  - RTO 1 yr    Additional problems addressed at this visit:  1  Routine gynecological examination  -     Liquid-based pap, screening  -     Mammo screening bilateral w 3d & cad; Future; Expected date: 06/23/2023    2  Encounter for screening mammogram for breast cancer  -     Mammo screening bilateral w 3d & cad; Future; Expected date: 06/23/2023    3  Vaginal discharge  -     Chlamydia/GC amplified DNA by PCR    4  Pelvic pain  -     US pelvis complete w transvaginal; Future; Expected date: 06/23/2023    5  Vulvovaginal candidiasis  -     fluconazole (DIFLUCAN) 150 mg tablet; Take 1 tablet (150 mg total) by mouth once for 1 dose  -     clotrimazole-betamethasone (LOTRISONE) 1-0 05 % cream; Apply topically 2 (two) times a day          CC:  Annual Gynecologic Examination    HPI: Phyllis Mendoza is a 52 y o  [id number] who presents for annual gynecologic examination  Oracio Graham presents for gyn exam today  6/16/2023 LMP  Menses is around 21 days, flow lasts 4-5 days last menses was heavy and occasionally cramping intensifies  Headaches have improved  Using NFP as birth control method  Happy with method  Last pap smear - 2021- normal  Hx of abnormal pap smear-yes  Sexually active- yes, pain does not intensify  Desires GC/Chlam STI testing    Will schedule mammogram  Colonoscopy - followed by  "[facility]  7-8 hrs sleep per day  1-2 servings of calcium rich food per day  Active on farm daily  1 servings of caffeine per day  Performs SBE monthly  Safe at home- yes  Wears seatbelt - yes  Concerns : plans on doing genetic testing due to colon polyp and family history  Notes that she has a dull ache lower pelvis for the past few months- cannot associate with ovulation or onset of menses  No vaginal odor, thicker discharge and itching vulvar area  The following portions of the patient's history were reviewed and updated as appropriate: allergies, current medications, past family history, past medical history, obstetric history, gynecologic history, past social history, past surgical history and problem list     Review of Systems   Constitutional: Negative for chills, fatigue and fever  Respiratory: Positive for cough  Negative for shortness of breath  Cardiovascular: Negative for chest pain, palpitations and leg swelling  Gastrointestinal: Negative for constipation and diarrhea  Genitourinary: Positive for pelvic pain and vaginal discharge  Negative for difficulty urinating, dysuria, frequency, menstrual problem, vaginal bleeding and vaginal pain  Neurological: Negative for light-headedness and headaches  Psychiatric/Behavioral: The patient is not nervous/anxious  Objective:  /74   Ht 5' 10\" (1 778 m)   Wt 96 3 kg (212 lb 6 4 oz)   LMP 06/16/2023   BMI 30 48 kg/m²    Physical Exam  Vitals reviewed  Constitutional:       Appearance: Normal appearance  HENT:      Head: Normocephalic  Neck:      Thyroid: No thyroid mass or thyroid tenderness  Cardiovascular:      Rate and Rhythm: Normal rate and regular rhythm  Heart sounds: Normal heart sounds  Pulmonary:      Effort: Pulmonary effort is normal       Breath sounds: Normal breath sounds  Chest:   Breasts:     Right: No mass, nipple discharge, skin change or tenderness        Left: No mass, nipple discharge, " skin change or tenderness  Abdominal:      General: There is no distension  Palpations: There is no mass  Tenderness: There is no abdominal tenderness  There is no guarding  Genitourinary:     General: Normal vulva  Exam position: Lithotomy position  Labia:         Right: No tenderness or lesion  Left: No tenderness or lesion  Vagina: No vaginal discharge, tenderness, bleeding or lesions  Cervix: No discharge, lesion, erythema or cervical bleeding  Uterus: Normal  Not enlarged and not tender  Adnexa:         Right: No mass, tenderness or fullness  Left: No mass, tenderness or fullness  Comments: Negative whiff test, ph 4 0  Musculoskeletal:      Cervical back: Normal range of motion  Lymphadenopathy:      Upper Body:      Right upper body: No axillary adenopathy  Left upper body: No axillary adenopathy  Skin:     General: Skin is warm and dry  Neurological:      Mental Status: She is alert     Psychiatric:         Mood and Affect: Mood normal          Behavior: Behavior normal          Judgment: Judgment normal              Getachew Martínez CNM  [facility]  06/23/23 9:46 AM

## 2023-06-23 ENCOUNTER — ANNUAL EXAM (OUTPATIENT)
Dept: OBGYN CLINIC | Facility: CLINIC | Age: 48
End: 2023-06-23
Payer: COMMERCIAL

## 2023-06-23 VITALS
WEIGHT: 212.4 LBS | SYSTOLIC BLOOD PRESSURE: 122 MMHG | HEIGHT: 70 IN | BODY MASS INDEX: 30.41 KG/M2 | DIASTOLIC BLOOD PRESSURE: 74 MMHG

## 2023-06-23 DIAGNOSIS — R10.2 PELVIC PAIN: ICD-10-CM

## 2023-06-23 DIAGNOSIS — Z01.419 ROUTINE GYNECOLOGICAL EXAMINATION: Primary | ICD-10-CM

## 2023-06-23 DIAGNOSIS — N89.8 VAGINAL DISCHARGE: ICD-10-CM

## 2023-06-23 DIAGNOSIS — B37.31 VULVOVAGINAL CANDIDIASIS: ICD-10-CM

## 2023-06-23 DIAGNOSIS — Z12.31 ENCOUNTER FOR SCREENING MAMMOGRAM FOR BREAST CANCER: ICD-10-CM

## 2023-06-23 PROCEDURE — S0612 ANNUAL GYNECOLOGICAL EXAMINA: HCPCS | Performed by: ADVANCED PRACTICE MIDWIFE

## 2023-06-23 PROCEDURE — 87491 CHLMYD TRACH DNA AMP PROBE: CPT | Performed by: ADVANCED PRACTICE MIDWIFE

## 2023-06-23 PROCEDURE — G0145 SCR C/V CYTO,THINLAYER,RESCR: HCPCS | Performed by: PATHOLOGY

## 2023-06-23 PROCEDURE — 87591 N.GONORRHOEAE DNA AMP PROB: CPT | Performed by: ADVANCED PRACTICE MIDWIFE

## 2023-06-23 RX ORDER — FLUCONAZOLE 150 MG/1
150 TABLET ORAL ONCE
Qty: 1 TABLET | Refills: 0 | Status: SHIPPED | OUTPATIENT
Start: 2023-06-23 | End: 2023-06-23

## 2023-06-23 RX ORDER — CLOTRIMAZOLE AND BETAMETHASONE DIPROPIONATE 10; .64 MG/G; MG/G
CREAM TOPICAL 2 TIMES DAILY
Qty: 45 G | Refills: 1 | Status: SHIPPED | OUTPATIENT
Start: 2023-06-23

## 2023-06-27 LAB
C TRACH DNA SPEC QL NAA+PROBE: NEGATIVE
N GONORRHOEA DNA SPEC QL NAA+PROBE: NEGATIVE

## 2023-07-03 LAB
HPV HR 12 DNA CVX QL NAA+PROBE: NEGATIVE
HPV16 DNA CVX QL NAA+PROBE: NEGATIVE
HPV18 DNA CVX QL NAA+PROBE: NEGATIVE
LAB AP GYN PRIMARY INTERPRETATION: ABNORMAL
Lab: ABNORMAL
PATH INTERP SPEC-IMP: ABNORMAL

## 2023-07-12 ENCOUNTER — HOSPITAL ENCOUNTER (OUTPATIENT)
Dept: ULTRASOUND IMAGING | Facility: HOSPITAL | Age: 48
Discharge: HOME | End: 2023-07-12
Payer: COMMERCIAL

## 2023-07-12 DIAGNOSIS — R10.2 PELVIC PAIN: ICD-10-CM

## 2023-07-12 PROCEDURE — 76830 TRANSVAGINAL US NON-OB: CPT

## 2023-07-12 PROCEDURE — 76856 US EXAM PELVIC COMPLETE: CPT

## 2023-07-25 ENCOUNTER — TELEPHONE (OUTPATIENT)
Dept: FAMILY MEDICINE CLINIC | Facility: CLINIC | Age: 48
End: 2023-07-25
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
## 2023-07-25 RX ORDER — TRIAMCINOLONE ACETONIDE 1 MG/G
CREAM TOPICAL 2 TIMES DAILY
Qty: 30 G | Refills: 0 | Status: SHIPPED | OUTPATIENT
Start: 2023-07-25

## 2023-07-25 RX ORDER — PREDNISONE 10 MG/1
TABLET ORAL
Qty: 14 TABLET | Refills: 0 | Status: SHIPPED | OUTPATIENT
Start: 2023-07-25

## 2023-07-25 NOTE — TELEPHONE ENCOUNTER
Please call patient  Informed today by her  that she got bit by horse flies in her arms bilaterally. I will put in for prednisone taper and for triamcinolone cream to apply.   Call if symptoms continue or increase

## 2023-07-25 NOTE — TELEPHONE ENCOUNTER
----- Message from Maki Brown sent at 7/25/2023  9:12 AM EDT -----  Regarding: FW: Bug bite/sting  Contact: [phone]    ----- Message -----  From: Maritza Maldoando  Sent: 7/25/2023   9:10 AM EDT  To: [facility]  Subject: Bug bite/sting                                   Good morning Doctor. I got stung/ bit Sunday night and it is spreading down my arm very far. It is also hot to the touch. I take Benadryl at night and have been putting cream on during the day.

## 2023-08-09 ENCOUNTER — DOCUMENTATION (OUTPATIENT)
Dept: FAMILY MEDICINE CLINIC | Facility: CLINIC | Age: 48
End: 2023-08-09

## 2023-08-09 ENCOUNTER — TELEPHONE (OUTPATIENT)
Dept: FAMILY MEDICINE CLINIC | Facility: CLINIC | Age: 48
End: 2023-08-09

## 2023-08-09 DIAGNOSIS — Z87.898 HISTORY OF MOTION SICKNESS: Primary | ICD-10-CM

## 2023-08-09 RX ORDER — SCOLOPAMINE TRANSDERMAL SYSTEM 1 MG/1
1 PATCH, EXTENDED RELEASE TRANSDERMAL
Qty: 5 PATCH | Refills: 0 | Status: SHIPPED | OUTPATIENT
Start: 2023-08-09

## 2023-08-09 NOTE — TELEPHONE ENCOUNTER
I'm going on vacation next week and I was wondering if doctor can prescribe me a scopolamine couple patches that'd be appreciated. I hope all have a great day. And my number is to [phone]. Thank you.

## 2023-08-15 ENCOUNTER — APPOINTMENT (OUTPATIENT)
Dept: LAB | Facility: MEDICAL CENTER | Age: 48
End: 2023-08-15
Payer: COMMERCIAL

## 2023-08-15 DIAGNOSIS — Z00.8 HEALTH EXAMINATION IN POPULATION SURVEY: Primary | ICD-10-CM

## 2023-08-15 LAB
CHOLEST SERPL-MCNC: 172 MG/DL
EST. AVERAGE GLUCOSE BLD GHB EST-MCNC: 128 MG/DL
HBA1C MFR BLD: 6.1 %
HDLC SERPL-MCNC: 37 MG/DL
LDLC SERPL CALC-MCNC: 99 MG/DL (ref 0–100)
NONHDLC SERPL-MCNC: 135 MG/DL
TRIGL SERPL-MCNC: 181 MG/DL

## 2023-08-15 PROCEDURE — 36415 COLL VENOUS BLD VENIPUNCTURE: CPT

## 2023-08-15 PROCEDURE — 83036 HEMOGLOBIN GLYCOSYLATED A1C: CPT

## 2023-08-15 PROCEDURE — 80061 LIPID PANEL: CPT

## 2023-09-13 ENCOUNTER — OFFICE VISIT (OUTPATIENT)
Dept: FAMILY MEDICINE CLINIC | Facility: CLINIC | Age: 48
End: 2023-09-13
Payer: COMMERCIAL

## 2023-09-13 VITALS
SYSTOLIC BLOOD PRESSURE: 114 MMHG | BODY MASS INDEX: 30.35 KG/M2 | TEMPERATURE: 98 F | DIASTOLIC BLOOD PRESSURE: 80 MMHG | HEART RATE: 74 BPM | WEIGHT: 212 LBS | HEIGHT: 70 IN | OXYGEN SATURATION: 98 %

## 2023-09-13 DIAGNOSIS — M54.50 LEFT LOW BACK PAIN, UNSPECIFIED CHRONICITY, UNSPECIFIED WHETHER SCIATICA PRESENT: ICD-10-CM

## 2023-09-13 DIAGNOSIS — Z00.00 ANNUAL PHYSICAL EXAM: Primary | ICD-10-CM

## 2023-09-13 PROCEDURE — 99396 PREV VISIT EST AGE 40-64: CPT | Performed by: FAMILY MEDICINE

## 2023-09-13 RX ORDER — IBUPROFEN 600 MG/1
600 TABLET ORAL EVERY 8 HOURS PRN
Qty: 30 TABLET | Refills: 0 | Status: SHIPPED | OUTPATIENT
Start: 2023-09-13

## 2023-09-13 NOTE — PATIENT INSTRUCTIONS
Heart Healthy Diet   AMBULATORY CARE:   A heart healthy diet  is an eating plan low in unhealthy fats and sodium (salt). The plan is high in healthy fats and fiber. A heart healthy diet helps improve your cholesterol levels and lowers your risk for heart disease and stroke. A dietitian will teach you how to read and understand food labels. Heart healthy diet guidelines to follow:   • Choose foods that contain healthy fats:      ? Unsaturated fats  include monounsaturated and polyunsaturated fats. Unsaturated fat is found in foods such as soybean, canola, olive, corn, and safflower oils. It is also found in soft tub margarine that is made with liquid vegetable oil. ? Omega-3 fat  is found in certain fish, such as salmon, tuna, and trout, and in walnuts and flaxseed. Eat fish high in omega-3 fats at least 2 times a week. • Limit or do not have unhealthy fats:      ? Cholesterol  is found in animal foods, such as eggs and lobster, and in dairy products made from whole milk. Limit cholesterol to less than 200 mg each day. ? Saturated fat  is found in meats, such as dawson and hamburger. It is also found in chicken or turkey skin, whole milk, and butter. Limit saturated fat to less than 7% of your total daily calories. ? Trans fat  is found in packaged foods, such as potato chips and cookies. It is also in hard margarine, some fried foods, and shortening. Do not eat foods that contain trans fats. • Get 20 to 30 grams of fiber each day. Fruits, vegetables, whole-grain foods, and legumes (cooked beans) are good sources of fiber. • Limit sodium as directed. You may be told to limit sodium, such as to 2,000 mg or less each day. Choose low-sodium or no-salt-added foods. Add little or no salt to food you prepare. Use herbs and spices in place of salt.        Include the following in your heart healthy plan:  Ask your dietitian or healthcare provider how many servings to have each day from the following food groups:  • Grains:      ? Whole-wheat breads, cereals, and pastas, and brown rice    ? Low-fat, low-sodium crackers and chips    • Vegetables:      ? Broccoli, green beans, green peas, and spinach    ? Collards, kale, and lima beans    ? Carrots, sweet potatoes, tomatoes, and peppers    ? Canned vegetables with no salt added    • Fruits:      ? Bananas, peaches, pears, and pineapple    ? Grapes, raisins, and dates    ? Oranges, tangerines, grapefruit, orange juice, and grapefruit juice    ? Apricots, mangoes, melons, and papaya    ? Raspberries and strawberries    ? Canned fruit with no added sugar    • Low-fat dairy:      ? Nonfat (skim) milk, 1% milk, and low-fat almond, cashew, or soy milks fortified with calcium    ? Low-fat cheese, regular or frozen yogurt, and cottage cheese    • Meats and proteins:      ? Lean cuts of beef and pork (loin, leg, round), skinless chicken and turkey    ? Legumes, soy products, egg whites, or nuts    Limit or do not include the following in your heart healthy plan:   • Foods and liquids that contain unhealthy fats and oils:      ? Whole or 2% milk, cream cheese, sour cream, or cheese    ? High-fat cuts of beef (T-bone steaks, ribs), chicken or turkey with skin, and organ meats such as liver    ? Butter, stick margarine, shortening, and cooking oils such as coconut or palm oil    • Foods and liquids high in sodium:      ? Packaged foods, such as frozen dinners, cookies, macaroni and cheese, and cereals with more than 300 mg of sodium per serving    ? Vegetables with added sodium, such as instant potatoes, vegetables with added sauces, or regular canned vegetables    ? Cured or smoked meats, such as hot dogs, dawson, and sausage    ? High-sodium ketchup, barbecue sauce, salad dressing, pickles, olives, soy sauce, or miso    • Foods and liquids high in sugar:      ? Candy, cake, cookies, pies, or doughnuts    ?  Soft drinks (soda), sports drinks, or sweetened tea    ? Canned or dry mixes for cakes, soups, sauces, or gravies    Other healthy heart guidelines:   • Do not smoke. Nicotine and other chemicals in cigarettes and cigars can cause lung and heart damage. Ask your healthcare provider for information if you currently smoke and need help to quit. E-cigarettes or smokeless tobacco still contain nicotine. Talk to your provider before you use these products. • Limit or do not drink alcohol as directed. Alcohol can damage your heart and raise your blood pressure. Your healthcare provider may give you specific daily and weekly limits. The general recommended limit is 1 drink a day for women 21 or older and for men 72 or older. Do not have more than 3 drinks within 24 hours or 7 within a week. The recommended limit is 2 drinks a day for men 24to 59years of age. Do not have more than 4 drinks within 24 hours or 14 within a week. A drink of alcohol is 12 ounces of beer, 5 ounces of wine, or 1½ ounces of liquor. • Maintain a healthy weight. Extra body weight makes your heart work harder. Ask your provider what a healthy weight is for you. He or she can help you create a safe weight loss plan, if needed. • Exercise regularly. Exercise can help you maintain a healthy weight and improve your blood pressure and cholesterol levels. Regular exercise can also decrease your risk for heart problems. Ask your provider about the best exercise plan for you. Do not start an exercise program without asking your provider. Follow up with your doctor or cardiologist as directed:  Write down your questions so you remember to ask them during your visits. © Copyright [employer] 2022 Information is for End User's use only and may not be sold, redistributed or otherwise used for commercial purposes. The above information is an  only. It is not intended as medical advice for individual conditions or treatments.  Talk to your doctor, nurse or pharmacist before following any medical regimen to see if it is safe and effective for you. Wellness Visit for Adults   AMBULATORY CARE:   A wellness visit  is when you see your healthcare provider to get screened for health problems. Your healthcare provider will also give you advice on how to stay healthy. Write down your questions so you remember to ask them. Ask your healthcare provider how often you should have a wellness visit. What happens at a wellness visit:  Your healthcare provider will ask about your health, and your family history of health problems. This includes high blood pressure, heart disease, and cancer. He or she will ask if you have symptoms that concern you, if you smoke, and about your mood. You may also be asked about your intake of medicines, supplements, food, and alcohol. Any of the following may be done:  • Your weight  will be checked. Your height may also be checked so your body mass index (BMI) can be calculated. Your BMI shows if you are at a healthy weight. • Your blood pressure  and heart rate will be checked. Your temperature may also be checked. • Blood and urine tests  may be done. Blood tests may be done to check your cholesterol levels. Abnormal cholesterol levels increase your risk for heart disease and stroke. You may also need a blood or urine test to check for diabetes if you are at increased risk. Urine tests may be done to look for signs of an infection or kidney disease. • A physical exam  includes checking your heartbeat and lungs with a stethoscope. Your healthcare provider may also check your skin to look for sun damage. • Screening tests  may be recommended. A screening test is done to check for diseases that may not cause symptoms. The screening tests you may need depend on your age, gender, family history, and lifestyle habits. For example, colorectal screening may be recommended if you are 48years old or older.     Screening tests you need if you are a woman:   • A Pap smear  is used to screen for cervical cancer. Pap smears are usually done every 3 to 5 years depending on your age. You may need them more often if you have had abnormal Pap smear test results in the past. Ask your healthcare provider how often you should have a Pap smear. • A mammogram  is an x-ray of your breasts to screen for breast cancer. Experts recommend mammograms every 2 years starting at age 48 years. You may need a mammogram at age 52 years or younger if you have an increased risk for breast cancer. Talk to your healthcare provider about when you should start having mammograms and how often you need them. Vaccines you may need:   • Get an influenza vaccine  every year. The influenza vaccine protects you from the flu. Several types of viruses cause the flu. The viruses change over time, so new vaccines are made each year. • Get a tetanus-diphtheria (Td) booster vaccine  every 10 years. This vaccine protects you against tetanus and diphtheria. Tetanus is a severe infection that may cause painful muscle spasms and lockjaw. Diphtheria is a severe bacterial infection that causes a thick covering in the back of your mouth and throat. • Get a human papillomavirus (HPV) vaccine  if you are female and aged 23 to 32 or male 23 to 24 and never received it. This vaccine protects you from HPV infection. HPV is the most common infection spread by sexual contact. HPV may also cause vaginal, penile, and anal cancers. • Get a pneumococcal vaccine  if you are aged 72 years or older. The pneumococcal vaccine is an injection given to protect you from pneumococcal disease. Pneumococcal disease is an infection caused by pneumococcal bacteria. The infection may cause pneumonia, meningitis, or an ear infection. • Get a shingles vaccine  if you are 60 or older, even if you have had shingles before. The shingles vaccine is an injection to protect you from the varicella-zoster virus.  This is the same virus that causes chickenpox. Shingles is a painful rash that develops in people who had chickenpox or have been exposed to the virus. How to eat healthy:  My Plate is a model for planning healthy meals. It shows the types and amounts of foods that should go on your plate. Fruits and vegetables make up about half of your plate, and grains and protein make up the other half. A serving of dairy is included on the side of your plate. The amount of calories and serving sizes you need depends on your age, gender, weight, and height. Examples of healthy foods are listed below:  • Eat a variety of vegetables  such as dark green, red, and orange vegetables. You can also include canned vegetables low in sodium (salt) and frozen vegetables without added butter or sauces. • Eat a variety of fresh fruits , canned fruit in 100% juice, frozen fruit, and dried fruit. • Include whole grains. At least half of the grains you eat should be whole grains. Examples include whole-wheat bread, wheat pasta, brown rice, and whole-grain cereals such as oatmeal.    • Eat a variety of protein foods such as seafood (fish and shellfish), lean meat, and poultry without skin (turkey and chicken). Examples of lean meats include pork leg, shoulder, or tenderloin, and beef round, sirloin, tenderloin, and extra lean ground beef. Other protein foods include eggs and egg substitutes, beans, peas, soy products, nuts, and seeds. • Choose low-fat dairy products such as skim or 1% milk or low-fat yogurt, cheese, and cottage cheese. • Limit unhealthy fats  such as butter, hard margarine, and shortening. Exercise:  Exercise at least 30 minutes per day on most days of the week. Some examples of exercise include walking, biking, dancing, and swimming. You can also fit in more physical activity by taking the stairs instead of the elevator or parking farther away from stores. Include muscle strengthening activities 2 days each week.  Regular exercise provides many health benefits. It helps you manage your weight, and decreases your risk for type 2 diabetes, heart disease, stroke, and high blood pressure. Exercise can also help improve your mood. Ask your healthcare provider about the best exercise plan for you. General health and safety guidelines:   • Do not smoke. Nicotine and other chemicals in cigarettes and cigars can cause lung damage. Ask your healthcare provider for information if you currently smoke and need help to quit. E-cigarettes or smokeless tobacco still contain nicotine. Talk to your healthcare provider before you use these products. • Limit alcohol. A drink of alcohol is 12 ounces of beer, 5 ounces of wine, or 1½ ounces of liquor. • Lose weight, if needed. Being overweight increases your risk of certain health conditions. These include heart disease, high blood pressure, type 2 diabetes, and certain types of cancer. • Protect your skin. Do not sunbathe or use tanning beds. Use sunscreen with a SPF 15 or higher. Apply sunscreen at least 15 minutes before you go outside. Reapply sunscreen every 2 hours. Wear protective clothing, hats, and sunglasses when you are outside. • Drive safely. Always wear your seatbelt. Make sure everyone in your car wears a seatbelt. A seatbelt can save your life if you are in an accident. Do not use your cell phone when you are driving. This could distract you and cause an accident. Pull over if you need to make a call or send a text message. • Practice safe sex. Use latex condoms if are sexually active and have more than one partner. Your healthcare provider may recommend screening tests for sexually transmitted infections (STIs). • Wear helmets, lifejackets, and protective gear. Always wear a helmet when you ride a bike or motorcycle, go skiing, or play sports that could cause a head injury. Wear protective equipment when you play sports.  Wear a lifejacket when you are on a boat or doing water sports. © Copyright Shantell Gamboa 2022 Information is for End User's use only and may not be sold, redistributed or otherwise used for commercial purposes. The above information is an  only. It is not intended as medical advice for individual conditions or treatments. Talk to your doctor, nurse or pharmacist before following any medical regimen to see if it is safe and effective for you.

## 2023-09-13 NOTE — PROGRESS NOTES
BMI Counseling: Body mass index is 30.42 kg/m². The BMI is above normal. Nutrition recommendations include reducing portion sizes, decreasing overall calorie intake, 3-5 servings of fruits/vegetables daily, reducing fast food intake, consuming healthier snacks, decreasing soda and/or juice intake, moderation in carbohydrate intake, increasing intake of lean protein, reducing intake of saturated fat and trans fat and reducing intake of cholesterol. Exercise recommendations include exercising 3-5 times per week. [id number] [facility]    NAME: Barb Rodríguez  AGE: 50 y.o. SEX: female  : 1975     DATE: 2023     Assessment and Plan:   Patient states her back pain has slowly improved. I did give her prescription for ibuprofen to take as needed with food. She will call us in the next couple weeks if her pain continues or increases. I reviewed blood work with her. Continue to watch diet. Her hemoglobin A1c was most likely elevated because she was on steroids a month prior. Follow-up in 6 months or as needed  Problem List Items Addressed This Visit    None  Visit Diagnoses     Annual physical exam    -  Primary    BMI 30.0-30.9,adult        Left low back pain, unspecified chronicity, unspecified whether sciatica present        Relevant Medications    ibuprofen (MOTRIN) 600 mg tablet          Immunizations and preventive care screenings were discussed with patient today. Appropriate education was printed on patient's after visit summary. Counseling:  • Dental Health: discussed importance of regular tooth brushing, flossing, and dental visits. · Exercise: the importance of regular exercise/physical activity was discussed. Recommend exercise 3-5 times per week for at least 30 minutes. Depression Screening and Follow-up Plan: Patient was screened for depression during today's encounter.  They screened negative with a PHQ-2 score of 0.        Return in about 6 months (around 3/13/2024) for Recheck. Chief Complaint:     Chief Complaint   Patient presents with   • Annual Exam     Doing good      History of Present Illness: Well visit. Patient states back in July was throwing hay and since then has had lower back pain and groin discomfort. Groin discomfort has improved. She still has low back discomfort off and on. It is slowly improving also. She states ibuprofen does help. Adult Annual Physical   Patient here for a comprehensive physical exam. The patient reports problems - Low back pain and groin pain. Diet and Physical Activity  · Diet/Nutrition: well balanced diet. · Exercise: walking. Depression Screening  PHQ-2/9 Depression Screening    Little interest or pleasure in doing things: 0 - not at all  Feeling down, depressed, or hopeless: 0 - not at all  PHQ-2 Score: 0  PHQ-2 Interpretation: Negative depression screen       General Health  · Sleep: sleeps well. · Hearing: normal - bilateral.  · Vision: no vision problems. · Dental: regular dental visits. /GYN Health  · Patient is: premenopausal  · Last menstrual period: regular  · Contraceptive method: none. Review of Systems:     Review of Systems   Constitutional: Negative. Respiratory: Negative. Cardiovascular: Negative. Gastrointestinal: Negative. Genitourinary: Negative. Musculoskeletal: Positive for back pain.       Past Medical History:     Past Medical History:   Diagnosis Date   • Abnormal Pap smear of cervix    • Allergic rhinitis     Resolved 7/14/2017    • Anxiety    • Generalized anxiety disorder     Resolved 7/14/2017       Past Surgical History:     Past Surgical History:   Procedure Laterality Date   • COLPOSCOPY     • NO PAST SURGERIES        Social History:     Social History     Socioeconomic History   • Marital status: /Civil Union     Spouse name: None   • Number of children: None   • Years of education: None   • Highest education level: None   Occupational History   • None   Tobacco Use   • Smoking status: Former     Types: Cigarettes   • Smokeless tobacco: Never   Vaping Use   • Vaping Use: Never used   Substance and Sexual Activity   • Alcohol use: Yes     Comment: occasional    • Drug use: Never   • Sexual activity: Yes     Partners: Male     Birth control/protection: Rhythm   Other Topics Concern   • None   Social History Narrative   • None     Social Determinants of Health     Financial Resource Strain: Not on file   Food Insecurity: Not on file   Transportation Needs: Not on file   Physical Activity: Not on file   Stress: Not on file   Social Connections: Not on file   Intimate Partner Violence: Not on file   Housing Stability: Not on file      Family History:     Family History   Problem Relation Age of Onset   • Cancer Mother    • Colon cancer Mother    • Colon polyps Mother    • Uterine cancer Mother    • Heart disease Father    • Colon cancer Maternal Grandmother    • Colon cancer Cousin       Current Medications:     Current Outpatient Medications   Medication Sig Dispense Refill   • albuterol (PROVENTIL HFA,VENTOLIN HFA) 90 mcg/act inhaler Inhale 2 puffs every 6 (six) hours as needed for wheezing 18 g 0   • fluticasone (FLONASE) 50 mcg/act nasal spray 2 sprays into each nostril daily 16 g 3   • ibuprofen (MOTRIN) 600 mg tablet Take 1 tablet (600 mg total) by mouth every 8 (eight) hours as needed for moderate pain 30 tablet 0   • Magnesium Oxide 400 MG CAPS Take 1 tablet 1 to 2 times daily with onset of menstrual symptoms  0   • clotrimazole-betamethasone (LOTRISONE) 1-0.05 % cream Apply topically 2 (two) times a day (Patient not taking: Reported on 9/13/2023) 45 g 1   • EPINEPHrine (EPIPEN) 0.3 mg/0.3 mL SOAJ Inject 0.3 mL (0.3 mg total) into a muscle once for 1 dose PRN bee sting 0.6 mL 0     No current facility-administered medications for this visit.       Allergies:     No Known Allergies   Physical Exam: /80   Pulse 74   Temp 98 °F (36.7 °C)   Ht 5' 10" (1.778 m)   Wt 96.2 kg (212 lb)   SpO2 98%   BMI 30.42 kg/m²     Physical Exam  Vitals reviewed. Constitutional:       General: She is not in acute distress. Appearance: Normal appearance. She is well-developed. She is not diaphoretic. HENT:      Head: Normocephalic and atraumatic. Eyes:      Conjunctiva/sclera: Conjunctivae normal.   Cardiovascular:      Rate and Rhythm: Normal rate and regular rhythm. Heart sounds: Normal heart sounds. No murmur heard. No friction rub. No gallop. Pulmonary:      Effort: Pulmonary effort is normal. No respiratory distress. Breath sounds: Normal breath sounds. No wheezing, rhonchi or rales. Musculoskeletal:         General: Tenderness (Mild L PSIS tenderness) present. Right lower leg: No edema. Left lower leg: No edema. Neurological:      General: No focal deficit present. Mental Status: She is alert and oriented to person, place, and time. Psychiatric:         Mood and Affect: Mood normal.         Behavior: Behavior normal.         Thought Content:  Thought content normal.         Judgment: Judgment normal.          DO Trey Berry

## 2023-10-15 ENCOUNTER — NURSE TRIAGE (OUTPATIENT)
Dept: OTHER | Facility: OTHER | Age: 48
End: 2023-10-15

## 2023-10-15 DIAGNOSIS — U07.1 COVID-19: Primary | ICD-10-CM

## 2023-10-15 RX ORDER — NIRMATRELVIR AND RITONAVIR 300-100 MG
3 KIT ORAL 2 TIMES DAILY
Qty: 30 TABLET | Refills: 0 | Status: SHIPPED | OUTPATIENT
Start: 2023-10-15 | End: 2023-10-20

## 2023-10-15 NOTE — TELEPHONE ENCOUNTER
Regarding: Covid Paxlovid  ----- Message from Cipio sent at 10/15/2023  7:44 AM EDT -----  " Pt called in she os Covid positive and the symptoms started on Friday and would like the doctor to send in [address] [place] [address] to the her local pharmacy"

## 2023-10-15 NOTE — TELEPHONE ENCOUNTER
Reason for Disposition  • [1] DIARS-34 diagnosed by positive lab test (e.g., PCR, rapid self-test kit) AND [2] mild symptoms (e.g., cough, fever, others) AND [1] no complications or SOB    Protocols used: Coronavirus (COVID-19) Diagnosed or Suspected-ADULT-

## 2023-10-15 NOTE — TELEPHONE ENCOUNTER
Answer Assessment - Initial Assessment Questions  1. COVID-19 DIAGNOSIS: "Who made your COVID-19 diagnosis?" "Was it confirmed by a positive lab test or self-test?" If not diagnosed by a doctor (or NP/PA), ask "Are there lots of cases (community spread) where you live?" Note: See public health department website, if unsure. Home test this morning    2. COVID-19 EXPOSURE: "Was there any known exposure to COVID before the symptoms began?" CDC Definition of close contact: within 6 feet (2 meters) for a total of 15 minutes or more over a 24-hour period. No    3. ONSET: "When did the COVID-19 symptoms start?"       Friday     4. WORST SYMPTOM: "What is your worst symptom?" (e.g., cough, fever, shortness of breath, muscle aches)      Coughing, fever, muscle aches, fatigue/weakness    5. COUGH: "Do you have a cough?" If Yes, ask: "How bad is the cough?"        Yes - productive, yellow sputum    6. FEVER: "Do you have a fever?" If Yes, ask: "What is your temperature, how was it measured, and when did it start?"      Temp not measured. Chills and sweats    7. RESPIRATORY STATUS: "Describe your breathing?" (e.g., shortness of breath, wheezing, unable to speak)       Denies SOB. Congested    8. BETTER-SAME-WORSE: "Are you getting better, staying the same or getting worse compared to yesterday?"  If getting worse, ask, "In what way?"      Worse    9. HIGH RISK DISEASE: "Do you have any chronic medical problems?" (e.g., asthma, heart or lung disease, weak immune system, obesity, etc.)      See chart    10. VACCINE: "Have you had the COVID-19 vaccine?" If Yes, ask: "Which one, how many shots, when did you get it?"        Yes - 4 shots    11. BOOSTER: "Have you received your COVID-19 booster?" If Yes, ask: "Which one and when did you get it?"        No    12. PREGNANCY: "Is there any chance you are pregnant?" "When was your last menstrual period?"        No    13.  OTHER SYMPTOMS: "Do you have any other symptoms?" (e.g., chills, fatigue, headache, loss of smell or taste, muscle pain, sore throat)        Sore throat    Protocols used: Coronavirus (COVID-19) Diagnosed or Suspected-ADULT-AH    Patient would like to have Paxlovid sent to her pharmacy, after testing positive this morning. Reached out to on-call provider- per Selena Vázquez may be sent in  nirmatrelvir & ritonavir (Paxlovid, 300/100,) tablet therapy pack - Route: Take 3 tablets by mouth 2 (two) times a day for 5 days Take 2 nirmatrelvir tablets + 1 ritonavir tablet together per dose. Care advice and recommendations given. Instructed what to monitor for and when to call back. Questions and concerns addressed. Patient verbalized understanding.

## 2023-10-18 ENCOUNTER — APPOINTMENT (OUTPATIENT)
Dept: RADIOLOGY | Facility: MEDICAL CENTER | Age: 48
End: 2023-10-18
Payer: COMMERCIAL

## 2023-10-18 ENCOUNTER — TELEPHONE (OUTPATIENT)
Dept: FAMILY MEDICINE CLINIC | Facility: CLINIC | Age: 48
End: 2023-10-18

## 2023-10-18 DIAGNOSIS — U07.1 COVID: Primary | ICD-10-CM

## 2023-10-18 DIAGNOSIS — U07.1 COVID: ICD-10-CM

## 2023-10-18 PROCEDURE — 71046 X-RAY EXAM CHEST 2 VIEWS: CPT

## 2023-10-18 RX ORDER — AZITHROMYCIN 250 MG/1
TABLET, FILM COATED ORAL
Qty: 6 TABLET | Refills: 0 | Status: SHIPPED | OUTPATIENT
Start: 2023-10-18 | End: 2023-10-22

## 2023-10-18 NOTE — TELEPHONE ENCOUNTER
Pt called stating she was covid positive on Friday she started paxlovid on Sunday     She states she thinks she may have something secondary going on like bronchitis   She  states that she is blowing yellow thick mucus with blood streaks and is also coughing up thick yellow mucus.  She is still getting night sweats     She feels this as gotten much worse and has not improved with the medication     Please advice

## 2023-10-18 NOTE — TELEPHONE ENCOUNTER
I will get a chest x-ray on her. Rx put in for a Z-Butch. Also get Mucinex over-the-counter. Push fluids. Get a room humidifier. If increased shortness of breath, would need to go to the ER.

## 2023-12-13 DIAGNOSIS — M54.50 LEFT LOW BACK PAIN, UNSPECIFIED CHRONICITY, UNSPECIFIED WHETHER SCIATICA PRESENT: ICD-10-CM

## 2023-12-13 RX ORDER — IBUPROFEN 600 MG/1
600 TABLET ORAL EVERY 8 HOURS PRN
Qty: 30 TABLET | Refills: 0 | Status: SHIPPED | OUTPATIENT
Start: 2023-12-13

## 2024-01-31 ENCOUNTER — OFFICE VISIT (OUTPATIENT)
Dept: URGENT CARE | Facility: CLINIC | Age: 49
End: 2024-01-31
Payer: COMMERCIAL

## 2024-01-31 VITALS — OXYGEN SATURATION: 99 % | TEMPERATURE: 97.7 F | RESPIRATION RATE: 17 BRPM | HEART RATE: 77 BPM

## 2024-01-31 DIAGNOSIS — J01.40 ACUTE PANSINUSITIS, RECURRENCE NOT SPECIFIED: Primary | ICD-10-CM

## 2024-01-31 DIAGNOSIS — H65.193 ACUTE EFFUSION OF BOTH MIDDLE EARS: ICD-10-CM

## 2024-01-31 PROCEDURE — 99203 OFFICE O/P NEW LOW 30 MIN: CPT

## 2024-01-31 RX ORDER — AMOXICILLIN AND CLAVULANATE POTASSIUM 875; 125 MG/1; MG/1
1 TABLET, FILM COATED ORAL EVERY 12 HOURS SCHEDULED
Qty: 14 TABLET | Refills: 0 | Status: SHIPPED | OUTPATIENT
Start: 2024-01-31 | End: 2024-02-07

## 2024-01-31 RX ORDER — MECLIZINE HYDROCHLORIDE 25 MG/1
25 TABLET ORAL 3 TIMES DAILY PRN
Qty: 30 TABLET | Refills: 0 | Status: SHIPPED | OUTPATIENT
Start: 2024-01-31

## 2024-01-31 NOTE — PROGRESS NOTES
[facility]        NAME: Heather Reaves is a 48 y.o. female  : 1975    MRN: [id number]  DATE: 2024  TIME: 1:46 PM    Assessment and Plan   Acute pansinusitis, recurrence not specified [J01.40]  1. Acute pansinusitis, recurrence not specified  amoxicillin-clavulanate (AUGMENTIN) 875-125 mg per tablet      2. Acute effusion of both middle ears  meclizine (ANTIVERT) 25 mg tablet        Maxillary and frontal sinus pressure and tenderness bilaterally x 5 days.  History of sinus infections in the past.  Mild ear effusion present bilaterally, causing some mild dizziness occasionally.  Will treat with Augmentin and meclizine.  Advised to follow-up with family doctor.  ER if any symptoms worsen.    Patient Instructions     Take prescribed medication as instructed.  Take antibiotic with food avoid upset stomach.  Tylenol or ibuprofen for pain or fever.  Continue with Flonase and nasal saline rinses.  Recommended doing twice daily (1 spray in each nostril).  Avoid quick and sudden movements.  Make sure to drink plenty of fluids and rest.  If no improvement, follow-up with family doctor.  Go to the emergency room if any symptoms worsen.  Follow up with PCP in 3-5 days.  Proceed to  ER if symptoms worsen.    Chief Complaint     Chief Complaint   Patient presents with    Nasal Congestion     Started 5 days ago  COVID tested X 3 last tested 1 day ago (negative)      Nausea         History of Present Illness       48-year-old female presents the clinic with 5 days of nasal congestion and nausea.  States that she had 3 at home COVID test over the last day, all were negative.  History of sinus issues in the past.  He does admit to some occasional positional dizziness and pressure in the ears.  Denies ear pain.  Denies any fever, chills, chest pain, shortness of breath, sore throat, vomiting, diarrhea, abdominal pain, constipation.      Nausea  Associated symptoms include congestion and nausea. Pertinent  negatives include no abdominal pain, chills, fever, sore throat, vomiting or weakness.       Review of Systems   Review of Systems   Constitutional:  Negative for chills and fever.   HENT:  Positive for congestion and sinus pressure. Negative for ear discharge, ear pain and sore throat.    Eyes: Negative.    Respiratory: Negative.     Cardiovascular: Negative.    Gastrointestinal:  Positive for nausea. Negative for abdominal distention, abdominal pain, constipation, diarrhea and vomiting.   Genitourinary: Negative.    Musculoskeletal: Negative.    Skin: Negative.    Neurological:  Positive for dizziness. Negative for syncope, facial asymmetry, speech difficulty and weakness.         Current Medications       Current Outpatient Medications:     albuterol (PROVENTIL HFA,VENTOLIN HFA) 90 mcg/act inhaler, Inhale 2 puffs every 6 (six) hours as needed for wheezing, Disp: 18 g, Rfl: 0    amoxicillin-clavulanate (AUGMENTIN) 875-125 mg per tablet, Take 1 tablet by mouth every 12 (twelve) hours for 7 days, Disp: 14 tablet, Rfl: 0    fluticasone (FLONASE) 50 mcg/act nasal spray, 2 sprays into each nostril daily, Disp: 16 g, Rfl: 3    ibuprofen (MOTRIN) 600 mg tablet, Take 1 tablet (600 mg total) by mouth every 8 (eight) hours as needed for moderate pain, Disp: 30 tablet, Rfl: 0    Magnesium Oxide 400 MG CAPS, Take 1 tablet 1 to 2 times daily with onset of menstrual symptoms, Disp: , Rfl: 0    meclizine (ANTIVERT) 25 mg tablet, Take 1 tablet (25 mg total) by mouth 3 (three) times a day as needed for dizziness, Disp: 30 tablet, Rfl: 0    clotrimazole-betamethasone (LOTRISONE) 1-0.05 % cream, Apply topically 2 (two) times a day (Patient not taking: Reported on 9/13/2023), Disp: 45 g, Rfl: 1    EPINEPHrine (EPIPEN) 0.3 mg/0.3 mL SOAJ, Inject 0.3 mL (0.3 mg total) into a muscle once for 1 dose PRN bee sting, Disp: 0.6 mL, Rfl: 0    Current Allergies     Allergies as of 01/31/2024 - Reviewed 01/31/2024   Allergen Reaction Noted     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     Past Medical History:   Diagnosis Date    Abnormal Pap smear of cervix     Allergic rhinitis     Resolved 7/14/2017     Anxiety     Generalized anxiety disorder     Resolved 7/14/2017        Past Surgical History:   Procedure Laterality Date    COLPOSCOPY      NO PAST SURGERIES         Family History   Problem Relation Age of Onset    Cancer Mother     Colon cancer Mother     Colon polyps Mother     Uterine cancer Mother     Heart disease Father     Colon cancer Maternal Grandmother     Colon cancer Cousin          Medications have been verified.        Objective   Pulse 77   Temp 97.7 °F (36.5 °C)   Resp 17   SpO2 99%        Physical Exam     Physical Exam  Constitutional:       General: She is awake. She is not in acute distress.     Appearance: Normal appearance. She is not ill-appearing, toxic-appearing or diaphoretic.   HENT:      Head: Normocephalic and atraumatic.      Right Ear: Ear canal and external ear normal. No drainage, swelling or tenderness. A middle ear effusion is present. Tympanic membrane is not erythematous.      Left Ear: Ear canal and external ear normal. No drainage or swelling. A middle ear effusion is present. Tympanic membrane is not erythematous.      Nose: Congestion present.      Right Sinus: Maxillary sinus tenderness and frontal sinus tenderness present.      Left Sinus: Maxillary sinus tenderness and frontal sinus tenderness present.      Mouth/Throat:      Mouth: Mucous membranes are moist.      Pharynx: Oropharynx is clear. No oropharyngeal exudate or posterior oropharyngeal erythema.   Eyes:      Extraocular Movements: Extraocular movements intact.      Pupils: Pupils are equal, round, and reactive to light.    Cardiovascular:      Rate and Rhythm: Normal rate and regular rhythm.      Pulses: Normal pulses.      Heart sounds: Normal heart sounds.   Pulmonary:      Effort: Pulmonary effort is normal. No respiratory distress.      Breath sounds: Normal breath sounds. No stridor. No wheezing, rhonchi or rales.   Skin:     General: Skin is warm and dry.      Capillary Refill: Capillary refill takes less than 2 seconds.   Neurological:      General: No focal deficit present.      Mental Status: She is alert and easily aroused. Mental status is at baseline.   Psychiatric:         Mood and Affect: Mood normal.         Behavior: Behavior normal. Behavior is cooperative.

## 2024-01-31 NOTE — PATIENT INSTRUCTIONS
Take prescribed medication as instructed.  Take antibiotic with food avoid upset stomach.  Tylenol or ibuprofen for pain or fever.  Continue with Flonase and nasal saline rinses.  Recommended doing twice daily (1 spray in each nostril).  Avoid quick and sudden movements.  Make sure to drink plenty of fluids and rest.  If no improvement, follow-up with family doctor.  Go to the emergency room if any symptoms worsen.  Follow up with PCP in 3-5 days.  Proceed to  ER if symptoms worsen.  Sinusitis   WHAT YOU NEED TO KNOW:   Sinusitis is inflammation or infection of your sinuses. Sinusitis is most often caused by a virus. Acute sinusitis may last up to 12 weeks. Chronic sinusitis lasts longer than 12 weeks. Recurrent sinusitis means you have 4 or more infections in 1 year.        DISCHARGE INSTRUCTIONS:   Return to the emergency department if:   You have trouble breathing or wheezing that is getting worse.    You have a stiff neck, a fever, or a bad headache.     You cannot open your eye.     Your eyeball bulges out or you cannot move your eye.     You are more sleepy than normal, or you notice changes in your ability to think, move, or talk.    You have swelling of your forehead or scalp.    Call your doctor if:   You have vision changes, such as double vision.    Your eye and eyelid are red, swollen, and painful.     Your symptoms do not improve or go away after 10 days.    You have nausea and are vomiting.    Your nose is bleeding.    You have questions or concerns about your condition or care.    Medicines:  Your symptoms may go away on their own. Your healthcare provider may recommend watchful waiting for up to 10 days before starting antibiotics. You may need any of the following:  Acetaminophen  decreases pain and fever. It is available without a doctor's order. Ask how much to take and how often to take it. Follow directions. Read the labels of all other medicines you are using to see if they also contain  acetaminophen, or ask your doctor or pharmacist. Acetaminophen can cause liver damage if not taken correctly.    NSAIDs , such as ibuprofen, help decrease swelling, pain, and fever. This medicine is available with or without a doctor's order. NSAIDs can cause stomach bleeding or kidney problems in certain people. If you take blood thinner medicine, always ask your healthcare provider if NSAIDs are safe for you. Always read the medicine label and follow directions.    Nasal steroid sprays  may help decrease inflammation in your nose and sinuses.    Decongestants  help reduce swelling and drain mucus in the nose and sinuses. They may help you breathe easier.     Antihistamines  help dry mucus in the nose and relieve sneezing.     Antibiotics  help treat or prevent a bacterial infection.    Take your medicine as directed.  Contact your healthcare provider if you think your medicine is not helping or if you have side effects. Tell your provider if you are allergic to any medicine. Keep a list of the medicines, vitamins, and herbs you take. Include the amounts, and when and why you take them. Bring the list or the pill bottles to follow-up visits. Carry your medicine list with you in case of an emergency.    Self-care:   Rinse your sinuses as directed.  Use a sinus rinse device to rinse your nasal passages with a saline (salt water) solution or distilled water. Do not use tap water. This will help thin the mucus in your nose and rinse away pollen and dirt. It will also help reduce swelling so you can breathe normally.    Use a humidifier  to increase air moisture in your home. This may make it easier for you to breathe and help decrease your cough.     Sleep with your head elevated.  Place an extra pillow under your head before you go to sleep to help your sinuses drain.     Drink liquids as directed.  Ask your healthcare provider how much liquid to drink each day and which liquids are best for you. Liquids will thin the  mucus in your nose and help it drain. Avoid drinks that contain alcohol or caffeine.     Do not smoke, and avoid secondhand smoke.  Nicotine and other chemicals in cigarettes and cigars can make your symptoms worse. Ask your healthcare provider for information if you currently smoke and need help to quit. E-cigarettes or smokeless tobacco still contain nicotine. Talk to your healthcare provider before you use these products.    Prevent the spread of germs:   Wash your hands often with soap and water.  Wash your hands after you use the bathroom, change a child's diaper, or sneeze. Wash your hands before you prepare or eat food.         Stay away from people who are sick.  Some germs spread easily and quickly through contact.    Follow up with your doctor as directed:  You may be referred to an ear, nose, and throat specialist. Write down your questions so you remember to ask them during your visits.   © Copyright [employer] 2023 Information is for End User's use only and may not be sold, redistributed or otherwise used for commercial purposes.  The above information is an  only. It is not intended as medical advice for individual conditions or treatments. Talk to your doctor, nurse or pharmacist before following any medical regimen to see if it is safe and effective for you.

## 2024-02-05 ENCOUNTER — OFFICE VISIT (OUTPATIENT)
Dept: FAMILY MEDICINE CLINIC | Facility: CLINIC | Age: 49
End: 2024-02-05
Payer: COMMERCIAL

## 2024-02-05 VITALS
SYSTOLIC BLOOD PRESSURE: 112 MMHG | BODY MASS INDEX: 30.26 KG/M2 | WEIGHT: 211.4 LBS | TEMPERATURE: 99 F | HEIGHT: 70 IN | DIASTOLIC BLOOD PRESSURE: 78 MMHG | HEART RATE: 101 BPM | OXYGEN SATURATION: 97 %

## 2024-02-05 DIAGNOSIS — B34.9 VIRAL SYNDROME: Primary | ICD-10-CM

## 2024-02-05 PROCEDURE — 99213 OFFICE O/P EST LOW 20 MIN: CPT | Performed by: FAMILY MEDICINE

## 2024-02-05 RX ORDER — BENZONATATE 100 MG/1
100 CAPSULE ORAL 3 TIMES DAILY PRN
Qty: 20 CAPSULE | Refills: 0 | Status: SHIPPED | OUTPATIENT
Start: 2024-02-05

## 2024-02-05 RX ORDER — OSELTAMIVIR PHOSPHATE 75 MG/1
75 CAPSULE ORAL 2 TIMES DAILY
Qty: 10 CAPSULE | Refills: 0 | Status: SHIPPED | OUTPATIENT
Start: 2024-02-05 | End: 2024-02-10

## 2024-02-05 NOTE — PROGRESS NOTES
Name: Heather Reaves      : 1975      MRN: [id number]  Encounter Provider: Mauro Mejia DO  Encounter Date: 2024   Encounter department: [facility]    Assessment & Plan   I believe she probably has the flu since she tested negative for COVID.  Rx put in for Tamiflu.  Push fluids.  Call if symptoms continue or increase.  1. Viral syndrome  -     oseltamivir (TAMIFLU) 75 mg capsule; Take 1 capsule (75 mg total) by mouth 2 (two) times a day for 5 days  -     benzonatate (TESSALON PERLES) 100 mg capsule; Take 1 capsule (100 mg total) by mouth 3 (three) times a day as needed for cough        Depression Screening and Follow-up Plan: Patient was screened for depression during today's encounter. They screened negative with a PHQ-2 score of 0.        Subjective     Patient here today stating that last week he was having some sinus congestion and was seen at urgent care and given Augmentin.  Yesterday she woke up with headache and body aches and a cough.  She thinks it is 2 separate things.  No nausea or vomiting.  Tested negative for COVID    Cough  Associated symptoms include headaches and a sore throat.   Generalized Body Aches  Associated symptoms include headaches, a sore throat, fatigue and coughing.   Sore Throat   Associated symptoms include coughing and headaches.   Headache    Review of Systems   Constitutional:  Positive for fatigue.   HENT:  Positive for sore throat.    Respiratory:  Positive for cough.    Cardiovascular: Negative.    Gastrointestinal: Negative.    Genitourinary: Negative.    Neurological:  Positive for headaches.       Past Medical History:   Diagnosis Date   • Abnormal Pap smear of cervix    • Allergic rhinitis     Resolved 2017    • Anxiety    • Generalized anxiety disorder     Resolved 2017      Past Surgical History:   Procedure Laterality Date   • COLPOSCOPY     • NO PAST SURGERIES       Family History   Problem Relation Age of Onset   • Cancer Mother     • Colon cancer Mother    • Colon polyps Mother    • Uterine cancer Mother    • Heart disease Father    • Colon cancer Maternal Grandmother    • Colon cancer Cousin      Social History     Socioeconomic History   • Marital status: /Civil Union     Spouse name: None   • Number of children: None   • Years of education: None   • Highest education level: None   Occupational History   • None   Tobacco Use   • Smoking status: Former     Types: Cigarettes   • Smokeless tobacco: Never   Vaping Use   • Vaping status: Never Used   Substance and Sexual Activity   • Alcohol use: Yes     Comment: occasional    • Drug use: Never   • Sexual activity: Yes     Partners: Male     Birth control/protection: Rhythm   Other Topics Concern   • None   Social History Narrative   • None     Social Determinants of Health     Financial Resource Strain: Not on file   Food Insecurity: Not on file   Transportation Needs: Not on file   Physical Activity: Not on file   Stress: Not on file   Social Connections: Not on file   Intimate Partner Violence: Not on file   Housing Stability: Not on file     Current Outpatient Medications on File Prior to Visit   Medication Sig   • albuterol (PROVENTIL HFA,VENTOLIN HFA) 90 mcg/act inhaler Inhale 2 puffs every 6 (six) hours as needed for wheezing   • amoxicillin-clavulanate (AUGMENTIN) 875-125 mg per tablet Take 1 tablet by mouth every 12 (twelve) hours for 7 days   • fluticasone (FLONASE) 50 mcg/act nasal spray 2 sprays into each nostril daily   • ibuprofen (MOTRIN) 600 mg tablet Take 1 tablet (600 mg total) by mouth every 8 (eight) hours as needed for moderate pain   • Magnesium Oxide 400 MG CAPS Take 1 tablet 1 to 2 times daily with onset of menstrual symptoms   • meclizine (ANTIVERT) 25 mg tablet Take 1 tablet (25 mg total) by mouth 3 (three) times a day as needed for dizziness (Patient taking differently: Take 25 mg by mouth 3 (three) times a day as needed for dizziness prn)   •  "clotrimazole-betamethasone (LOTRISONE) 1-0.05 % cream Apply topically 2 (two) times a day (Patient not taking: Reported on 9/13/2023)   • EPINEPHrine (EPIPEN) 0.3 mg/0.3 mL SOAJ Inject 0.3 mL (0.3 mg total) into a muscle once for 1 dose PRN bee sting     Allergies   Allergen Reactions   • Bee Venom Anaphylaxis     Immunization History   Administered Date(s) Administered   • COVID-19 MODERNA VACC 0.25 ML IM BOOSTER 02/16/2022   • COVID-19 MODERNA VACC 0.5 ML IM 12/24/2020, 01/25/2021   • COVID-19 Pfizer Vac BIVALENT Steven-sucrose 12 Yr+ IM 09/30/2022   • INFLUENZA 10/12/2018, 11/19/2019, 11/15/2022   • Influenza Quadrivalent Preservative Free 3 years and older IM 11/15/2016, 10/07/2017, 10/05/2023   • Influenza, injectable, quadrivalent, preservative free 0.5 mL 08/27/2021   • Influenza, seasonal, injectable 10/20/2014   • MMR 01/28/2019, 05/05/2023   • Tdap 11/08/2017       Objective     /78   Pulse 101   Temp 99 °F (37.2 °C)   Ht 5' 10\" (1.778 m)   Wt 95.9 kg (211 lb 6.4 oz)   SpO2 97%   BMI 30.33 kg/m²     Physical Exam  Vitals reviewed.   Constitutional:       General: She is not in acute distress.     Appearance: Normal appearance. She is well-developed. She is ill-appearing. She is not toxic-appearing or diaphoretic.   HENT:      Head: Normocephalic and atraumatic.   Eyes:      Conjunctiva/sclera: Conjunctivae normal.   Cardiovascular:      Rate and Rhythm: Normal rate and regular rhythm.      Heart sounds: Normal heart sounds. No murmur heard.     No friction rub. No gallop.   Pulmonary:      Effort: Pulmonary effort is normal. No respiratory distress.      Breath sounds: Normal breath sounds. No wheezing, rhonchi or rales.   Musculoskeletal:      Right lower leg: No edema.      Left lower leg: No edema.   Neurological:      General: No focal deficit present.      Mental Status: She is alert and oriented to person, place, and time.   Psychiatric:         Mood and Affect: Mood normal.         " Behavior: Behavior normal.         Thought Content: Thought content normal.         Judgment: Judgment normal.       Mauro Mejia, DO

## 2024-03-13 ENCOUNTER — OFFICE VISIT (OUTPATIENT)
Dept: FAMILY MEDICINE CLINIC | Facility: CLINIC | Age: 49
End: 2024-03-13
Payer: COMMERCIAL

## 2024-03-13 VITALS
BODY MASS INDEX: 30.43 KG/M2 | TEMPERATURE: 97.7 F | HEIGHT: 70 IN | SYSTOLIC BLOOD PRESSURE: 128 MMHG | WEIGHT: 212.6 LBS | HEART RATE: 77 BPM | OXYGEN SATURATION: 97 % | DIASTOLIC BLOOD PRESSURE: 88 MMHG

## 2024-03-13 DIAGNOSIS — Z91.030 ALLERGY TO HONEY BEE VENOM: ICD-10-CM

## 2024-03-13 DIAGNOSIS — J30.1 NON-SEASONAL ALLERGIC RHINITIS DUE TO POLLEN: ICD-10-CM

## 2024-03-13 DIAGNOSIS — L98.9 FACIAL LESION: ICD-10-CM

## 2024-03-13 DIAGNOSIS — Z12.31 SCREENING MAMMOGRAM FOR BREAST CANCER: ICD-10-CM

## 2024-03-13 DIAGNOSIS — Z91.018 FOOD ALLERGY: Primary | ICD-10-CM

## 2024-03-13 PROCEDURE — 99214 OFFICE O/P EST MOD 30 MIN: CPT | Performed by: FAMILY MEDICINE

## 2024-03-13 RX ORDER — FLUTICASONE PROPIONATE 50 MCG
2 SPRAY, SUSPENSION (ML) NASAL DAILY
Qty: 16 G | Refills: 3 | Status: SHIPPED | OUTPATIENT
Start: 2024-03-13

## 2024-03-13 RX ORDER — EPINEPHRINE 0.3 MG/.3ML
0.3 INJECTION SUBCUTANEOUS ONCE
Qty: 0.6 ML | Refills: 0 | Status: SHIPPED | OUTPATIENT
Start: 2024-03-13 | End: 2024-03-13

## 2024-03-13 NOTE — PATIENT INSTRUCTIONS
Heart Healthy Diet   AMBULATORY CARE:   A heart healthy diet  is an eating plan low in unhealthy fats and sodium (salt). The plan is high in healthy fats and fiber. A heart healthy diet helps improve your cholesterol levels and lowers your risk for heart disease and stroke. A dietitian will teach you how to read and understand food labels.  Heart healthy diet guidelines to follow:   Choose foods that contain healthy fats:      Unsaturated fats  include monounsaturated and polyunsaturated fats. Unsaturated fat is found in foods such as soybean, canola, olive, corn, and safflower oils. It is also found in soft tub margarine that is made with liquid vegetable oil.    Omega-3 fat  is found in certain fish, such as salmon, tuna, and trout, and in walnuts and flaxseed. Eat fish high in omega-3 fats at least 2 times a week.       Limit or do not have unhealthy fats:      Cholesterol  is found in animal foods, such as eggs and lobster, and in dairy products made from whole milk. Limit cholesterol to less than 200 mg each day.    Saturated fat  is found in meats, such as dawson and hamburger. It is also found in chicken or turkey skin, whole milk, and butter. Limit saturated fat to less than 7% of your total daily calories.    Trans fat  is found in packaged foods, such as potato chips and cookies. It is also in hard margarine, some fried foods, and shortening. Do not eat foods that contain trans fats.    Get 20 to 30 grams of fiber each day.  Fruits, vegetables, whole-grain foods, and legumes (cooked beans) are good sources of fiber.         Limit sodium as directed.  You may be told to limit sodium, such as to 2,000 mg or less each day. Choose low-sodium or no-salt-added foods. Add little or no salt to food you prepare. Use herbs and spices in place of salt.       Include the following in your heart healthy plan:  Ask your dietitian or healthcare provider how many servings to have each day from the following food  groups:  Grains:      Whole-wheat breads, cereals, and pastas, and brown rice    Low-fat, low-sodium crackers and chips    Vegetables:      Broccoli, green beans, green peas, and spinach    Collards, kale, and lima beans    Carrots, sweet potatoes, tomatoes, and peppers    Canned vegetables with no salt added    Fruits:      Bananas, peaches, pears, and pineapple    Grapes, raisins, and dates    Oranges, tangerines, grapefruit, orange juice, and grapefruit juice    Apricots, mangoes, melons, and papaya    Raspberries and strawberries    Canned fruit with no added sugar    Low-fat dairy:      Nonfat (skim) milk, 1% milk, and low-fat almond, cashew, or soy milks fortified with calcium    Low-fat cheese, regular or frozen yogurt, and cottage cheese    Meats and proteins:      Lean cuts of beef and pork (loin, leg, round), skinless chicken and turkey    Legumes, soy products, egg whites, or nuts    Limit or do not include the following in your heart healthy plan:   Foods and liquids that contain unhealthy fats and oils:      Whole or 2% milk, cream cheese, sour cream, or cheese    High-fat cuts of beef (T-bone steaks, ribs), chicken or turkey with skin, and organ meats such as liver    Butter, stick margarine, shortening, and cooking oils such as coconut or palm oil    Foods and liquids high in sodium:      Packaged foods, such as frozen dinners, cookies, macaroni and cheese, and cereals with more than 300 mg of sodium per serving    Vegetables with added sodium, such as instant potatoes, vegetables with added sauces, or regular canned vegetables    Cured or smoked meats, such as hot dogs, dawson, and sausage    High-sodium ketchup, barbecue sauce, salad dressing, pickles, olives, soy sauce, or miso    Foods and liquids high in sugar:      Candy, cake, cookies, pies, or doughnuts    Soft drinks (soda), sports drinks, or sweetened tea    Canned or dry mixes for cakes, soups, sauces, or gravies    Other healthy heart  guidelines:   Do not smoke.  Nicotine and other chemicals in cigarettes and cigars can cause lung and heart damage. Ask your healthcare provider for information if you currently smoke and need help to quit. E-cigarettes or smokeless tobacco still contain nicotine. Talk to your provider before you use these products.    Limit or do not drink alcohol as directed.  Alcohol can damage your heart and raise your blood pressure. Your healthcare provider may give you specific daily and weekly limits. The general recommended limit is 1 drink a day for women 21 or older and for men 65 or older. Do not have more than 3 drinks within 24 hours or 7 within a week. The recommended limit is 2 drinks a day for men 21 to 64 years of age. Do not have more than 4 drinks within 24 hours or 14 within a week. A drink of alcohol is 12 ounces of beer, 5 ounces of wine, or 1½ ounces of liquor.    Maintain a healthy weight.  Extra body weight makes your heart work harder. Ask your provider what a healthy weight is for you. He or she can help you create a safe weight loss plan, if needed.    Exercise regularly.  Exercise can help you maintain a healthy weight and improve your blood pressure and cholesterol levels. Regular exercise can also decrease your risk for heart problems. Ask your provider about the best exercise plan for you. Do not start an exercise program without asking your provider.          Follow up with your doctor or cardiologist as directed:  Write down your questions so you remember to ask them during your visits.  © Copyright [employer] 2023 Information is for End User's use only and may not be sold, redistributed or otherwise used for commercial purposes.  The above information is an  only. It is not intended as medical advice for individual conditions or treatments. Talk to your doctor, nurse or pharmacist before following any medical regimen to see if it is safe and effective for you.

## 2024-03-13 NOTE — PROGRESS NOTES
Name: Heather Reaves      : 1975      MRN: [id number]  Encounter Provider: Mauro Mejia DO  Encounter Date: 3/13/2024   Encounter department: [facility]    Assessment & Plan   Allergy panel ordered.  Refilled EpiPen.  Referral to weight management and dermatology.  Follow-up in 6 months or as needed  1. Food allergy  -     Food Allergy Profile  -     [facility] Allergy Panel, Adult    2. Facial lesion  -     Ambulatory Referral to Dermatology; Future    3. BMI 30.0-30.9,adult  -     Ambulatory referral to Weight Management; Future    4. Screening mammogram for breast cancer  -     Mammo screening bilateral w 3d & cad; Future    5. Non-seasonal allergic rhinitis due to pollen  -     [facility] Allergy Panel, Adult        Depression Screening and Follow-up Plan: Patient was screened for depression during today's encounter. They screened negative with a PHQ-2 score of 0.        Subjective     Patient here today for follow-up.  Patient had an episode of facial swelling after eating lobster.  This happened around the holiday also.  She has eaten lobster in the past with no problems.  We just refilled her EpiPen today.  Patient would like to see dermatology for facial lesions, and is interested in seeing weight management to help her lose weight.      Review of Systems   Constitutional: Negative.    Respiratory: Negative.     Cardiovascular: Negative.    Gastrointestinal: Negative.    Genitourinary: Negative.        Past Medical History:   Diagnosis Date   • Abnormal Pap smear of cervix    • Allergic rhinitis     Resolved 2017    • Anxiety    • Generalized anxiety disorder     Resolved 2017      Past Surgical History:   Procedure Laterality Date   • COLPOSCOPY     • NO PAST SURGERIES       Family History   Problem Relation Age of Onset   • Cancer Mother    • Colon cancer Mother    • Colon polyps Mother    • Uterine cancer Mother    • Heart disease Father    • Colon cancer Maternal  Grandmother    • Colon cancer Cousin      Social History     Socioeconomic History   • Marital status: /Civil Union     Spouse name: None   • Number of children: None   • Years of education: None   • Highest education level: None   Occupational History   • None   Tobacco Use   • Smoking status: Former     Types: Cigarettes   • Smokeless tobacco: Never   Vaping Use   • Vaping status: Never Used   Substance and Sexual Activity   • Alcohol use: Yes     Comment: occasional    • Drug use: Never   • Sexual activity: Yes     Partners: Male     Birth control/protection: Rhythm   Other Topics Concern   • None   Social History Narrative   • None     Social Determinants of Health     Financial Resource Strain: Not on file   Food Insecurity: Not on file   Transportation Needs: Not on file   Physical Activity: Not on file   Stress: Not on file   Social Connections: Not on file   Intimate Partner Violence: Not on file   Housing Stability: Not on file     Current Outpatient Medications on File Prior to Visit   Medication Sig   • albuterol (PROVENTIL HFA,VENTOLIN HFA) 90 mcg/act inhaler Inhale 2 puffs every 6 (six) hours as needed for wheezing   • ibuprofen (MOTRIN) 600 mg tablet Take 1 tablet (600 mg total) by mouth every 8 (eight) hours as needed for moderate pain   • Magnesium Oxide 400 MG CAPS Take 1 tablet 1 to 2 times daily with onset of menstrual symptoms   • meclizine (ANTIVERT) 25 mg tablet Take 1 tablet (25 mg total) by mouth 3 (three) times a day as needed for dizziness (Patient taking differently: Take 25 mg by mouth 3 (three) times a day as needed for dizziness prn)   • [DISCONTINUED] fluticasone (FLONASE) 50 mcg/act nasal spray 2 sprays into each nostril daily   • [DISCONTINUED] benzonatate (TESSALON PERLES) 100 mg capsule Take 1 capsule (100 mg total) by mouth 3 (three) times a day as needed for cough (Patient not taking: Reported on 3/13/2024)   • [DISCONTINUED] clotrimazole-betamethasone (LOTRISONE) 1-0.05 %  "cream Apply topically 2 (two) times a day (Patient not taking: Reported on 9/13/2023)   • [DISCONTINUED] EPINEPHrine (EPIPEN) 0.3 mg/0.3 mL SOAJ Inject 0.3 mL (0.3 mg total) into a muscle once for 1 dose PRN bee sting     Allergies   Allergen Reactions   • Bee Venom Anaphylaxis   • Food Facial Swelling     Lobster     Immunization History   Administered Date(s) Administered   • COVID-19 MODERNA VACC 0.25 ML IM BOOSTER 02/16/2022   • COVID-19 MODERNA VACC 0.5 ML IM 12/24/2020, 01/25/2021   • COVID-19 Pfizer Vac BIVALENT Steven-sucrose 12 Yr+ IM 09/30/2022   • INFLUENZA 10/12/2018, 11/19/2019, 11/15/2022   • Influenza Quadrivalent Preservative Free 3 years and older IM 11/15/2016, 10/07/2017, 10/05/2023   • Influenza, injectable, quadrivalent, preservative free 0.5 mL 08/27/2021   • Influenza, seasonal, injectable 10/20/2014   • MMR 01/28/2019, 05/05/2023   • Tdap 11/08/2017       Objective     /88   Pulse 77   Temp 97.7 °F (36.5 °C)   Ht 5' 10\" (1.778 m)   Wt 96.4 kg (212 lb 9.6 oz)   SpO2 97%   BMI 30.50 kg/m²     Physical Exam  Vitals reviewed.   Constitutional:       General: She is not in acute distress.     Appearance: Normal appearance. She is well-developed. She is not ill-appearing, toxic-appearing or diaphoretic.   HENT:      Head: Normocephalic and atraumatic.   Eyes:      Conjunctiva/sclera: Conjunctivae normal.   Cardiovascular:      Rate and Rhythm: Normal rate and regular rhythm.      Heart sounds: Normal heart sounds. No murmur heard.     No friction rub. No gallop.   Pulmonary:      Effort: Pulmonary effort is normal. No respiratory distress.      Breath sounds: Normal breath sounds. No wheezing, rhonchi or rales.   Musculoskeletal:      Right lower leg: No edema.      Left lower leg: No edema.   Neurological:      General: No focal deficit present.      Mental Status: She is alert and oriented to person, place, and time.   Psychiatric:         Mood and Affect: Mood normal.         Behavior: " Behavior normal.         Thought Content: Thought content normal.         Judgment: Judgment normal.       Mauro Mejia, DO    BMI Counseling: Body mass index is 30.5 kg/m². The BMI is above normal. Patient referred to weight management due to patient being obese.

## 2024-03-15 ENCOUNTER — APPOINTMENT (OUTPATIENT)
Dept: LAB | Facility: CLINIC | Age: 49
End: 2024-03-15
Payer: COMMERCIAL

## 2024-03-15 PROCEDURE — 86003 ALLG SPEC IGE CRUDE XTRC EA: CPT | Performed by: FAMILY MEDICINE

## 2024-03-15 PROCEDURE — 86008 ALLG SPEC IGE RECOMB EA: CPT | Performed by: FAMILY MEDICINE

## 2024-03-15 PROCEDURE — 82785 ASSAY OF IGE: CPT | Performed by: FAMILY MEDICINE

## 2024-03-15 PROCEDURE — 36415 COLL VENOUS BLD VENIPUNCTURE: CPT | Performed by: FAMILY MEDICINE

## 2024-03-19 LAB
ALMOND IGE QN: <0.1 KUA/I
ARA H6 PEANUT: <0.1 KUA/I
CASHEW NUT IGE QN: <0.1 KUA/I
CODFISH IGE QN: <0.1 KUA/I
EGG WHITE IGE QN: <0.1 KUA/I
GLUTEN IGE QN: <0.1 KUA/I
HAZELNUT IGE QN: <0.1 KUA/L
MILK IGE QN: <0.1 KUA/I
PEANUT (RARA H) 1 IGE QN: <0.1 KUA/I
PEANUT (RARA H) 2 IGE QN: <0.1 KUA/I
PEANUT (RARA H) 3 IGE QN: <0.1 KUA/I
PEANUT (RARA H) 8 IGE QN: <0.1 KUA/I
PEANUT (RARA H) 9 IGE QN: <0.1 KUA/I
PEANUT IGE QN: 0.18 KUA/I
SALMON IGE QN: <0.1 KUA/I
SCALLOP IGE QN: <0.1 KUA/L
SESAME SEED IGE QN: 0.22 KUA/I
SHRIMP IGE QN: <0.1 KUA/L
SOYBEAN IGE QN: <0.1 KUA/I
TOTAL IGE SMQN RAST: 36.1 KU/L (ref 0–113)
TUNA IGE QN: <0.1 KUA/I
WALNUT IGE QN: <0.1 KUA/I
WHEAT IGE QN: 0.15 KUA/I

## 2024-03-21 LAB
A ALTERNATA IGE QN: <0.1 KUA/I
A FUMIGATUS IGE QN: <0.1 KUA/I
BERMUDA GRASS IGE QN: 0.16 KUA/I
BOXELDER IGE QN: 0.16 KUA/I
C HERBARUM IGE QN: <0.1 KUA/I
CAT DANDER IGE QN: <0.1 KUA/I
CMN PIGWEED IGE QN: 0.11 KUA/I
COMMON RAGWEED IGE QN: 0.16 KUA/I
COTTONWOOD IGE QN: 0.19 KUA/I
D FARINAE IGE QN: <0.1 KUA/I
D PTERONYSS IGE QN: <0.1 KUA/I
DOG DANDER IGE QN: <0.1 KUA/I
LONDON PLANE IGE QN: 0.17 KUA/I
MOUSE URINE PROT IGE QN: <0.1 KUA/I
MT JUNIPER IGE QN: 0.15 KUA/I
MUGWORT IGE QN: <0.1 KUA/I
P NOTATUM IGE QN: <0.1 KUA/I
ROACH IGE QN: <0.1 KUA/I
SHEEP SORREL IGE QN: 0.14 KUA/I
SILVER BIRCH IGE QN: 0.11 KUA/I
TIMOTHY IGE QN: 0.18 KUA/I
TOTAL IGE SMQN RAST: 30 KU/L (ref 0–113)
WALNUT IGE QN: 0.16 KUA/I
WHITE ASH IGE QN: 0.19 KUA/I
WHITE ELM IGE QN: 0.17 KUA/I
WHITE MULBERRY IGE QN: 0.11 KUA/I
WHITE OAK IGE QN: 0.16 KUA/I

## 2024-04-06 ENCOUNTER — HOSPITAL ENCOUNTER (OUTPATIENT)
Dept: MAMMOGRAPHY | Facility: HOSPITAL | Age: 49
Discharge: HOME | End: 2024-04-06
Payer: COMMERCIAL

## 2024-04-06 DIAGNOSIS — Z01.419 ROUTINE GYNECOLOGICAL EXAMINATION: ICD-10-CM

## 2024-04-06 DIAGNOSIS — Z12.31 ENCOUNTER FOR SCREENING MAMMOGRAM FOR BREAST CANCER: ICD-10-CM

## 2024-04-06 PROCEDURE — 77067 SCR MAMMO BI INCL CAD: CPT

## 2024-04-06 PROCEDURE — 77063 BREAST TOMOSYNTHESIS BI: CPT

## 2024-05-02 ENCOUNTER — TELEPHONE (OUTPATIENT)
Age: 49
End: 2024-05-02

## 2024-05-02 DIAGNOSIS — M54.50 LEFT LOW BACK PAIN, UNSPECIFIED CHRONICITY, UNSPECIFIED WHETHER SCIATICA PRESENT: ICD-10-CM

## 2024-05-02 DIAGNOSIS — T75.3XXD MOTION SICKNESS, SUBSEQUENT ENCOUNTER: Primary | ICD-10-CM

## 2024-05-02 RX ORDER — SCOLOPAMINE TRANSDERMAL SYSTEM 1 MG/1
1 PATCH, EXTENDED RELEASE TRANSDERMAL
Qty: 10 PATCH | Refills: 3 | Status: SHIPPED | OUTPATIENT
Start: 2024-05-02

## 2024-05-02 RX ORDER — IBUPROFEN 600 MG/1
600 TABLET ORAL EVERY 8 HOURS PRN
Qty: 30 TABLET | Refills: 1 | Status: SHIPPED | OUTPATIENT
Start: 2024-05-02

## 2024-05-02 NOTE — TELEPHONE ENCOUNTER
Pt is asking for a script of scopolamine patch was given to her before but not on her med list. Cane she have a few to last her for the summer.

## 2024-05-21 ENCOUNTER — TELEPHONE (OUTPATIENT)
Age: 49
End: 2024-05-21

## 2024-05-21 DIAGNOSIS — J06.9 UPPER RESPIRATORY TRACT INFECTION, UNSPECIFIED TYPE: Primary | ICD-10-CM

## 2024-05-21 RX ORDER — AMOXICILLIN 500 MG/1
500 CAPSULE ORAL EVERY 8 HOURS SCHEDULED
Qty: 21 CAPSULE | Refills: 0 | Status: SHIPPED | OUTPATIENT
Start: 2024-05-21 | End: 2024-05-28

## 2024-05-21 NOTE — TELEPHONE ENCOUNTER
Patient prefer not to come into the office, would like medication sent to [facility] .   Sore throat  can't's swallow feels like razor blades in her throat .

## 2024-06-24 ENCOUNTER — OFFICE VISIT (OUTPATIENT)
Dept: URGENT CARE | Facility: MEDICAL CENTER | Age: 49
End: 2024-06-24
Payer: COMMERCIAL

## 2024-06-24 VITALS
DIASTOLIC BLOOD PRESSURE: 90 MMHG | RESPIRATION RATE: 20 BRPM | SYSTOLIC BLOOD PRESSURE: 136 MMHG | BODY MASS INDEX: 29.84 KG/M2 | OXYGEN SATURATION: 99 % | WEIGHT: 208 LBS | HEART RATE: 79 BPM | TEMPERATURE: 97.3 F

## 2024-06-24 DIAGNOSIS — J02.9 SORE THROAT: ICD-10-CM

## 2024-06-24 DIAGNOSIS — J01.80 OTHER ACUTE SINUSITIS, RECURRENCE NOT SPECIFIED: Primary | ICD-10-CM

## 2024-06-24 LAB — S PYO AG THROAT QL: NEGATIVE

## 2024-06-24 PROCEDURE — 99214 OFFICE O/P EST MOD 30 MIN: CPT | Performed by: NURSE PRACTITIONER

## 2024-06-24 PROCEDURE — 87070 CULTURE OTHR SPECIMN AEROBIC: CPT | Performed by: NURSE PRACTITIONER

## 2024-06-24 PROCEDURE — 87880 STREP A ASSAY W/OPTIC: CPT | Performed by: NURSE PRACTITIONER

## 2024-06-24 RX ORDER — AMOXICILLIN AND CLAVULANATE POTASSIUM 875; 125 MG/1; MG/1
1 TABLET, FILM COATED ORAL EVERY 12 HOURS SCHEDULED
Qty: 20 TABLET | Refills: 0 | Status: SHIPPED | OUTPATIENT
Start: 2024-06-24 | End: 2024-07-04

## 2024-06-24 NOTE — PROGRESS NOTES
[facility]        NAME: Heather Reaves is a 48 y.o. female  : 1975    MRN: [id number]  DATE: 2024  TIME: 6:30 PM    Assessment and Plan   Other acute sinusitis, recurrence not specified [J01.80]  1. Other acute sinusitis, recurrence not specified  amoxicillin-clavulanate (AUGMENTIN) 875-125 mg per tablet    Throat culture      2. Sore throat  POCT rapid ANTIGEN strepA    amoxicillin-clavulanate (AUGMENTIN) 875-125 mg per tablet    Throat culture            Patient Instructions       Follow up with PCP in 3-5 days.  Proceed to  ER if symptoms worsen.    If tests have been performed at [facility], our office will contact you with results if changes need to be made to the care plan discussed with you at the visit.  You can review your full results on [facility] MyChart.    You have been prescribed augmentin for sinus infection and sorethroat.   You have been prescribed an antibiotic - you are to take an oral probiotic and eat yogurt to avoid GI issues/diarrhea.  Your strep A is negative. You have a throat culture pending. You are to download  [facility] for the results in 3-4 days.  You will be notified if the results are + You are to do warm salt water gargles 4 x daily.  Drink warm tea with honey and lemon.  Take tylenol or motrin as able for pain or fever.  Chloraseptic throat spray, cough drops.  Do not share utensils.  Change your tooth brush in 3 days.  Follow up with your PCP in 2-3 days  Go to the ED if symptoms worsen      If tests have been performed at [facility], our office will contact you with results if changes need to be made to the care plan discussed with you at the visit.  You can review your full results on [facility] MyChart.        Chief Complaint     Chief Complaint   Patient presents with    Sinusitis     Sinus pressure, drainage, and pain. Scratchy throat. All over achy. Has strep 1 month ago and daughter had it about 2 weeks ago. Daughter was tested today for strep and  was given AX for treatment. Patient did share FF with her daughter. No fevers, a little nausea but no V/D         History of Present Illness       This is a 48 year old female who states had strep some time ago and about 2 weeks ago had eaten some food with her daughter and today her daughter went to [facility] and was prescribed abx for suspected strep.  Pt states they sent out a cx but no rapid was done.  Pt states that she now has sorethroat, razor blade feeling, sinus pressure with yellow nasal mucous, generalized body aches and chills.    She is concerned she has strep. PMH is listed.         Review of Systems   Review of Systems   Constitutional:  Positive for chills.   HENT:  Positive for congestion, sinus pressure, sinus pain and sore throat.    Eyes: Negative.    Respiratory: Negative.     Cardiovascular: Negative.    Gastrointestinal: Negative.    Endocrine: Negative.    Genitourinary: Negative.    Musculoskeletal:  Positive for myalgias.   Skin: Negative.    Allergic/Immunologic: Negative.    Neurological: Negative.    Hematological: Negative.    Psychiatric/Behavioral: Negative.           Current Medications       Current Outpatient Medications:     albuterol (PROVENTIL HFA,VENTOLIN HFA) 90 mcg/act inhaler, Inhale 2 puffs every 6 (six) hours as needed for wheezing, Disp: 18 g, Rfl: 0    amoxicillin-clavulanate (AUGMENTIN) 875-125 mg per tablet, Take 1 tablet by mouth every 12 (twelve) hours for 10 days, Disp: 20 tablet, Rfl: 0    fluticasone (FLONASE) 50 mcg/act nasal spray, 2 sprays into each nostril daily, Disp: 16 g, Rfl: 3    ibuprofen (MOTRIN) 600 mg tablet, Take 1 tablet (600 mg total) by mouth every 8 (eight) hours as needed for moderate pain, Disp: 30 tablet, Rfl: 1    Magnesium Oxide 400 MG CAPS, Take 1 tablet 1 to 2 times daily with onset of menstrual symptoms, Disp: , Rfl: 0    scopolamine (TRANSDERM-SCOP) 1 mg/3 days TD 72 hr patch, Place 1 patch on the skin over 72 hours every third day, Disp:  10 patch, Rfl: 3    EPINEPHrine (EPIPEN) 0.3 mg/0.3 mL SOAJ, Inject 0.3 mL (0.3 mg total) into a muscle once for 1 dose PRN bee sting, Disp: 0.6 mL, Rfl: 0    meclizine (ANTIVERT) 25 mg tablet, Take 1 tablet (25 mg total) by mouth 3 (three) times a day as needed for dizziness (Patient taking differently: Take 25 mg by mouth 3 (three) times a day as needed for dizziness prn), Disp: 30 tablet, Rfl: 0    Current Allergies     Allergies as of 06/24/2024 - Reviewed 06/24/2024   Allergen Reaction Noted    Bee venom Anaphylaxis 01/31/2024    Food Facial Swelling 03/13/2024            The following portions of the patient's history were reviewed and updated as appropriate: allergies, current medications, past family history, past medical history, past social history, past surgical history and problem list.     Past Medical History:   Diagnosis Date    Abnormal Pap smear of cervix     Allergic rhinitis     Resolved 7/14/2017     Anxiety     Generalized anxiety disorder     Resolved 7/14/2017        Past Surgical History:   Procedure Laterality Date    COLPOSCOPY      NO PAST SURGERIES         Family History   Problem Relation Age of Onset    Cancer Mother     Colon cancer Mother     Colon polyps Mother     Uterine cancer Mother     Heart disease Father     No Known Problems Sister     No Known Problems Daughter     No Known Problems Daughter     Colon cancer Maternal Grandmother     No Known Problems Paternal Grandmother     Colon cancer Cousin     No Known Problems Maternal Aunt     No Known Problems Maternal Aunt     No Known Problems Maternal Aunt          Medications have been verified.        Objective   /90   Pulse 79   Temp (!) 97.3 °F (36.3 °C)   Resp 20   Wt 94.3 kg (208 lb)   SpO2 99%   BMI 29.84 kg/m²   No LMP recorded.       Physical Exam     Physical Exam  Vitals and nursing note reviewed.   Constitutional:       General: She is not in acute distress.     Appearance: Normal appearance. She is obese.  She is not ill-appearing, toxic-appearing or diaphoretic.   HENT:      Head: Normocephalic and atraumatic.      Right Ear: Tympanic membrane and ear canal normal.      Left Ear: Tympanic membrane and ear canal normal.      Nose: Congestion present. No rhinorrhea.      Mouth/Throat:      Lips: Pink.      Mouth: Mucous membranes are moist.      Pharynx: Posterior oropharyngeal erythema present. No oropharyngeal exudate.      Comments: Posterior oropharyngeal erythema with edema   Eyes:      Extraocular Movements: Extraocular movements intact.   Cardiovascular:      Rate and Rhythm: Normal rate and regular rhythm.      Pulses: Normal pulses.      Heart sounds: Normal heart sounds. No murmur heard.  Pulmonary:      Effort: Pulmonary effort is normal. No respiratory distress.      Breath sounds: Normal breath sounds. No stridor. No wheezing, rhonchi or rales.   Chest:      Chest wall: No tenderness.   Musculoskeletal:         General: Normal range of motion.      Cervical back: Normal range of motion. No rigidity or tenderness.   Lymphadenopathy:      Cervical: Cervical adenopathy present.   Skin:     General: Skin is warm and dry.      Capillary Refill: Capillary refill takes less than 2 seconds.   Neurological:      General: No focal deficit present.      Mental Status: She is alert and oriented to person, place, and time.   Psychiatric:         Mood and Affect: Mood normal.         Behavior: Behavior normal.         Thought Content: Thought content normal.         Judgment: Judgment normal.

## 2024-06-24 NOTE — PATIENT INSTRUCTIONS
You have been prescribed augmentin for sinus infection and sorethroat.   You have been prescribed an antibiotic - you are to take an oral probiotic and eat yogurt to avoid GI issues/diarrhea.  Your strep A is negative. You have a throat culture pending. You are to download  [facility] for the results in 3-4 days.  You will be notified if the results are + You are to do warm salt water gargles 4 x daily.  Drink warm tea with honey and lemon.  Take tylenol or motrin as able for pain or fever.  Chloraseptic throat spray, cough drops.  Do not share utensils.  Change your tooth brush in 3 days.  Follow up with your PCP in 2-3 days  Go to the ED if symptoms worsen      If tests have been performed at [facility], our office will contact you with results if changes need to be made to the care plan discussed with you at the visit.  You can review your full results on [facility].

## 2024-06-26 LAB — BACTERIA THROAT CULT: NORMAL

## 2024-07-22 ENCOUNTER — OFFICE VISIT (OUTPATIENT)
Dept: BARIATRICS | Facility: CLINIC | Age: 49
End: 2024-07-22
Payer: COMMERCIAL

## 2024-07-22 ENCOUNTER — APPOINTMENT (OUTPATIENT)
Dept: LAB | Facility: MEDICAL CENTER | Age: 49
End: 2024-07-22

## 2024-07-22 VITALS
HEART RATE: 73 BPM | BODY MASS INDEX: 29.63 KG/M2 | TEMPERATURE: 98.3 F | WEIGHT: 207 LBS | OXYGEN SATURATION: 98 % | DIASTOLIC BLOOD PRESSURE: 86 MMHG | HEIGHT: 70 IN | SYSTOLIC BLOOD PRESSURE: 118 MMHG

## 2024-07-22 DIAGNOSIS — Z00.8 HEALTH EXAMINATION IN POPULATION SURVEY: ICD-10-CM

## 2024-07-22 DIAGNOSIS — E66.3 OVERWEIGHT: Primary | ICD-10-CM

## 2024-07-22 DIAGNOSIS — R73.03 PREDIABETES: ICD-10-CM

## 2024-07-22 LAB
CHOLEST SERPL-MCNC: 202 MG/DL
EST. AVERAGE GLUCOSE BLD GHB EST-MCNC: 120 MG/DL
HBA1C MFR BLD: 5.8 %
HDLC SERPL-MCNC: 45 MG/DL
LDLC SERPL CALC-MCNC: 124 MG/DL (ref 0–100)
NONHDLC SERPL-MCNC: 157 MG/DL
TRIGL SERPL-MCNC: 165 MG/DL

## 2024-07-22 PROCEDURE — 83036 HEMOGLOBIN GLYCOSYLATED A1C: CPT

## 2024-07-22 PROCEDURE — 36415 COLL VENOUS BLD VENIPUNCTURE: CPT

## 2024-07-22 PROCEDURE — 80061 LIPID PANEL: CPT

## 2024-07-22 PROCEDURE — 99244 OFF/OP CNSLTJ NEW/EST MOD 40: CPT | Performed by: PHYSICIAN ASSISTANT

## 2024-07-22 NOTE — PROGRESS NOTES
Assessment/Plan:    Overweight  -Discussed options of HealthyCORE-Intensive Lifestyle Intervention Program, Very Low Calorie Diet-VLCD, and Conservative Program and the role of weight loss medications.  -Initial weight loss goal of 5-10% weight loss for improved health  -Screening labs  -Patient is interested in pursuing Conservative Program  -AVOID Topamax- not using contraception   Denies hx of CAD, PAD, glaucoma, palpitations or arrhythmia.  Phentermine has as potential side effects of increased heart rate, increased blood pressure, palpitations, headache, dizziness, insomnia, altered mood, abdominal upset, and dry mouth. Notify the provider with change in mood. ER with any thoughts of harming yourself or others. Phentermine should be stopped prior to surgery. Notify the provider with any changes in vision  Reviewed with the pt   -Calorie goals, sample menu, portion size guidelines, and food logging reviewed with the patient.   -Discussed AOM options. She is interested in phentermine. ECG ordered. Can consider metformin.     Follow up in 1 month with RD and 3 months for vital and approximately  5-6 months  with Non-Surgical Physician/Advanced Practitioner.    Goals:  Food log (ie.) www.bepretty.com,sparkpeople.com,loseit.com,Calpano.com,etc. baritastic  No sugary beverages. At least 64oz of water daily.  Increase physical activity by 10 minutes daily. Gradually increase physical activity to a goal of 5 days per week for 30 minutes of MODERATE intensity PLUS 2 days per week of FULL BODY resistance training  5-10 servings of fruits and vegetables per day and 25-35 grams of dietary fiber per day, gradually increasing  9486-8304 calories    Diagnoses and all orders for this visit:    Overweight  -     Ambulatory referral to Weight Management  -     ECG 12 lead; Future    Body mass index 29.0-29.9, adult    Prediabetes  Comments:  -discussed option for metformin, she prefers to start with  "phentermine  Orders:  -     ECG 12 lead; Future        Subjective:   Chief Complaint   Patient presents with    Consult     Patient ID: Heather Reaves  is a 49 y.o. female with excess weight/obesity here to pursue weight management.  Past Medical History:   Diagnosis Date    Abnormal Pap smear of cervix     Allergic rhinitis     Resolved 2017     Anxiety     Generalized anxiety disorder     Resolved 2017      HPI:  Obesity/Excess Weight:  Severity: Mild  Onset:  few years    Modifiers:  no previous attempts  Contributing factors: Poor Food Choices and portion sizes; craves sweets and salty  Associated symptoms: increased joint pain and decreased exercise capacity, body image    Goals: 165  Hydration: 4-5 bottles of water, drinks iced tea   Alcohol: monthly/special occasions (3-4 drinks)   Exercise: active as tends to farm   Dining out: daily  Occupation: MA for [employer], also has a farm  STOPBAN/8    B: 1 cup of hot tea with honey; 2 eggs fried in lard + meat   S:  muffin + fruit   L: orders out: burger/fries   S: yes- sweet (usually hungry)  D: protein + starch + veg   S: sweets    The following portions of the patient's history were reviewed and updated as appropriate: allergies, current medications, past family history, past medical history, past social history, past surgical history, and problem list.    Review of Systems   Constitutional:  Negative for chills and fever.   HENT:  Negative for sore throat.    Cardiovascular:  Negative for chest pain and palpitations.   Gastrointestinal:  Negative for constipation and diarrhea.   Genitourinary:  Negative for dysuria.   Musculoskeletal:  Positive for back pain.   Skin:  Negative for rash.   Neurological:  Negative for headaches.   Psychiatric/Behavioral: Negative.       Objective:    /86 (BP Location: Left arm, Cuff Size: Standard)   Pulse 73   Temp 98.3 °F (36.8 °C)   Ht 5' 10\" (1.778 m)   Wt 93.9 kg (207 lb)   SpO2 98%   BMI 29.70 " kg/m²     Physical Exam  Vitals and nursing note reviewed.     Constitutional   General appearance: Abnormal.  well developed and obese.  Pulmonary   Respiratory effort: No increased work of breathing or signs of respiratory distress.    Abdomen   Abdomen: Abnormal.  The abdomen was obese.   Musculoskeletal   Gait and station: Normal.    Psychiatric   Orientation to person, place and time: Normal.    Affect: appropriate    40 minute visit, >50% time spent counseling patient on nonsurgical interventions for the treatment of excess weight. Discussed in detail nonsurgical options including intensive lifestyle intervention program, very low-calorie diet program and conservative program.  Discussed the role of weight loss medications.  Counseled patient on diet behavior and exercise modification for weight loss.

## 2024-07-22 NOTE — PATIENT INSTRUCTIONS
If choosing starch pick whole grain/complex carbs and keep to 1/2 cup: oatmeal, brown rice, quinoa, whole wheat pasta, potatoes, sweet potato, chickpea noodles, red lentil noodles  Measure all portions: creamers, sugars, cooking oils/butters, condiments, dressings, etc and log calories (1tbsp of butter = 140 calories)  9451-9790 calories  Pre-plan a snack for end of work/ride home    While on phentermine check your resting blood pressure and pulse at least 3 times per week. For example you may do this Monday morning, Wednesday afternoon, and Friday night. Your blood pressure should not be 140/90 or higher and goal pulse goal  bpm (beats per minute.) Notify the provider if either are consistently elevated

## 2024-07-22 NOTE — ASSESSMENT & PLAN NOTE
-Discussed options of HealthyCORE-Intensive Lifestyle Intervention Program, Very Low Calorie Diet-VLCD, and Conservative Program and the role of weight loss medications.  -Initial weight loss goal of 5-10% weight loss for improved health  -Screening labs  -Patient is interested in pursuing Conservative Program  -AVOID Topamax- not using contraception   Denies hx of CAD, PAD, glaucoma, palpitations or arrhythmia.  Phentermine has as potential side effects of increased heart rate, increased blood pressure, palpitations, headache, dizziness, insomnia, altered mood, abdominal upset, and dry mouth. Notify the provider with change in mood. ER with any thoughts of harming yourself or others. Phentermine should be stopped prior to surgery. Notify the provider with any changes in vision  Reviewed with the pt   -Calorie goals, sample menu, portion size guidelines, and food logging reviewed with the patient.

## 2024-07-27 ENCOUNTER — OFFICE VISIT (OUTPATIENT)
Dept: LAB | Facility: HOSPITAL | Age: 49
End: 2024-07-27
Payer: COMMERCIAL

## 2024-07-27 DIAGNOSIS — E66.3 OVERWEIGHT: ICD-10-CM

## 2024-07-27 DIAGNOSIS — R73.03 PREDIABETES: ICD-10-CM

## 2024-07-27 PROCEDURE — 93005 ELECTROCARDIOGRAM TRACING: CPT

## 2024-07-29 ENCOUNTER — TELEPHONE (OUTPATIENT)
Dept: BARIATRICS | Facility: CLINIC | Age: 49
End: 2024-07-29

## 2024-07-29 DIAGNOSIS — E66.3 OVERWEIGHT: Primary | ICD-10-CM

## 2024-07-29 LAB
ATRIAL RATE: 74 BPM
P AXIS: 79 DEGREES
PR INTERVAL: 140 MS
QRS AXIS: 77 DEGREES
QRSD INTERVAL: 82 MS
QT INTERVAL: 392 MS
QTC INTERVAL: 435 MS
T WAVE AXIS: 70 DEGREES
VENTRICULAR RATE: 74 BPM

## 2024-07-29 PROCEDURE — 93010 ELECTROCARDIOGRAM REPORT: CPT | Performed by: INTERNAL MEDICINE

## 2024-07-29 RX ORDER — PHENTERMINE HYDROCHLORIDE 37.5 MG/1
18.75 TABLET ORAL EVERY MORNING
Qty: 15 TABLET | Refills: 1 | Status: SHIPPED | OUTPATIENT
Start: 2024-07-29

## 2024-08-05 NOTE — TELEPHONE ENCOUNTER
Patient called in regarding message received. She is ready to schedule her nurse visit for November. She states that she can be scheduled for early morning or early afternoon on any Monday and to call her and leave her a message regarding what date and time she is scheduled for as she won't be available to answer the phone. Please call her back at [phone]

## 2024-09-16 ENCOUNTER — OFFICE VISIT (OUTPATIENT)
Dept: FAMILY MEDICINE CLINIC | Facility: CLINIC | Age: 49
End: 2024-09-16
Payer: COMMERCIAL

## 2024-09-16 VITALS
DIASTOLIC BLOOD PRESSURE: 84 MMHG | HEIGHT: 70 IN | HEART RATE: 84 BPM | WEIGHT: 201.4 LBS | TEMPERATURE: 97.4 F | BODY MASS INDEX: 28.83 KG/M2 | OXYGEN SATURATION: 98 % | SYSTOLIC BLOOD PRESSURE: 118 MMHG

## 2024-09-16 DIAGNOSIS — E66.3 OVERWEIGHT: ICD-10-CM

## 2024-09-16 DIAGNOSIS — J30.1 NON-SEASONAL ALLERGIC RHINITIS DUE TO POLLEN: ICD-10-CM

## 2024-09-16 DIAGNOSIS — Z86.010 HISTORY OF COLON POLYPS: ICD-10-CM

## 2024-09-16 DIAGNOSIS — K21.00 GASTROESOPHAGEAL REFLUX DISEASE WITH ESOPHAGITIS WITHOUT HEMORRHAGE: ICD-10-CM

## 2024-09-16 DIAGNOSIS — L21.9 SEBORRHEIC DERMATITIS: ICD-10-CM

## 2024-09-16 DIAGNOSIS — Z00.00 WELL ADULT EXAM: Primary | ICD-10-CM

## 2024-09-16 DIAGNOSIS — M54.50 LEFT LOW BACK PAIN, UNSPECIFIED CHRONICITY, UNSPECIFIED WHETHER SCIATICA PRESENT: ICD-10-CM

## 2024-09-16 DIAGNOSIS — Z91.030 ALLERGY TO HONEY BEE VENOM: ICD-10-CM

## 2024-09-16 PROCEDURE — 99396 PREV VISIT EST AGE 40-64: CPT | Performed by: FAMILY MEDICINE

## 2024-09-16 RX ORDER — IBUPROFEN 600 MG/1
600 TABLET, FILM COATED ORAL EVERY 8 HOURS PRN
Qty: 90 TABLET | Refills: 1 | Status: SHIPPED | OUTPATIENT
Start: 2024-09-16 | End: 2024-09-16 | Stop reason: SDUPTHER

## 2024-09-16 RX ORDER — IBUPROFEN 600 MG/1
600 TABLET, FILM COATED ORAL EVERY 8 HOURS PRN
Qty: 90 TABLET | Refills: 1 | Status: SHIPPED | OUTPATIENT
Start: 2024-09-16

## 2024-09-16 RX ORDER — KETOCONAZOLE 20 MG/ML
1 SHAMPOO TOPICAL 2 TIMES WEEKLY
Qty: 120 ML | Refills: 5 | Status: SHIPPED | OUTPATIENT
Start: 2024-09-16 | End: 2024-12-27

## 2024-09-16 NOTE — ASSESSMENT & PLAN NOTE
Orders:    ketoconazole (NIZORAL) 2 % shampoo; Apply 1 Application topically 2 (two) times a week for 30 doses    Ambulatory Referral to Dermatology; Future

## 2024-09-16 NOTE — PROGRESS NOTES
Ambulatory Visit  Name: Heather Reaves      : 1975      MRN: [id number]  Encounter Provider: Carlos Ruano DO  Encounter Date: 2024   Encounter department: [facility]    Assessment & Plan  Well adult exam         Non-seasonal allergic rhinitis due to pollen         Gastroesophageal reflux disease with esophagitis without hemorrhage         Overweight         History of colon polyps         Allergy to honey bee venom         Seborrheic dermatitis    Orders:    ketoconazole (NIZORAL) 2 % shampoo; Apply 1 Application topically 2 (two) times a week for 30 doses    Ambulatory Referral to Dermatology; Future    Left low back pain, unspecified chronicity, unspecified whether sciatica present    Orders:    ibuprofen (MOTRIN) 600 mg tablet; Take 1 tablet (600 mg total) by mouth every 8 (eight) hours as needed for moderate pain       History of Present Illness     The patient is a very pleasant 49-year-old female who works at [employer].  She presents for her annual physical examination.  The patient does request a referral to dermatology for a complete skin exam.  She also has a history of seborrheic dermatitis and capitis.  I renewed the ketoconazole shampoo.    The patient has a history of colon polyps and her repeat colonoscopy is due in .  Patient has had her mammogram this year which was unremarkable.  Patient and her  on a farm as well.    I reviewed the patient's past medical history, past surgical history medication list allergies and social history.  I have done my best to review past lab work, imaging studies and consultations.    The patient is also seeing weight management who has just prescribed phentermine.  No palpitations.  Blood pressure and heart rate are satisfactory.  Patient does believe this is helping.          Review of Systems   Constitutional:  Negative for chills and fever.   HENT:  Negative for ear pain and sore throat.    Eyes:  Negative  "for pain and visual disturbance.   Respiratory:  Negative for cough and shortness of breath.    Cardiovascular:  Negative for chest pain and palpitations.   Gastrointestinal:  Negative for abdominal pain and vomiting.   Genitourinary:  Negative for dysuria and hematuria.   Musculoskeletal:  Negative for arthralgias and back pain.   Skin:  Negative for color change and rash.   Neurological:  Negative for seizures and syncope.   All other systems reviewed and are negative.          Objective     /84   Pulse 84   Temp (!) 97.4 °F (36.3 °C)   Ht 5' 10\" (1.778 m)   Wt 91.4 kg (201 lb 6.4 oz)   SpO2 98%   BMI 28.90 kg/m²     Physical Exam  Vitals and nursing note reviewed.   Constitutional:       General: She is not in acute distress.     Appearance: She is well-developed.   HENT:      Head: Normocephalic and atraumatic.   Eyes:      Conjunctiva/sclera: Conjunctivae normal.   Cardiovascular:      Rate and Rhythm: Normal rate and regular rhythm.      Heart sounds: No murmur heard.  Pulmonary:      Effort: Pulmonary effort is normal. No respiratory distress.      Breath sounds: Normal breath sounds.   Abdominal:      Palpations: Abdomen is soft.      Tenderness: There is no abdominal tenderness.   Musculoskeletal:         General: No swelling.      Cervical back: Neck supple.   Skin:     General: Skin is warm and dry.      Capillary Refill: Capillary refill takes less than 2 seconds.   Neurological:      Mental Status: She is alert.   Psychiatric:         Mood and Affect: Mood normal.         "

## 2024-09-19 NOTE — PROGRESS NOTES
Weight Management Medical Nutrition Assessment  Heather presented today for meal-planning session and vitals check. She is pursuing conservative program. Today she weighed 199.5# which reflects a loss of 7.5# since 7/22/24 appointment with [facility] provider. MA obtained vitals of which RD notified provider.  Pertinent documented medical hx includes GERD and prediabetes. Abnormal labs values 7/22/4 include cholesterol 202, , HDL 45, . Relevant medications include phentermine. Heather noted she will need a refill; notified provider. Stated hx of slow and steady weight gain. 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 However, she finds it difficult to find time to walk for exercise. 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 Feels phentermine is helping reduce her cravings. Was on vacation 9/9-9/14 so ate things she normally has not been. Has been watching her portion sizes. Not food-logging. Admitted to not preparing lunch. Discussed the importance of planning. Provided Heather with and reviewed meal-planning session materials including calorie-controlled, balanced meal plan. RD follow-up scheduled for November.       Patient seen by Medical Provider in past 6 months:  yes  Requested to schedule appointment with Medical Provider: No      Anthropometric Measurements  Start Weight (#): 207#   Current Weight (#): 199.5 #  TBW % Change from start weight:3.6%  Ideal Body Weight (#):150# (68.2 kg)  Goal Weight (#):165#  Highest:212#   Lowest:165#    Weight Loss History  Previous weight loss attempts: none identified     Food and Nutrition Related History  Wake up: 6 AM   Bed Time:10:30 PM; generally sleeps well; no night-time eating    Food Recall  Breakfast:6:30-6:45 AM-  makes breakfast; 1 fried egg, piece of meat such as dawson or scrapple, 1 slice toast with butter      Snack:9 AM- Greek yogurt, carrot chips and  christophe  Lunch:12:00 PM- orders out from [employer] or chicken salad from diner or may have burger and shares fries  Snack: wants to start eating something on ride home to prevent her from eating when she gets home; currently eating snack ~5:30 PM- crackers or salsa/chips, or dessert    Dinner: 6:15-6:30 PM- meat, vegetable, starch, sometimes side salad   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- half of a TastyKake      Beverages: water- 3-4 bottles, hot tea- 12 oz with honey, no coffe, no soda, ETOH occ only socially  Volume of beverage intake: >=63 oz    Weekends: usually not, but due to preparing her home for sale, it is less regimented, eating quicker things   Cravings: occ struggles with sweets, but thinks this is more of a habit  Trouble area of day:when gets home from work    Frequency of Eating out: cut back from 5 days per week to 3x/week  Food restrictions:stated she has allergies such as wheat and peanut, but noted she has no reaction despite having a panel done  Cooking: self   Food Shopping: self    Physical Activity Intake  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, preparing house for sale  Frequency:daily  Physical limitations/barriers to exercise: none    Estimated Needs  Energy  SECA: BMR:n/a      X 1.3 -1000 =  [address] [employer] Needs: BMR: 1610   1-2# loss weekly sedentary:  932-1432             1-2# loss weekly lightly active:0515-9879  Maintenance calories for sedentary activity level: 1932  Protein: g      (1.2-1.5g/kg IBW)  Fluid: >=80 oz     (35mL/kg IBW)    Nutrition Diagnosis  Yes;    Overweight/obesity  related to Excess energy intake as evidenced by  BMI more than normative standard for age and sex (overweight 25-29.9)       Nutrition Intervention    Nutrition Prescription  Calories:3342-6215  Protein: g  Fluid:>= 80 oz    Meal Plan (Omkar/Pro/Carb)  Provided Heather with 2394-1653 kcal sample meal plan.     Diet Education:      Calorie controlled menu  Lean protein  food choices  Healthy snack options  Food journaling tips      Nutrition Counseling:  Strategies: meal planning, portion sizes, healthy snack choices, hydration, fiber intake, protein intake, exercise, food journal      Monitoring and Evaluation:  Evaluation criteria:  Energy Intake  Meet protein needs  Maintain adequate hydration  Monitor weekly weight  Meal planning/preparation  Food journal   Decreased portions at mealtimes and snacks  Physical activity     Barriers to learning:none  Readiness to change: Action:  (Changing behavior)  Comprehension: very good  Expected Compliance: very good

## 2024-09-23 ENCOUNTER — CLINICAL SUPPORT (OUTPATIENT)
Age: 49
End: 2024-09-23
Payer: COMMERCIAL

## 2024-09-23 VITALS
DIASTOLIC BLOOD PRESSURE: 80 MMHG | HEIGHT: 70 IN | BODY MASS INDEX: 28.56 KG/M2 | WEIGHT: 199.5 LBS | OXYGEN SATURATION: 98 % | TEMPERATURE: 98.1 F | SYSTOLIC BLOOD PRESSURE: 132 MMHG | HEART RATE: 101 BPM

## 2024-09-23 DIAGNOSIS — E66.3 OVERWEIGHT WITH BODY MASS INDEX (BMI) OF 28 TO 28.9 IN ADULT: Primary | ICD-10-CM

## 2024-09-23 PROCEDURE — WMDI30

## 2024-09-23 PROCEDURE — RECHECK

## 2024-09-24 ENCOUNTER — TELEPHONE (OUTPATIENT)
Age: 49
End: 2024-09-24

## 2024-09-24 DIAGNOSIS — E66.3 OVERWEIGHT: ICD-10-CM

## 2024-09-24 RX ORDER — PHENTERMINE HYDROCHLORIDE 37.5 MG/1
18.75 TABLET ORAL EVERY MORNING
Qty: 15 TABLET | Refills: 1 | Status: SHIPPED | OUTPATIENT
Start: 2024-09-24

## 2024-09-25 ENCOUNTER — TELEPHONE (OUTPATIENT)
Dept: BARIATRICS | Facility: CLINIC | Age: 49
End: 2024-09-25

## 2024-09-25 ENCOUNTER — TELEPHONE (OUTPATIENT)
Age: 49
End: 2024-09-25

## 2024-09-25 NOTE — TELEPHONE ENCOUNTER
----- Message from Yeni BENITEZ sent at 9/23/2024 10:29 PM EDT -----  Regarding: vitals and med refill request  Melanie:    Zoya's vitals today:    Temp 98    O2 98  /80    Also, she stated that she will need phentermine refilled.     Thank you,     Yeni

## 2024-09-25 NOTE — TELEPHONE ENCOUNTER
Patient of Ladonna Duarte PA-C. Returning call that she received regarding the Phentermine and that she would need to pay $15.00 out of pocket for prescription. She wants to continue medication and is fine with paying cost. Attempted to warm transfer twice to office as patient requested to speak to someone. Both time call was disconnected.

## 2024-10-29 ENCOUNTER — TELEPHONE (OUTPATIENT)
Dept: BARIATRICS | Facility: CLINIC | Age: 49
End: 2024-10-29

## 2024-10-30 ENCOUNTER — TELEPHONE (OUTPATIENT)
Age: 49
End: 2024-10-30

## 2024-10-30 NOTE — TELEPHONE ENCOUNTER
Patient called in to say she is paying out of pocket for her phentermine but the pharmacy won't let her pick it up until the prior auth is approved/denied.  She is asking if there is any way around this?  Please reach out to her either by phone or ActivePatht message.  Thanks.

## 2024-11-01 ENCOUNTER — TELEPHONE (OUTPATIENT)
Age: 49
End: 2024-11-01

## 2024-11-02 ENCOUNTER — OFFICE VISIT (OUTPATIENT)
Dept: URGENT CARE | Facility: MEDICAL CENTER | Age: 49
End: 2024-11-02
Payer: COMMERCIAL

## 2024-11-02 VITALS
BODY MASS INDEX: 28.41 KG/M2 | RESPIRATION RATE: 16 BRPM | DIASTOLIC BLOOD PRESSURE: 78 MMHG | TEMPERATURE: 97.9 F | HEART RATE: 71 BPM | OXYGEN SATURATION: 98 % | SYSTOLIC BLOOD PRESSURE: 128 MMHG | WEIGHT: 198 LBS

## 2024-11-02 DIAGNOSIS — J01.90 ACUTE SINUSITIS, RECURRENCE NOT SPECIFIED, UNSPECIFIED LOCATION: Primary | ICD-10-CM

## 2024-11-02 PROCEDURE — 99213 OFFICE O/P EST LOW 20 MIN: CPT

## 2024-11-02 NOTE — PROGRESS NOTES
[facility]        NAME: Heather Reaves is a 49 y.o. female  : 1975    MRN: [id number]  DATE: 2024  TIME: 5:21 PM    Assessment and Plan   Acute sinusitis, recurrence not specified, unspecified location [J01.90]  1. Acute sinusitis, recurrence not specified, unspecified location  amoxicillin-clavulanate (AUGMENTIN) 875-125 mg per tablet            Patient Instructions       Follow up with PCP in 3-5 days.  Proceed to  ER if symptoms worsen.    If tests are performed, our office will contact you with results only if changes need to made to the care plan discussed with you at the visit. You can review your full results on [facility].    Chief Complaint     Chief Complaint   Patient presents with    Facial Pain     Past 5 days has had pain in face around eyes and sinuses, started 3 weeks ago worse this week.         History of Present Illness       Onset x3 weeks ago with sinus congestion. Over the past 5 days has not started with worsening of symptoms including pain in her face and around her eyes and sinuses. Swelling of the face in the area of her sinuses noted. Patient has not had any fevers or chills. At home taking OTC medicines without improvement. 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 She has also had a lingering cough. She has had increased pain in her face and even into her jaw which has been waking her up at night time.         Review of Systems   Review of Systems   Constitutional:  Positive for chills. Negative for fatigue and fever.   HENT:  Positive for congestion, ear pain, facial swelling, postnasal drip, rhinorrhea, sinus pressure and sinus pain. Negative for sore throat and trouble swallowing.    Respiratory:  Positive for cough. Negative for chest tightness and shortness of breath.    Cardiovascular:  Negative for chest pain and palpitations.   Gastrointestinal:  Positive for nausea. Negative for abdominal pain, constipation,  diarrhea and vomiting.   Musculoskeletal:  Negative for arthralgias and back pain.   Skin:  Negative for color change and rash.   Neurological:  Positive for headaches. Negative for dizziness and light-headedness.   All other systems reviewed and are negative.        Current Medications       Current Outpatient Medications:     albuterol (PROVENTIL HFA,VENTOLIN HFA) 90 mcg/act inhaler, Inhale 2 puffs every 6 (six) hours as needed for wheezing, Disp: 18 g, Rfl: 0    amoxicillin-clavulanate (AUGMENTIN) 875-125 mg per tablet, Take 1 tablet by mouth every 12 (twelve) hours for 7 days, Disp: 14 tablet, Rfl: 0    dextromethorphan-guaifenesin (MUCINEX DM)  MG per 12 hr tablet, Take 1 tablet by mouth every 12 (twelve) hours, Disp: , Rfl:     fluticasone (FLONASE) 50 mcg/act nasal spray, 2 sprays into each nostril daily, Disp: 16 g, Rfl: 3    ibuprofen (MOTRIN) 600 mg tablet, Take 1 tablet (600 mg total) by mouth every 8 (eight) hours as needed for moderate pain, Disp: 90 tablet, Rfl: 1    ketoconazole (NIZORAL) 2 % shampoo, Apply 1 Application topically 2 (two) times a week for 30 doses, Disp: 120 mL, Rfl: 5    Magnesium Oxide 400 MG CAPS, Take 1 tablet 1 to 2 times daily with onset of menstrual symptoms, Disp: , Rfl: 0    phentermine (ADIPEX-P) 37.5 MG tablet, Take 0.5 tablets (18.75 mg total) by mouth every morning Before breakfast or 1-2 hours after, Disp: 15 tablet, Rfl: 1    EPINEPHrine (EPIPEN) 0.3 mg/0.3 mL SOAJ, Inject 0.3 mL (0.3 mg total) into a muscle once for 1 dose PRN bee sting, Disp: 0.6 mL, Rfl: 0    Current Allergies     Allergies as of 11/02/2024 - Reviewed 11/02/2024   Allergen Reaction Noted    Bee venom Anaphylaxis 01/31/2024    Food Facial Swelling 03/13/2024            The following portions of the patient's history were reviewed and updated as appropriate: allergies, current medications, past family history, past medical history, past social history, past surgical history and problem list.      Past Medical History:   Diagnosis Date    Abnormal Pap smear of cervix     Allergic rhinitis     Resolved 7/14/2017     Anxiety     Generalized anxiety disorder     Resolved 7/14/2017        Past Surgical History:   Procedure Laterality Date    COLPOSCOPY      NO PAST SURGERIES         Family History   Problem Relation Age of Onset    Cancer Mother     Colon cancer Mother     Colon polyps Mother     Uterine cancer Mother     Heart disease Father     No Known Problems Sister     No Known Problems Daughter     No Known Problems Daughter     Colon cancer Maternal Grandmother     No Known Problems Paternal Grandmother     Colon cancer Cousin     No Known Problems Maternal Aunt     No Known Problems Maternal Aunt     No Known Problems Maternal Aunt          Medications have been verified.        Objective   /78 (BP Location: Left arm, Patient Position: Sitting, Cuff Size: Standard)   Pulse 71   Temp 97.9 °F (36.6 °C) (Temporal)   Resp 16   Wt 89.8 kg (198 lb)   LMP 10/22/2024 (Exact Date)   SpO2 98%   BMI 28.41 kg/m²        Physical Exam     Physical Exam  Vitals and nursing note reviewed.   Constitutional:       General: She is awake. She is not in acute distress.     Appearance: Normal appearance. She is well-developed and normal weight. She is ill-appearing.   HENT:      Head: Normocephalic and atraumatic.      Right Ear: Tympanic membrane, ear canal and external ear normal. Tympanic membrane is not erythematous or bulging.      Left Ear: Tympanic membrane, ear canal and external ear normal. Tympanic membrane is not erythematous or bulging.      Nose: Congestion and rhinorrhea present. Rhinorrhea is clear.      Right Sinus: Maxillary sinus tenderness present.      Left Sinus: Maxillary sinus tenderness present.      Comments: Patient appears to have puffiness around the area of the sinuses.        Mouth/Throat:      Lips: Pink.      Mouth: Mucous membranes are moist.      Pharynx: Oropharynx is clear.  Uvula midline. Posterior oropharyngeal erythema and postnasal drip present. No oropharyngeal exudate.   Eyes:      Extraocular Movements: Extraocular movements intact.      Conjunctiva/sclera: Conjunctivae normal.      Pupils: Pupils are equal, round, and reactive to light.   Cardiovascular:      Rate and Rhythm: Normal rate and regular rhythm.      Pulses: Normal pulses.      Heart sounds: Normal heart sounds.   Pulmonary:      Effort: Pulmonary effort is normal.      Breath sounds: Normal breath sounds.   Abdominal:      General: Abdomen is flat. Bowel sounds are normal.      Palpations: Abdomen is soft.   Musculoskeletal:         General: Normal range of motion.      Cervical back: Full passive range of motion without pain, normal range of motion and neck supple.   Skin:     General: Skin is warm and dry.      Capillary Refill: Capillary refill takes less than 2 seconds.      Findings: No rash.   Neurological:      General: No focal deficit present.      Mental Status: She is alert and oriented to person, place, and time. Mental status is at baseline.   Psychiatric:         Mood and Affect: Mood normal.         Behavior: Behavior normal. Behavior is cooperative.

## 2024-11-02 NOTE — PATIENT INSTRUCTIONS
You may take over the counter Tylenol (Acetaminophen) and/or Motrin (Ibuprofen) as needed, as directed on packaging.   Be sure to get plenty of rest, and drinking fluids to remain hydrated.     Please follow up with your primary provider in the next several days. Should you have any worsening of symptoms, or lack of improvement please be re-evaluated. If needed for significant concerns, consider 911 or ER evaluation.     Over the counter decongestants can be used, only as directed on the box. However if you have any history of cardiac disease or high blood pressure these should be avoided, as we caution the use of these since they can make place strain on your heart and cause increase in blood pressure. It is recommended in lieu of decongestants to use cool mist vaporizer, saline nasal spray to relieve nasal congestion. It is also important to remain hydrated and drink plenty of fluids (avoiding caffeine and alcohol).     OVER THE COUNTER: Flonase nasal spray or Astepro nasal spray may be appropriate for your symptoms as well. Please follow the directions on the package for use. (Store brand is perfectly fine).   Plain Mucinex is an expectorant medication which will help to relieve the chest congestion, it is important to drink lots of fluids and keep hydrated.

## 2024-11-25 ENCOUNTER — CLINICAL SUPPORT (OUTPATIENT)
Age: 49
End: 2024-11-25
Payer: COMMERCIAL

## 2024-11-25 ENCOUNTER — TELEPHONE (OUTPATIENT)
Age: 49
End: 2024-11-25

## 2024-11-25 VITALS
HEART RATE: 80 BPM | DIASTOLIC BLOOD PRESSURE: 84 MMHG | SYSTOLIC BLOOD PRESSURE: 132 MMHG | TEMPERATURE: 97.7 F | HEIGHT: 70 IN | BODY MASS INDEX: 27.72 KG/M2 | WEIGHT: 193.6 LBS

## 2024-11-25 DIAGNOSIS — E66.3 OVERWEIGHT WITH BODY MASS INDEX (BMI) OF 27 TO 27.9 IN ADULT: Primary | ICD-10-CM

## 2024-11-25 PROCEDURE — S9470 NUTRITIONAL COUNSELING, DIET: HCPCS | Performed by: DIETITIAN, REGISTERED

## 2024-11-25 PROCEDURE — RECHECK: Performed by: DIETITIAN, REGISTERED

## 2024-11-25 NOTE — PROGRESS NOTES
Weight Management Medical Nutrition Assessment  Heather presented today for follow-up meal-planning session and vitals check. She is pursuing conservative program. Today she weighed 193.6# which reflects a loss of 5.9# since 9/23/24 appointment with RD and 31.4# since beginning with weight management.  She stated that she feels that once the holidays are done she may be able to stop taking phentermine; she is nervous about approaching the holidays without it. She also wondered whether she should increase phentermine from a 1/2 pill to a whole pill as she feels it wears off in the evening. Discussed possibly taking phentermine a little later in the morning than she presently taking it;  discussed this also in passing with provider who said this would be OK as would increasing phentermine if needed. Heather also stated that she has been having migraines for which she  will be seeing PCP tomorrow, may discuss use of Topamax as provider noted also may be an option to take.  MA obtained vitals today of which RD notified provider. No RD follow-up scheduled at this time, but she has appointment in January with provider.     Heather noted her portions are better controlled.  She admitted cookies and cakes are most tempting. Started taking lunch to work more often (about 1/2 the time). Would like to start using free weights. Still working on her house project. Recently ordered a treadmill. Discussed breaking exercising into smaller chunks. Heather's goal is to incorporated 15 minutes of exercise along with 5 minutes of free weights 3-4x/week.        Patient seen by Medical Provider in past 6 months:  yes  Requested to schedule appointment with Medical Provider: No      Anthropometric Measurements  Start Weight (#): 207.2# 7/22/24 with [facility] provider  Current Weight (#): 193.6 #  TBW % Change from start weight: 6.5%  Ideal Body Weight (#):150# (68.2 kg)  Goal Weight (#):165#  Highest:212#   Lowest:165#    Weight Loss  History  Previous weight loss attempts: none identified     Food and Nutrition Related History  Wake up: 6 AM   Bed Time:10:30 PM; generally sleeps well; no night-time eating    Food Recall  Breakfast:6:30-6:45 AM-  makes breakfast; 1 fried egg, piece of meat such as dawson or scrapple, 1 slice toast or Eng muffin or wrap; eggs with meat, with butter; having less than in the past      Snack:9 AM- Greek yogurt, carrot chips and hummus  Lunch:12:00 PM- taking lunch 1/2 the time- chicken salad with gray with lettuce, possibly with crackers OR soup OR  may order out from [employer] or chicken salad from diner or may have burger and shares fries; stir-whitaker   Snack: beef jerky possibly on the way home; snacking less often now when she gets home; avoiding sticky bun that were at home; yogurt possibly or raw veggies    Dinner: 6:15-6:30 PM- meat, vegetable, starch, sometimes side salad; sometimes may overeat at time; noted she has been having only 1 slice of pizza on pizza night rather than 2  Snack: not usually       Beverages: water- at least bottles, hot tea- 12 oz with honey, no coffe, no soda, ETOH occ only socially  Volume of beverage intake: >=63 oz    Weekends: usually not, but due to preparing her home for sale, it is less regimented, eating quicker things   Cravings: occ struggles with sweets, but thinks this is more of a habit  Trouble area of day:when gets home from work    Frequency of Eating out: cut back from 5 days per week to 3x/week  Food restrictions:stated she has allergies such as wheat and peanut, but noted she has no reaction despite having a panel done  Cooking: self   Food Shopping: self    Physical Activity Intake  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, preparing house for sale, but no formal exercise (recently ordered treadmill)  Frequency:daily  Physical limitations/barriers to exercise: none    Estimated Needs  Energy  SECA: BMR:n/a      X 1.3 -1000 =  Gamaliel Finley Energy Needs: BMR: 1610    1-2# loss weekly sedentary:  932-1432             1-2# loss weekly lightly active:0821-7230  Maintenance calories for sedentary activity level: 1932  Protein: g      (1.2-1.5g/kg IBW)  Fluid: >=80 oz     (35mL/kg IBW)    Nutrition Diagnosis  Yes;    Overweight/obesity  related to Excess energy intake as evidenced by  BMI more than normative standard for age and sex (overweight 25-29.9)       Nutrition Intervention    Nutrition Prescription  Calories:5942-5018  Protein: g  Fluid:>= 80 oz    Meal Plan (Omkar/Pro/Carb)  Previously provided Heather with 7901-8739 kcal sample meal plan.     Diet Education:    Calorie controlled menu  Lean protein food choices  Healthy snack options  Food journaling tips        Nutrition Counseling:  Strategies: meal planning, portion sizes, healthy snack choices, hydration, fiber intake, protein intake, exercise, food journal      Monitoring and Evaluation:  Evaluation criteria:  Energy Intake  Meet protein needs  Maintain adequate hydration  Monitor weekly weight  Meal planning/preparation  Food journal   Decreased portions at mealtimes and snacks  Physical activity     Barriers to learning:none  Readiness to change: Action:  (Changing behavior)  Comprehension: very good  Expected Compliance: very good

## 2024-11-25 NOTE — TELEPHONE ENCOUNTER
----- Message from Yeni BENITEZ sent at 11/25/2024 10:51 AM EST -----  Regarding: Vitals  Melanie,     Here are Heather's vitals today:    Temp 97.7  /84  HR 80  O2 97    Thank you!    Yeni

## 2024-11-27 ENCOUNTER — TELEPHONE (OUTPATIENT)
Age: 49
End: 2024-11-27

## 2024-11-27 DIAGNOSIS — E66.3 OVERWEIGHT: ICD-10-CM

## 2024-11-27 RX ORDER — PHENTERMINE HYDROCHLORIDE 37.5 MG/1
18.75 TABLET ORAL EVERY MORNING
Qty: 15 TABLET | Refills: 1 | Status: SHIPPED | OUTPATIENT
Start: 2024-11-27

## 2024-12-04 ENCOUNTER — OFFICE VISIT (OUTPATIENT)
Dept: FAMILY MEDICINE CLINIC | Facility: CLINIC | Age: 49
End: 2024-12-04
Payer: COMMERCIAL

## 2024-12-04 ENCOUNTER — APPOINTMENT (OUTPATIENT)
Dept: RADIOLOGY | Facility: MEDICAL CENTER | Age: 49
End: 2024-12-04
Payer: COMMERCIAL

## 2024-12-04 VITALS
DIASTOLIC BLOOD PRESSURE: 84 MMHG | SYSTOLIC BLOOD PRESSURE: 122 MMHG | BODY MASS INDEX: 28.15 KG/M2 | OXYGEN SATURATION: 99 % | TEMPERATURE: 97.8 F | WEIGHT: 196.6 LBS | HEART RATE: 86 BPM | HEIGHT: 70 IN

## 2024-12-04 DIAGNOSIS — M54.50 LUMBAR PAIN: Primary | ICD-10-CM

## 2024-12-04 DIAGNOSIS — M62.838 SPASM OF LEFT PIRIFORMIS MUSCLE: ICD-10-CM

## 2024-12-04 DIAGNOSIS — M54.50 LUMBAR PAIN: ICD-10-CM

## 2024-12-04 PROCEDURE — 99213 OFFICE O/P EST LOW 20 MIN: CPT | Performed by: FAMILY MEDICINE

## 2024-12-04 PROCEDURE — 72100 X-RAY EXAM L-S SPINE 2/3 VWS: CPT

## 2024-12-04 RX ORDER — METHYLPREDNISOLONE 4 MG/1
TABLET ORAL
Qty: 21 EACH | Refills: 0 | Status: SHIPPED | OUTPATIENT
Start: 2024-12-04

## 2024-12-04 RX ORDER — CYCLOBENZAPRINE HCL 10 MG
10 TABLET ORAL
Qty: 30 TABLET | Refills: 0 | Status: SHIPPED | OUTPATIENT
Start: 2024-12-04

## 2024-12-04 NOTE — PROGRESS NOTES
Name: Heather Reaves      : 1975      MRN: [id number]  Encounter Provider: Carlos Ruano DO  Encounter Date: 2024   Encounter department: [facility]  :  Assessment & Plan  Lumbar pain  Mostly localized to the left PSIS and paravertebral musculature.  Will obtain baseline x-ray.  Will stop NSAIDs.  Trial of Medrol Dosepak.  Orders:    XR spine lumbar 2 or 3 views injury; Future    methylPREDNISolone 4 MG tablet therapy pack; Use as directed on package    Spasm of left piriformis muscle  Discussed stretching of the piriformis muscle.  Considering the patient cannot sleep at night, we will do a trial of Flexeril 10 mg by mouth at bedtime as needed for spasm.  Orders:    cyclobenzaprine (FLEXERIL) 10 mg tablet; Take 1 tablet (10 mg total) by mouth daily at bedtime as needed for muscle spasms           History of Present Illness     The patient is a pleasant 49-year-old female who presents today with complaints of increasing left-sided low back pain and left buttock discomfort.  She also feels a warm sensation going down to her popliteal fossa.  No history of trauma.  No loss of bowel or bladder control.  No saddle anesthesia.    Back Pain  Pertinent negatives include no abdominal pain, chest pain, dysuria or fever.     Review of Systems   Constitutional:  Negative for chills and fever.   HENT:  Negative for ear pain and sore throat.    Eyes:  Negative for pain and visual disturbance.   Respiratory:  Negative for cough and shortness of breath.    Cardiovascular:  Negative for chest pain and palpitations.   Gastrointestinal:  Negative for abdominal pain and vomiting.   Genitourinary:  Negative for dysuria and hematuria.   Musculoskeletal:  Positive for back pain. Negative for arthralgias.   Skin:  Negative for color change and rash.   Neurological:  Negative for seizures and syncope.   All other systems reviewed and are negative.         Objective   /84   Pulse 86   Temp 97.8 °F  "(36.6 °C)   Ht 5' 10\" (1.778 m)   Wt 89.2 kg (196 lb 9.6 oz)   SpO2 99%   BMI 28.21 kg/m²      Physical Exam  Vitals and nursing note reviewed.   Constitutional:       General: She is not in acute distress.     Appearance: She is well-developed. She is not ill-appearing, toxic-appearing or diaphoretic.   HENT:      Head: Normocephalic and atraumatic.      Mouth/Throat:      Mouth: Mucous membranes are moist.   Eyes:      Conjunctiva/sclera: Conjunctivae normal.   Cardiovascular:      Rate and Rhythm: Normal rate and regular rhythm.      Heart sounds: No murmur heard.  Pulmonary:      Effort: Pulmonary effort is normal. No respiratory distress.      Breath sounds: Normal breath sounds.   Abdominal:      General: Bowel sounds are normal.      Palpations: Abdomen is soft.      Tenderness: There is no abdominal tenderness.   Musculoskeletal:         General: No swelling.      Cervical back: Neck supple.      Comments: Lumbar spine with positive tenderness to palpation over the left PSIS.  Piriformis muscle is in spasm.  Lumbar paravertebral musculature is in spasm.  Greater trochanteric bursa only with mild tenderness.  Negative iliotibial band tenderness.   Skin:     General: Skin is warm and dry.      Capillary Refill: Capillary refill takes less than 2 seconds.   Neurological:      Mental Status: She is alert.   Psychiatric:         Mood and Affect: Mood normal.         Behavior: Behavior normal.         "

## 2024-12-09 ENCOUNTER — RESULTS FOLLOW-UP (OUTPATIENT)
Dept: FAMILY MEDICINE CLINIC | Facility: CLINIC | Age: 49
End: 2024-12-09

## 2024-12-13 DIAGNOSIS — G57.02 PIRIFORMIS SYNDROME OF LEFT SIDE: ICD-10-CM

## 2024-12-13 DIAGNOSIS — M54.50 LEFT LOW BACK PAIN, UNSPECIFIED CHRONICITY, UNSPECIFIED WHETHER SCIATICA PRESENT: ICD-10-CM

## 2024-12-13 DIAGNOSIS — M70.62 GREATER TROCHANTERIC BURSITIS OF LEFT HIP: Primary | ICD-10-CM

## 2024-12-13 DIAGNOSIS — M76.32 ILIOTIBIAL BAND SYNDROME OF LEFT SIDE: ICD-10-CM

## 2024-12-13 DIAGNOSIS — M62.838 SPASM OF LEFT PIRIFORMIS MUSCLE: ICD-10-CM

## 2024-12-18 ENCOUNTER — EVALUATION (OUTPATIENT)
Dept: PHYSICAL THERAPY | Facility: CLINIC | Age: 49
End: 2024-12-18
Payer: COMMERCIAL

## 2024-12-18 DIAGNOSIS — M70.62 GREATER TROCHANTERIC BURSITIS OF LEFT HIP: Primary | ICD-10-CM

## 2024-12-18 DIAGNOSIS — G57.02 PIRIFORMIS SYNDROME OF LEFT SIDE: ICD-10-CM

## 2024-12-18 PROCEDURE — 97161 PT EVAL LOW COMPLEX 20 MIN: CPT

## 2024-12-18 PROCEDURE — 97140 MANUAL THERAPY 1/> REGIONS: CPT

## 2024-12-18 PROCEDURE — 97110 THERAPEUTIC EXERCISES: CPT

## 2024-12-18 NOTE — PROGRESS NOTES
"PT Evaluation     Today's date: 2024  Patient name: Heather Reaves  : 1975  MRN: [id number]  Referring provider: Carlos Ruano DO  Dx: No diagnosis found.               Assessment  Impairments: abnormal or restricted ROM, activity intolerance, impaired physical strength, pain with function, participation limitations and activity limitations  Other impairment: pelvic obliquity (reduced with MET at IE)  Symptom irritability: high    Assessment details: Heather is a 49 year old female with complaint of L lower flank and hip pain.  She presented today with a pelvic obliquity that was reduced with MET pelvic clearing.  She has L concavity levoscliosis and increased tone into L gluteals and deep hip musculature.  Pt with fair glute activation and pain with bridge.  She will benefit from a comprehensive program to address these deficits and improve her hip stability and core strength.   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   Decrease low back pain by 25% for improved tolerance with adls and home duties by 3-4 weeks.   Improve Lumbar rom to wfl for improved tolerance with adls and home duties by 3-4 weeks. \"    LTGs:  \"Improve FOTO score from 45 to 68 indicating improved tolerance with activities involving the low back by discharge.   Patient will demonstrate rom and strength to the pelvis and hips wfl for improved tolerance with adls and home duties by discharge.    Patient will be free of radicular symptoms by discharge.  Pt will be able to lay on R side at night without onset of symptoms  Pt will be able to sit to tolerate job demands without exacerbation of symptoms      Plan  Patient would benefit from: skilled physical therapy  Planned modality interventions: cryotherapy and thermotherapy: hydrocollator packs    Planned therapy interventions: abdominal trunk stabilization, ADL retraining, " body mechanics training, flexibility, functional ROM exercises, home exercise program, therapeutic exercise, therapeutic activities, stretching, strengthening, postural training, patient education, joint mobilization and manual therapy    Frequency: 2x week  Duration in weeks: 8  Plan of Care beginning date: 12/18/2024  Plan of Care expiration date: 2/12/2025  Treatment plan discussed with: patient  Plan details: Patient informed that from this point forward, to ensure adherence to the aforementioned plan of care, all or some of the treatment may be performed and carried out by a Physical Therapy Assistant (PTA) with supervision from a licensed Physical Therapist (PT) in accordance with [facility] Physical Therapy Practice Act.  Patient will continue to benefit from skilled physical therapy to address the functional deficits that were identified during the evaluation today. We will continue to progress the therapy program to address these functional deficits and achieve the established goals.                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  States a pulsating discomfort.  When she lays down at night, states pain is excruciating.  Has tried several sleeping positions with difficulty finding relief.      Saw MD - given a steroid pack and flexiril - states she is finally at a level where pain is not excruciating but still having diff with laying down.  Has began walking on treadmill.      Discomfort with sitting   Patient Goals  Patient goals for therapy: decreased edema, return to sport/leisure activities and independence with ADLs/IADLs    Pain  Quality: sharp  Exacerbated by: laying down at night.    Life stress: demands at farm      Diagnostic Tests  X-ray: abnormal  Treatments  Previous treatment: chiropractic, massage and medication (some  "relief with chiro manip)  Current treatment: medication        Objective     Static Posture     Pelvis   Pelvis (Left): Obliquity.     Comments  Left pelvic obliquity reduced with a MET pelvic clearing     Active Range of Motion     Lumbar   Flexion:  WFL  Extension: 10 (L sided discomfrot) degrees  with pain  Left Hip   Flexion: WFL    Right Hip   Flexion: WFL    Additional Active Range of Motion Details  HS 90/90  R: full rom   L: full rom    Strength/Myotome Testing     Left Hip   Planes of Motion   Flexion: 4  Abduction: 4+  Adduction: 4+    Right Hip   Planes of Motion   Flexion: 4+  Abduction: 5  Adduction: 5             Precautions: none      Date 12/18       FOTO 45       Manuals        STM L glute and piriformis in R sidelying        L hip LAD                        Neuro Re-Ed        PPT        Clamshell                                                Ther Ex        SKTC 3x15\"       Figure 4 3x15\"       HS 90/90 20x       Glute set 20x       TA brace                                Ther Activity                        Gait Training                        Modalities                               "

## 2024-12-18 NOTE — LETTER
2024    Carlos Ruano DO  [address] [facility] [address]    Patient: Heather Reaves   YOB: 1975   Date of Visit: 2024     Encounter Diagnosis     ICD-10-CM    1. Greater trochanteric bursitis of left hip  M70.62       2. Piriformis syndrome of left side  G57.02           Dear Dr. Ruano:    Thank you for your recent referral of Heather Reaves. Please review the attached evaluation summary from Heather's recent visit.     Please verify that you agree with the plan of care by signing the attached order.     If you have any questions or concerns, please do not hesitate to call.     I sincerely appreciate the opportunity to share in the care of one of your patients and hope to have another opportunity to work with you in the near future.       Sincerely,    Jin Warner, PT      Referring Provider:      I certify that I have read the below Plan of Care and certify the need for these services furnished under this plan of treatment while under my care.                    Carlos Ruano DO  [address] [facility] [address]  Via In Basket          PT Evaluation     Today's date: 2024  Patient name: Heather Reaves  : 1975  MRN: [id number]  Referring provider: Carlos Ruano DO  Dx: No diagnosis found.               Assessment  Impairments: abnormal or restricted ROM, activity intolerance, impaired physical strength, pain with function, participation limitations and activity limitations  Other impairment: pelvic obliquity (reduced with MET at IE)  Symptom irritability: high    Assessment details: Heather is a 49 year old female with complaint of L lower flank and hip pain.  She presented today with a pelvic obliquity that was reduced with MET pelvic clearing.  She has L concavity levoscliosis and increased tone into L gluteals and deep hip musculature.  Pt with fair glute activation and pain with bridge.  She will benefit from a comprehensive program to  "address these deficits and improve her hip stability and core strength.   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   Decrease low back pain by 25% for improved tolerance with adls and home duties by 3-4 weeks.   Improve Lumbar rom to wfl for improved tolerance with adls and home duties by 3-4 weeks. \"    LTGs:  \"Improve FOTO score from 45 to 68 indicating improved tolerance with activities involving the low back by discharge.   Patient will demonstrate rom and strength to the pelvis and hips wfl for improved tolerance with adls and home duties by discharge.    Patient will be free of radicular symptoms by discharge.  Pt will be able to lay on R side at night without onset of symptoms  Pt will be able to sit to tolerate job demands without exacerbation of symptoms      Plan  Patient would benefit from: skilled physical therapy  Planned modality interventions: cryotherapy and thermotherapy: hydrocollator packs    Planned therapy interventions: abdominal trunk stabilization, ADL retraining, body mechanics training, flexibility, functional ROM exercises, home exercise program, therapeutic exercise, therapeutic activities, stretching, strengthening, postural training, patient education, joint mobilization and manual therapy    Frequency: 2x week  Duration in weeks: 8  Plan of Care beginning date: 12/18/2024  Plan of Care expiration date: 2/12/2025  Treatment plan discussed with: patient  Plan details: Patient informed that from this point forward, to ensure adherence to the aforementioned plan of care, all or some of the treatment may be performed and carried out by a Physical Therapy Assistant (PTA) with supervision from a licensed Physical Therapist (PT) in accordance with [facility] Physical Therapy Practice Act.  Patient will continue to benefit from skilled physical therapy to address the functional " deficits that were identified during the evaluation today. We will continue to progress the therapy program to address these functional deficits and achieve the established goals.                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  States a pulsating discomfort.  When she lays down at night, states pain is excruciating.  Has tried several sleeping positions with difficulty finding relief.      Saw MD - given a steroid pack and flexiril - states she is finally at a level where pain is not excruciating but still having diff with laying down.  Has began walking on treadmill.      Discomfort with sitting   Patient Goals  Patient goals for therapy: decreased edema, return to sport/leisure activities and independence with ADLs/IADLs    Pain  Quality: sharp  Exacerbated by: laying down at night.    Life stress: demands at farm      Diagnostic Tests  X-ray: abnormal  Treatments  Previous treatment: chiropractic, massage and medication (some relief with chiro manip)  Current treatment: medication        Objective     Static Posture     Pelvis   Pelvis (Left): Obliquity.     Comments  Left pelvic obliquity reduced with a MET pelvic clearing     Active Range of Motion     Lumbar   Flexion:  WFL  Extension: 10 (L sided discomfrot) degrees  with pain  Left Hip   Flexion: WFL    Right Hip   Flexion: WFL    Additional Active Range of Motion Details  HS 90/90  R: full rom   L: full rom    Strength/Myotome Testing     Left Hip   Planes of Motion   Flexion: 4  Abduction: 4+  Adduction: 4+    Right Hip   Planes of Motion   Flexion: 4+  Abduction: 5  Adduction: 5             Precautions: none      Date 12/18       FOTO 45       Manuals        STM L glute and piriformis in R sidelying        L hip LAD                        Neuro Re-Ed        PPT        [facility]               "                                  Ther Ex        SKTC 3x15\"       Figure 4 3x15\"       HS 90/90 20x       Glute set 20x       TA brace                                Ther Activity                        Gait Training                        Modalities                                               "

## 2024-12-19 ENCOUNTER — OFFICE VISIT (OUTPATIENT)
Dept: PHYSICAL THERAPY | Facility: CLINIC | Age: 49
End: 2024-12-19
Payer: COMMERCIAL

## 2024-12-19 DIAGNOSIS — G57.02 PIRIFORMIS SYNDROME OF LEFT SIDE: Primary | ICD-10-CM

## 2024-12-19 DIAGNOSIS — M70.62 GREATER TROCHANTERIC BURSITIS OF LEFT HIP: ICD-10-CM

## 2024-12-19 PROCEDURE — 97110 THERAPEUTIC EXERCISES: CPT

## 2024-12-19 PROCEDURE — 97140 MANUAL THERAPY 1/> REGIONS: CPT

## 2024-12-19 NOTE — PROGRESS NOTES
"Daily Note     Today's date: 2024  Patient name: Haether Reaves  : 1975  MRN: [id number]  Referring provider: Carlos Ruano DO  Dx: No diagnosis found.               Subjective: Able to sleep on sides yesterday with less pain.  States she has felt some relief since IE yesterday.        Objective: See treatment diary below      Assessment: Tolerated treatment well. Patient would benefit from continued PT.  Performed MET pelvic clearing with audible reduction.  Began exercise per flow sheet.  Cueing provided.  Positive response to tx today      Plan: Continue per plan of care.      Precautions: none      Date       FOTO 45       Manuals        STM L glute and piriformis in R sidelying  TS      L hip LAD  TS      Pelvic clearing MET (PRN) TS TS              Neuro Re-Ed        PPT  20x       Clamshell  NV      Supine march  NV      Adduction squeeze  NV                              Ther Ex        SKTC 3x15\" 3x15\"       Figure 4 3x15\" 3x15\"      HS 90/90 20x 20x  ea      Glute set 20x 20x 3\"      TA brace  20x3\"                               Ther Activity                        Gait Training                        Modalities                               "

## 2024-12-23 ENCOUNTER — OFFICE VISIT (OUTPATIENT)
Dept: PHYSICAL THERAPY | Facility: CLINIC | Age: 49
End: 2024-12-23
Payer: COMMERCIAL

## 2024-12-23 DIAGNOSIS — G57.02 PIRIFORMIS SYNDROME OF LEFT SIDE: Primary | ICD-10-CM

## 2024-12-23 DIAGNOSIS — M70.62 GREATER TROCHANTERIC BURSITIS OF LEFT HIP: ICD-10-CM

## 2024-12-23 PROCEDURE — 97110 THERAPEUTIC EXERCISES: CPT

## 2024-12-23 PROCEDURE — 97112 NEUROMUSCULAR REEDUCATION: CPT

## 2024-12-23 PROCEDURE — 97140 MANUAL THERAPY 1/> REGIONS: CPT

## 2024-12-23 NOTE — PROGRESS NOTES
"Daily Note     Today's date: 2024  Patient name: Heather Reaves  : 1975  MRN: [id number]  Referring provider: Carlos Ruano DO  Dx:   Encounter Diagnosis     ICD-10-CM    1. Piriformis syndrome of left side  G57.02       2. Greater trochanteric bursitis of left hip  M70.62                      Subjective: Pt notes difficulty with sleep position; she has has some relief of L SI sx.      Objective: See treatment diary below PTA performed clearing position with audible \"click\"; equal LLD after same. We expanded her program per  PT POC. Handouts provided for clamshell,supine march,hip adduction. She will not perform add at home just yet.      Assessment: Tolerated treatment well. Patient would benefit from continued PT.      Plan: Continue per plan of care.      Precautions: none      Date      FOTO 45       Manuals        STM L glute and piriformis in R sidelying  TS ds     L hip LAD  TS NP     Pelvic clearing MET (PRN) TS TS ds             Neuro Re-Ed        PPT  20x  20x     Clamshell  NV      Supine march  NV      Adduction squeeze  NV 15x 3\"                             Ther Ex        SKTC 3x15\" 3x15\"  3x 15\"     Figure 4 3x15\" 3x15\" 3x 15\"     HS 90/90 20x 20x  ea 20x ea     Glute set 20x 20x 3\" 20x 3\"     TA brace  20x3\"  20x 3\"                             Ther Activity                        Gait Training                        Modalities        HP   Ds post                      "
58.9

## 2024-12-26 ENCOUNTER — OFFICE VISIT (OUTPATIENT)
Dept: PHYSICAL THERAPY | Facility: CLINIC | Age: 49
End: 2024-12-26
Payer: COMMERCIAL

## 2024-12-26 DIAGNOSIS — M70.62 GREATER TROCHANTERIC BURSITIS OF LEFT HIP: Primary | ICD-10-CM

## 2024-12-26 DIAGNOSIS — G57.02 PIRIFORMIS SYNDROME OF LEFT SIDE: ICD-10-CM

## 2024-12-26 PROCEDURE — 97110 THERAPEUTIC EXERCISES: CPT

## 2024-12-26 PROCEDURE — 97140 MANUAL THERAPY 1/> REGIONS: CPT

## 2024-12-26 NOTE — PROGRESS NOTES
"Daily Note     Today's date: 2024  Patient name: Heather Reaves  : 1975  MRN: [id number]  Referring provider: Carlos Ruano DO  Dx: No diagnosis found.               Subjective: Pt states she's not feeling to bad at present. Took motrin last night      Objective: See treatment diary below      Assessment: Tolerated treatment well. Patient exhibited good technique with therapeutic exercises.  Tolerated progression to bridge well      Plan: Continue per plan of care.      Precautions: none      Date     FOTO 45       Manuals        STM L glute and piriformis in R sidelying  TS ds     L hip LAD  TS NP     Pelvic clearing MET (PRN) TS TS ds             Neuro Re-Ed        PPT  20x  20x 20x    Clamshell  NV      Supine march  NV  2x10ea    Adduction squeeze  NV 15x 3\" 15x3\"                            Ther Ex        SKTC 3x15\" 3x15\"  3x 15\"     Figure 4 3x15\" 3x15\" 3x 15\" 3x15\"    HS 90/90 20x 20x  ea 20x ea 20x     Glute set 20x 20x 3\" 20x 3\" 5x5\"    Bridge    10    Supine glute stretch    3x15\"    TA brace  20x3\"  20x 3\"                             Ther Activity                        Gait Training                        Modalities        HP   Ds post TS with ex                        "

## 2025-01-02 ENCOUNTER — OFFICE VISIT (OUTPATIENT)
Dept: PHYSICAL THERAPY | Facility: CLINIC | Age: 50
End: 2025-01-02
Payer: COMMERCIAL

## 2025-01-02 DIAGNOSIS — G57.02 PIRIFORMIS SYNDROME OF LEFT SIDE: ICD-10-CM

## 2025-01-02 DIAGNOSIS — M70.62 GREATER TROCHANTERIC BURSITIS OF LEFT HIP: Primary | ICD-10-CM

## 2025-01-02 PROCEDURE — 97140 MANUAL THERAPY 1/> REGIONS: CPT

## 2025-01-02 PROCEDURE — 97110 THERAPEUTIC EXERCISES: CPT

## 2025-01-02 PROCEDURE — 97112 NEUROMUSCULAR REEDUCATION: CPT

## 2025-01-02 NOTE — PROGRESS NOTES
"Daily Note     Today's date: 2025  Patient name: Heather eRaves  : 1975  MRN: [id number]  Referring provider: Carlos Ruano DO  Dx:   Encounter Diagnosis     ICD-10-CM    1. Greater trochanteric bursitis of left hip  M70.62       2. Piriformis syndrome of left side  G57.02           Start Time: 1715  Stop Time: 1800  Total time in clinic (min): 45 minutes    Subjective: Pt comments on performing  her HEP as directed. She   continues to be hesitant with heavier lifting at home.       Objective: See treatment diary below      Assessment: Tolerated treatment well. Patient exhibited good technique with therapeutic exercises      Plan: Continue per plan of care.      Precautions: none      Date    FOTO 45    Ds 61   Manuals        STM L glute and piriformis in R sidelying  TS ds  ds   L hip LAD  TS NP     Pelvic clearing MET (PRN) TS TS ds  TS            Neuro Re-Ed        PPT  20x  20x 20x 20x   Clamshell  NV      Supine march  NV  2x10ea 2/10 ea   Adduction squeeze  NV 15x 3\" 15x3\" 20x 3\"                           Ther Ex        SKTC 3x15\" 3x15\"  3x 15\"     Figure 4 3x15\" 3x15\" 3x 15\" 3x15\" 3x 15\"   HS 90/90 20x 20x  ea 20x ea 20x  20x    Glute set 20x 20x 3\" 20x 3\" 5x5\" 5x 5\"   Bridge    10 10x    Supine glute stretch    3x15\" 3x 15\"   TA brace  20x3\"  20x 3\" 20x 3\" 20x 3\"                           Ther Activity                        Gait Training                        Modalities        HP   Ds post TS with ex  Post SL ds                        "

## 2025-01-06 ENCOUNTER — OFFICE VISIT (OUTPATIENT)
Dept: PHYSICAL THERAPY | Facility: CLINIC | Age: 50
End: 2025-01-06
Payer: COMMERCIAL

## 2025-01-06 DIAGNOSIS — G57.02 PIRIFORMIS SYNDROME OF LEFT SIDE: Primary | ICD-10-CM

## 2025-01-06 DIAGNOSIS — M70.62 GREATER TROCHANTERIC BURSITIS OF LEFT HIP: ICD-10-CM

## 2025-01-06 PROCEDURE — 97112 NEUROMUSCULAR REEDUCATION: CPT

## 2025-01-06 PROCEDURE — 97110 THERAPEUTIC EXERCISES: CPT

## 2025-01-06 NOTE — PROGRESS NOTES
"Daily Note     Today's date: 2025  Patient name: Heather Reaves  : 1975  MRN: [id number]  Referring provider: Carlos Ruano DO  Dx: No diagnosis found.               Subjective: Pt states she is doing well today; states she was working at the house Saturday and is a little sore and tender      Objective: See treatment diary below      Assessment: Tolerated treatment well. Patient exhibited good technique with therapeutic exercises and would benefit from continued PT.  Added nustep and hip abd/ext today with good tolerance.        Plan: Continue per plan of care.      Precautions: none      Date    FOTO     Ds 61   Manuals        STM L glute and piriformis in R sidelying  TS ds  ds   L hip LAD  TS NP     Pelvic clearing MET (PRN)  TS ds  TS            Neuro Re-Ed        PPT 20x 20x  20x 20x 20x   Clamshell  NV      Supine march  NV  2x10ea 2/10 ea   Adduction squeeze  NV 15x 3\" 15x3\" 20x 3\"   Standing hip abd/ext 10x ea                        Ther Ex        Figure 4 3x15\"  3x15\" 3x 15\" 3x15\" 3x 15\"   HS 90/90 20x ea  20x  ea 20x ea 20x  20x    Glute set  20x 3\" 20x 3\" 5x5\" 5x 5\"   Bridge 15x    10 10x    Supine glute stretch 3x15\"    3x15\" 3x 15\"   TA brace  20x3\"  20x 3\" 20x 3\" 20x 3\"   DKTC/LTR c PB  Yellow        Nu Step 6 min L5                Ther Activity                        Gait Training                        Modalities        HP   Ds post TS with ex  Post SL ds                          "

## 2025-01-09 ENCOUNTER — OFFICE VISIT (OUTPATIENT)
Dept: PHYSICAL THERAPY | Facility: CLINIC | Age: 50
End: 2025-01-09
Payer: COMMERCIAL

## 2025-01-09 DIAGNOSIS — M70.62 GREATER TROCHANTERIC BURSITIS OF LEFT HIP: ICD-10-CM

## 2025-01-09 DIAGNOSIS — G57.02 PIRIFORMIS SYNDROME OF LEFT SIDE: Primary | ICD-10-CM

## 2025-01-09 PROCEDURE — 97110 THERAPEUTIC EXERCISES: CPT

## 2025-01-09 PROCEDURE — 97112 NEUROMUSCULAR REEDUCATION: CPT

## 2025-01-09 NOTE — PROGRESS NOTES
"Daily Note     Today's date: 2025  Patient name: Heahter Reaves  : 1975  MRN: [id number]  Referring provider: Carlos Ruano DO  Dx:   Encounter Diagnosis     ICD-10-CM    1. Piriformis syndrome of left side  G57.02       2. Greater trochanteric bursitis of left hip  M70.62                      Subjective: Pt states she was able help her  lift a table without increased pain .       Objective: See treatment diary below      Assessment: Tolerated treatment well. Patient exhibited good technique with therapeutic exercises.      Plan: Continue per plan of care.      Precautions: none      Date    FOTO     Ds 61   Manuals        STM L glute and piriformis in R sidelying   ds  ds   L hip LAD   NP     Pelvic clearing MET (PRN)   ds  TS            Neuro Re-Ed        PPT 20x 20x  20x 20x 20x   Clamshell        Supine march    2x10ea 2/10 ea   Adduction squeeze   15x 3\" 15x3\" 20x 3\"   Standing hip abd/ext 10x ea  15x ea                      Ther Ex        Figure 4 3x15\"  3x15\" 3x 15\" 3x15\" 3x 15\"   HS 90/90 20x ea  20x  ea 20x ea 20x  20x    Glute set   20x 3\" 5x5\" 5x 5\"   Bridge 15x  15x  c add  10 10x    Supine glute stretch 3x15\"  3x 15\"  3x15\" 3x 15\"   TA brace   20x 3\" 20x 3\" 20x 3\"   DKTC/LTR c PB  Yellow  Y  20x ea      Nu Step 6 min L5  8m L5              Ther Activity                        Gait Training                        Modalities        HP   Ds post TS with ex  Post SL ds                            "

## 2025-01-13 ENCOUNTER — OFFICE VISIT (OUTPATIENT)
Dept: PHYSICAL THERAPY | Facility: CLINIC | Age: 50
End: 2025-01-13
Payer: COMMERCIAL

## 2025-01-13 DIAGNOSIS — G57.02 PIRIFORMIS SYNDROME OF LEFT SIDE: Primary | ICD-10-CM

## 2025-01-13 DIAGNOSIS — M70.62 GREATER TROCHANTERIC BURSITIS OF LEFT HIP: ICD-10-CM

## 2025-01-13 PROCEDURE — 97110 THERAPEUTIC EXERCISES: CPT

## 2025-01-13 NOTE — PROGRESS NOTES
"Daily Note     Today's date: 2025  Patient name: Heather Reaves  : 1975  MRN: [id number]  Referring provider: Carlos Ruano DO  Dx: No diagnosis found.               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 Patient demonstrated fatigue post treatment and would benefit from continued PT      Plan: Continue per plan of care.      Precautions: none      Date    FOTO   67 TS  Ds 61   Manuals        STM L glute and piriformis in R sidelying     ds   L hip LAD        Pelvic clearing MET (PRN)     TS            Neuro Re-Ed        PPT 20x 20x   20x 20x   Clamshell        Supine march    2x10ea 2/10 ea   Adduction squeeze    15x3\" 20x 3\"   Leg Press    40# 30x      CC Walkouts    P4 5x              Ther Ex        Figure 4 3x15\"  3x15\" 3x15\"  3x15\" 3x 15\"   HS 90/90 20x ea  20x  ea 20x 20x  20x    Bridge 15x  15x  c add  10 10x    Supine glute stretch 3x15\"  3x 15\" 3x15\"  3x15\" 3x 15\"   DKTC/LTR c PB  Yellow  Y  20x ea      Nu Step 6 min L5  8m L5 8 min L5      Paloff Press    P2 20x ea     Back Extension    15# 20x     Hip abd on MH   30# 15x ea      Supine glute stretch   3x15\" ea              Ther Activity                        Gait Training                        Modalities        HP    TS with ex  Post SL ds                              "

## 2025-01-16 ENCOUNTER — OFFICE VISIT (OUTPATIENT)
Dept: PHYSICAL THERAPY | Facility: CLINIC | Age: 50
End: 2025-01-16
Payer: COMMERCIAL

## 2025-01-16 DIAGNOSIS — G57.02 PIRIFORMIS SYNDROME OF LEFT SIDE: Primary | ICD-10-CM

## 2025-01-16 DIAGNOSIS — M70.62 GREATER TROCHANTERIC BURSITIS OF LEFT HIP: ICD-10-CM

## 2025-01-16 PROCEDURE — 97110 THERAPEUTIC EXERCISES: CPT

## 2025-01-16 PROCEDURE — 97112 NEUROMUSCULAR REEDUCATION: CPT

## 2025-01-16 NOTE — PROGRESS NOTES
"Daily Note     Today's date: 2025  Patient name: Heather Reaves  : 1975  MRN: [id number]  Referring provider: Carlos Ruano DO  Dx:   Encounter Diagnosis     ICD-10-CM    1. Piriformis syndrome of left side  G57.02       2. Greater trochanteric bursitis of left hip  M70.62                      Subjective: Pt notes increased nickolas to lifting. `       Objective: See treatment diary below      Assessment: Tolerated treatment well. Patient exhibited good technique with therapeutic exercises      Plan: Continue per plan of care.      Precautions: none      Date    FOTO   67 TS  Ds 61   Manuals        STM L glute and piriformis in R sidelying     ds   L hip LAD        Pelvic clearing MET (PRN)     TS    Neuro Re-Ed        PPT 20x 20x    20x   Clamshell        Supine march     2/10 ea   Adduction squeeze     20x 3\"   Leg Press    40# 30x  40# 30x    CC Walkouts    P4 5x  P4 5x            Ther Ex        Figure 4 3x15\"  3x15\" 3x15\"  3x15\" 3x 15\"   HS 90/90 20x ea  20x  ea 20x 20x  20x    Bridge 15x  15x  c add   10x    Supine glute stretch 3x15\"  3x 15\" 3x15\"  3x15\" 3x 15\"   DKTC/LTR c PB  Yellow  Y  20x ea  Y 20x ea    Nu Step 6 min L5  8m L5 8 min L5  8m L5    Paloff Press    P2 20x ea P2 20x ea    Back Extension    15# 20x 15# 20x     Hip abd on MH   30# 15x ea  30# 15/10 xea                    Ther Activity                        Gait Training                        Modalities        HP     Post SL ds                                "

## 2025-01-19 ENCOUNTER — TELEPHONE (OUTPATIENT)
Dept: FAMILY MEDICINE CLINIC | Facility: CLINIC | Age: 50
End: 2025-01-19

## 2025-01-20 ENCOUNTER — OFFICE VISIT (OUTPATIENT)
Age: 50
End: 2025-01-20
Payer: COMMERCIAL

## 2025-01-20 ENCOUNTER — APPOINTMENT (OUTPATIENT)
Dept: PHYSICAL THERAPY | Facility: CLINIC | Age: 50
End: 2025-01-20
Payer: COMMERCIAL

## 2025-01-20 VITALS
HEART RATE: 84 BPM | DIASTOLIC BLOOD PRESSURE: 82 MMHG | BODY MASS INDEX: 27.76 KG/M2 | WEIGHT: 193.9 LBS | HEIGHT: 70 IN | OXYGEN SATURATION: 98 % | SYSTOLIC BLOOD PRESSURE: 114 MMHG | TEMPERATURE: 97.8 F

## 2025-01-20 DIAGNOSIS — E66.3 OVERWEIGHT: Primary | ICD-10-CM

## 2025-01-20 DIAGNOSIS — Z79.899 MEDICATION MANAGEMENT: ICD-10-CM

## 2025-01-20 PROCEDURE — 99214 OFFICE O/P EST MOD 30 MIN: CPT | Performed by: PHYSICIAN ASSISTANT

## 2025-01-20 RX ORDER — PHENTERMINE HYDROCHLORIDE 37.5 MG/1
37.5 TABLET ORAL EVERY MORNING
Qty: 30 TABLET | Refills: 1 | Status: SHIPPED | OUTPATIENT
Start: 2025-01-20

## 2025-01-20 NOTE — ASSESSMENT & PLAN NOTE
-Patient is pursuing Conservative Program  -Initial weight loss goal of 5-10% weight loss for improved health  -Screening labs: A1c and LP reviewed from 7/22/24  -AVOID Topamax- not using contraception   Denies hx of CAD, PAD, glaucoma, palpitations or arrhythmia. ECG 7/27/24: NSR  -Phentermine started 7/29/24 (203); HR/BP acceptable- increase dose   -Can consider metformin for cravings- she prefers to avoid due to SE profile   -has been seeing RD   -Dietary recall reviewed, suggestions provided- add veg at night, snack recs given     Initial: 207; BMI 29.70 kg/m²   Current: 193.9   Change: -13.1  Goal: 165

## 2025-01-20 NOTE — PROGRESS NOTES
Assessment/Plan:    Overweight  -Patient is pursuing Conservative Program  -Initial weight loss goal of 5-10% weight loss for improved health  -Screening labs: A1c and LP reviewed from 7/22/24  -AVOID Topamax- not using contraception   Denies hx of CAD, PAD, glaucoma, palpitations or arrhythmia. ECG 7/27/24: NSR  -Phentermine started 7/29/24 (203); HR/BP acceptable- increase dose   -Can consider metformin for cravings- she prefers to avoid due to SE profile   -has been seeing RD   -Dietary recall reviewed, suggestions provided- add veg at night, snack recs given     Initial: 207; BMI 29.70 kg/m²   Current: 193.9   Change: -13.1  Goal: 165      Follow up in 6 weeks with RD and approximately 6 months with Non-Surgical Physician/Advanced Practitioner.    Goals:  Food log (ie.) www.[employer].com,sparkpeople.com,[employer].com,[employer]com,etc. baritastic  No sugary beverages. At least 64oz of water daily.  Increase physical activity by 10 minutes daily. Gradually increase physical activity to a goal of 5 days per week for 30 minutes of MODERATE intensity PLUS 2 days per week of FULL BODY resistance training  5-10 servings of fruits and vegetables per day and 25-35 grams of dietary fiber per day, gradually increasing     Diagnoses and all orders for this visit:    Overweight  -     Comprehensive metabolic panel; Future  -     phentermine (ADIPEX-P) 37.5 MG tablet; Take 1 tablet (37.5 mg total) by mouth every morning Before breakfast or 1-2 hours after    Medication management  -     Comprehensive metabolic panel; Future    Body mass index 27.0-27.9, adult        Subjective:   Chief Complaint   Patient presents with    Follow-up     Weight management.     Patient ID: Heather Reaves  is a 49 y.o. female with excess weight/obesity here to pursue weight management.  Patient is pursuing Conservative Program.     HPI  The patient presents for [facility] follow up.     Phentermine: tolerating; holidays were challenging with sweets  "- continues with sugar cravings - trying to choose fruit     Would like to reduce her carb intake     B: egg wrap with meat  S: chocolate (powder) covered almonds   L: HC meal or protein + veg   S: was having 2-3 cookies- has cut back- now doing cheese stick or yogurt   D: protein + veg + starch (MIL cooks)   S: not usually     Exercise: strength training - 4x/week + treadmill   Hydration: easier to reach goals in the warmer months; thinks getting 30 oz per day     The following portions of the patient's history were reviewed and updated as appropriate: allergies, current medications, past family history, past medical history, past social history, past surgical history, and problem list.    Review of Systems   Cardiovascular:  Negative for chest pain and palpitations.   Psychiatric/Behavioral: Negative.  Negative for sleep disturbance.      Objective:    /82 (Patient Position: Sitting, Cuff Size: Large)   Pulse 84   Temp 97.8 °F (36.6 °C) (Temporal)   Ht 5' 10\" (1.778 m)   Wt 88 kg (193 lb 14.4 oz)   SpO2 98%   BMI 27.82 kg/m²      Physical Exam  Vitals and nursing note reviewed.     Constitutional   General appearance: Abnormal.  well developed and overweight.   Pulmonary   Respiratory effort: No increased work of breathing or signs of respiratory distress.    Musculoskeletal   Gait and station: Normal.    Psychiatric   Orientation to person, place and time: Normal.    Affect: appropriate  "

## 2025-01-23 ENCOUNTER — OFFICE VISIT (OUTPATIENT)
Dept: PHYSICAL THERAPY | Facility: CLINIC | Age: 50
End: 2025-01-23
Payer: COMMERCIAL

## 2025-01-23 DIAGNOSIS — G57.02 PIRIFORMIS SYNDROME OF LEFT SIDE: Primary | ICD-10-CM

## 2025-01-23 DIAGNOSIS — M70.62 GREATER TROCHANTERIC BURSITIS OF LEFT HIP: ICD-10-CM

## 2025-01-23 PROCEDURE — 97110 THERAPEUTIC EXERCISES: CPT

## 2025-01-23 PROCEDURE — 97112 NEUROMUSCULAR REEDUCATION: CPT

## 2025-01-23 PROCEDURE — 97140 MANUAL THERAPY 1/> REGIONS: CPT

## 2025-01-23 NOTE — PROGRESS NOTES
"Daily Note     Today's date: 2025  Patient name: Heather Reaves  : 1975  MRN: [id number]  Referring provider: Carlos Ruano DO  Dx:   Encounter Diagnosis     ICD-10-CM    1. Piriformis syndrome of left side  G57.02       2. Greater trochanteric bursitis of left hip  M70.62                      Subjective: Pt notes persisting L SI discomfort despite continued strengthening.      Objective: See treatment diary below      Assessment: Tolerated treatment well. Patient would benefit from continued PT. TS provided manual correction f/b changes in her program. WE added hip airplanes and prone press up post exercise. She will monitor she positioning while throwing hay. She will perform prone press ups after hay activities, and monitor response She had relief after tx today.      Plan: Continue per plan of care.      Precautions: none      Date    FOTO   67 TS     Manuals        STM L glute and piriformis in R sidelying     ds   L hip LAD        Pelvic clearing MET (PRN)     TS   Neuro Re-Ed        PPT 20x 20x       Clamshell        Supine march        Adduction squeeze        Leg Press    40# 30x  40# 30x 40# 30x    CC Walkouts    P4 5x  P4 5x P4 5x    Hip airplanes     2/10   Ther Ex        Figure 4 3x15\"  3x15\" 3x15\"  3x15\" home   HS 90/90 20x ea  20x  ea 20x 20x  home   Bridge 15x  15x  c add      Supine glute stretch 3x15\"  3x 15\" 3x15\"  3x15\" home   DKTC/LTR c PB  Yellow  Y  20x ea  Y 20x ea Y 20x ea   Nu Step 6 min L5  8m L5 8 min L5  8m L5 NV   Paloff Press    P2 20x ea P2 20x ea P2 20x ea   Back Extension    15# 20x 15# 20x  20# 20x   Hip abd on MH   30# 15x ea  30# 15/10 xea 30# 20x ea   Prone press     15x  post           Ther Activity                        Gait Training                        Modalities        HP                                       "

## 2025-01-27 ENCOUNTER — OFFICE VISIT (OUTPATIENT)
Dept: PHYSICAL THERAPY | Facility: CLINIC | Age: 50
End: 2025-01-27
Payer: COMMERCIAL

## 2025-01-27 DIAGNOSIS — G57.02 PIRIFORMIS SYNDROME OF LEFT SIDE: Primary | ICD-10-CM

## 2025-01-27 DIAGNOSIS — M70.62 GREATER TROCHANTERIC BURSITIS OF LEFT HIP: ICD-10-CM

## 2025-01-27 PROCEDURE — 97112 NEUROMUSCULAR REEDUCATION: CPT

## 2025-01-27 PROCEDURE — 97110 THERAPEUTIC EXERCISES: CPT

## 2025-01-27 NOTE — PROGRESS NOTES
"Daily Note     Today's date: 2025  Patient name: Heather Reaves  : 1975  MRN: [id number]  Referring provider: Carlos Ruano DO  Dx:   Encounter Diagnosis     ICD-10-CM    1. Piriformis syndrome of left side  G57.02       2. Greater trochanteric bursitis of left hip  M70.62                      Subjective: Pt  notes   less c/o this weekend; she did not throw hay winsome. She did not perform prone press at home due to forgetting.      Objective: See treatment diary below      Assessment: Tolerated treatment well. Patient exhibited good technique with therapeutic exercises      Plan: Continue per plan of care.      Precautions: none      Date      FOTO   67 TS     Manuals        STM L glute and piriformis in R sidelying        L hip LAD        Pelvic clearing MET (PRN)        Neuro Re-Ed        Leg Press  40# 30x  40# 30x      CC Walkouts  P4 5x  P4 5x      Hip airplanes 2/10 B/L        Ther Ex        Figure 4 3x15\"  3x15\" 3x15\"      HS 90/90 20x ea  20x  ea 20x     Supine glute stretch 3x15\"  3x 15\" 3x15\"      DKTC/LTR c PB   Y  20x ea      Nu Step 10m L5 8m L5 8 min L5      Paloff Press  P2 20x ea  P2 20x ea     Back Extension  25# 20x   15# 20x     Hip abd on MH 30# 20x ea  30# 15x ea      Prone press 15x post               Ther Activity                        Gait Training                Modalities        HP                                         "

## 2025-01-30 ENCOUNTER — OFFICE VISIT (OUTPATIENT)
Dept: FAMILY MEDICINE CLINIC | Facility: CLINIC | Age: 50
End: 2025-01-30
Payer: COMMERCIAL

## 2025-01-30 ENCOUNTER — APPOINTMENT (OUTPATIENT)
Dept: PHYSICAL THERAPY | Facility: CLINIC | Age: 50
End: 2025-01-30
Payer: COMMERCIAL

## 2025-01-30 VITALS
DIASTOLIC BLOOD PRESSURE: 80 MMHG | SYSTOLIC BLOOD PRESSURE: 120 MMHG | HEART RATE: 84 BPM | OXYGEN SATURATION: 99 % | TEMPERATURE: 97.6 F

## 2025-01-30 DIAGNOSIS — J06.9 URI, ACUTE: Primary | ICD-10-CM

## 2025-01-30 PROCEDURE — 87636 SARSCOV2 & INF A&B AMP PRB: CPT | Performed by: FAMILY MEDICINE

## 2025-01-30 PROCEDURE — 99213 OFFICE O/P EST LOW 20 MIN: CPT | Performed by: FAMILY MEDICINE

## 2025-01-30 NOTE — LETTER
January 30, 2025     Patient: Heather Reaves  YOB: 1975  Date of Visit: 1/30/2025      To Whom it May Concern:    Heather Reaves is under my professional care. Heather was seen in my office on 1/30/2025. Heather may return to work on Monday, February third 2025 .    If you have any questions or concerns, please don't hesitate to call.         Sincerely,          Carlos Ruano, DO        CC: No Recipients

## 2025-01-30 NOTE — PROGRESS NOTES
"PT Evaluation     Today's date: 2025  Patient name: Heather Reaves  : 1975  MRN: [id number]  Referring provider: Carlos Ruano DO  Dx: No diagnosis found.               Assessment  Impairments: abnormal or restricted ROM, activity intolerance, impaired physical strength, pain with function, participation limitations and activity limitations  Other impairment: pelvic obliquity (reduced with MET at IE)  Symptom irritability: high    Assessment details: Heather is a 49 year old female with complaint of L lower flank and hip pain.  She presented today with a pelvic obliquity that was reduced with MET pelvic clearing.  She has L concavity levoscliosis and increased tone into L gluteals and deep hip musculature.  Pt with fair glute activation and pain with bridge.  She will benefit from a comprehensive program to address these deficits and improve her hip stability and core strength.   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   Decrease low back pain by 25% for improved tolerance with adls and home duties by 3-4 weeks.   Improve Lumbar rom to wfl for improved tolerance with adls and home duties by 3-4 weeks. \"    LTGs:  \"Improve FOTO score from 45 to 68 indicating improved tolerance with activities involving the low back by discharge.   Patient will demonstrate rom and strength to the pelvis and hips wfl for improved tolerance with adls and home duties by discharge.    Patient will be free of radicular symptoms by discharge.  Pt will be able to lay on R side at night without onset of symptoms  Pt will be able to sit to tolerate job demands without exacerbation of symptoms      Plan  Patient would benefit from: skilled physical therapy  Planned modality interventions: cryotherapy and thermotherapy: hydrocollator packs    Planned therapy interventions: abdominal trunk stabilization, ADL retraining, " body mechanics training, flexibility, functional ROM exercises, home exercise program, therapeutic exercise, therapeutic activities, stretching, strengthening, postural training, patient education, joint mobilization and manual therapy    Frequency: 2x week  Duration in weeks: 8  Plan of Care beginning date: 12/18/2024  Plan of Care expiration date: 2/12/2025  Treatment plan discussed with: patient  Plan details: Patient informed that from this point forward, to ensure adherence to the aforementioned plan of care, all or some of the treatment may be performed and carried out by a Physical Therapy Assistant (PTA) with supervision from a licensed Physical Therapist (PT) in accordance with [facility] Physical Therapy Practice Act.  Patient will continue to benefit from skilled physical therapy to address the functional deficits that were identified during the evaluation today. We will continue to progress the therapy program to address these functional deficits and achieve the established goals.                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  States a pulsating discomfort.  When she lays down at night, states pain is excruciating.  Has tried several sleeping positions with difficulty finding relief.      Saw MD - given a steroid pack and flexiril - states she is finally at a level where pain is not excruciating but still having diff with laying down.  Has began walking on treadmill.      Discomfort with sitting   Patient Goals  Patient goals for therapy: decreased edema, return to sport/leisure activities and independence with ADLs/IADLs    Pain  Quality: sharp  Exacerbated by: laying down at night.    Life stress: demands at farm      Diagnostic Tests  X-ray: abnormal  Treatments  Previous treatment: chiropractic, massage and medication (some  "relief with chiro manip)  Current treatment: medication        Objective     Static Posture     Pelvis   Pelvis (Left): Obliquity.     Comments  Left pelvic obliquity reduced with a MET pelvic clearing     Active Range of Motion     Lumbar   Flexion:  WFL  Extension: 10 (L sided discomfrot) degrees  with pain  Left Hip   Flexion: WFL    Right Hip   Flexion: WFL    Additional Active Range of Motion Details  HS 90/90  R: full rom   L: full rom    Strength/Myotome Testing     Left Hip   Planes of Motion   Flexion: 4  Abduction: 4+  Adduction: 4+    Right Hip   Planes of Motion   Flexion: 4+  Abduction: 5  Adduction: 5             Precautions: none      Date 1/6 1/9 1/13 1/16 1/23   FOTO   67 TS     Manuals        STM L glute and piriformis in R sidelying     ds   L hip LAD        Pelvic clearing MET (PRN)     TS   Neuro Re-Ed        PPT 20x 20x       Clamshell        Supine march        Adduction squeeze        Leg Press    40# 30x  40# 30x 40# 30x    CC Walkouts    P4 5x  P4 5x P4 5x    Hip airplanes     2/10   Ther Ex        Figure 4 3x15\"  3x15\" 3x15\"  3x15\" home   HS 90/90 20x ea  20x  ea 20x 20x  home   Bridge 15x  15x  c add      Supine glute stretch 3x15\"  3x 15\" 3x15\"  3x15\" home   DKTC/LTR c PB  Yellow  Y  20x ea  Y 20x ea Y 20x ea   Nu Step 6 min L5  8m L5 8 min L5  8m L5 NV   Paloff Press    P2 20x ea P2 20x ea P2 20x ea   Back Extension    15# 20x 15# 20x  20# 20x   Hip abd on MH   30# 15x ea  30# 15/10 xea 30# 20x ea   Prone press     15x  post           Ther Activity                        Gait Training                        Modalities        HP                       "

## 2025-01-30 NOTE — PROGRESS NOTES
Name: Heather Reaves      : 1975      MRN: [id number]  Encounter Provider: Carlos Ruano DO  Encounter Date: 2025   Encounter department: [facility]  :  Assessment & Plan  URI, acute  Will make sure this is not the flu or COVID.  Continue symptomatic care.  Increase fluids, may use Vicks vapor rub, Mucinex as tolerated, humidifier if available.  Orders:    Covid/Flu- Office Collect Normal; Future           History of Present Illness   Patient is a pleasant 49-year-old female who presents today with a 1 day history of diffuse muscle aches, dry cough, congestion, sinus pressure.  No gastrointestinal symptoms.  Patient does work in the healthcare field.    Sinus Problem  Associated symptoms include coughing and sinus pressure. Pertinent negatives include no chills, ear pain, shortness of breath or sore throat.     Review of Systems   Constitutional:  Negative for chills and fever.   HENT:  Positive for postnasal drip, rhinorrhea, sinus pressure and sinus pain. Negative for ear pain and sore throat.    Eyes:  Negative for pain and visual disturbance.   Respiratory:  Positive for cough. Negative for shortness of breath.    Cardiovascular:  Negative for chest pain and palpitations.   Gastrointestinal:  Negative for abdominal pain and vomiting.   Genitourinary:  Negative for dysuria and hematuria.   Musculoskeletal:  Positive for myalgias. Negative for arthralgias and back pain.   Skin:  Negative for color change and rash.   Neurological:  Negative for seizures and syncope.   Psychiatric/Behavioral: Negative.     All other systems reviewed and are negative.      Objective   /80 (BP Location: Right arm, Patient Position: Sitting)   Pulse 84   Temp 97.6 °F (36.4 °C)   SpO2 99%      Physical Exam  Vitals and nursing note reviewed.   Constitutional:       General: She is not in acute distress.     Appearance: She is well-developed. She is ill-appearing. She is not toxic-appearing or  diaphoretic.   HENT:      Head: Normocephalic and atraumatic.      Right Ear: Tympanic membrane normal.      Left Ear: Tympanic membrane normal.      Nose: Congestion and rhinorrhea present.      Mouth/Throat:      Mouth: Mucous membranes are moist.      Pharynx: Posterior oropharyngeal erythema present.   Eyes:      Conjunctiva/sclera: Conjunctivae normal.      Pupils: Pupils are equal, round, and reactive to light.   Cardiovascular:      Rate and Rhythm: Normal rate and regular rhythm.      Pulses: Normal pulses.      Heart sounds: Normal heart sounds. No murmur heard.  Pulmonary:      Effort: Pulmonary effort is normal. No respiratory distress.      Breath sounds: Normal breath sounds. No wheezing or rales.   Abdominal:      General: Bowel sounds are normal.      Palpations: Abdomen is soft.      Tenderness: There is no abdominal tenderness.   Musculoskeletal:         General: No swelling.      Cervical back: Neck supple.   Skin:     General: Skin is warm and dry.      Capillary Refill: Capillary refill takes less than 2 seconds.   Neurological:      General: No focal deficit present.      Mental Status: She is alert and oriented to person, place, and time.   Psychiatric:         Mood and Affect: Mood normal.         Behavior: Behavior normal.

## 2025-01-31 LAB
FLUAV RNA RESP QL NAA+PROBE: NEGATIVE
FLUBV RNA RESP QL NAA+PROBE: NEGATIVE
SARS-COV-2 RNA RESP QL NAA+PROBE: NEGATIVE

## 2025-02-03 ENCOUNTER — RESULTS FOLLOW-UP (OUTPATIENT)
Dept: FAMILY MEDICINE CLINIC | Facility: CLINIC | Age: 50
End: 2025-02-03

## 2025-02-05 ENCOUNTER — EVALUATION (OUTPATIENT)
Dept: PHYSICAL THERAPY | Facility: CLINIC | Age: 50
End: 2025-02-05
Payer: COMMERCIAL

## 2025-02-05 DIAGNOSIS — M70.62 GREATER TROCHANTERIC BURSITIS OF LEFT HIP: Primary | ICD-10-CM

## 2025-02-05 DIAGNOSIS — G57.02 PIRIFORMIS SYNDROME OF LEFT SIDE: ICD-10-CM

## 2025-02-05 PROCEDURE — 97110 THERAPEUTIC EXERCISES: CPT

## 2025-02-05 NOTE — PROGRESS NOTES
PT Re-Evaluation     Today's date: 2025  Patient name: Heather Reaves  : 1975  MRN: [id number]  Referring provider: Carlos Ruano DO  Dx:   Encounter Diagnosis     ICD-10-CM    1. Greater trochanteric bursitis of left hip  M70.62       2. Piriformis syndrome of left side  G57.02           Start Time: 1628  Stop Time: 1715  Total time in clinic (min): 47 minutes    Assessment  Impairments: abnormal or restricted ROM, activity intolerance, impaired physical strength, pain with function, participation limitations and activity limitations  Other impairment: pelvic obliquity (reduced with MET at IE)  Symptom irritability: high    Assessment details: Heather is a 49 year old female with complaint of L lower flank and hip pain.  She presented today with a pelvic obliquity that was reduced with MET pelvic clearing.  She has L concavity levoscliosis and increased tone into L gluteals and deep hip musculature.  Pt with fair glute activation and pain with bridge.  She will benefit from a comprehensive program to address these deficits and improve her hip stability and core strength.      UPDATE: Zoya has been seen for 12 visits for L SIJ pain.  She is continuing to make good progress both objectively and subjectively (FOTO score).  Core strength is progressing well and patient continues to be challenged with hip strength and stability exercises.  She continues to have pain in L SIJ and sacral area, however wih significantly less rating than at IE.  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  Pt has been able to resume treadmill activity and reports sleeping better with less reliance on pain medication.  However, she may be a candidate for injection or shockwave therapy at this time given location of pain.  Please advise.  We will continue to progress function per her tolerance  "moving forward.   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 MET  Decrease low back pain by 25% for improved tolerance with adls and home duties by 3-4 weeks. MET  Improve Lumbar rom to wfl for improved tolerance with adls and home duties by 3-4 weeks. MET\"    LTGs:  \"Improve FOTO score from 45 to 68 indicating improved tolerance with activities involving the low back by discharge. MET  Patient will demonstrate rom and strength to the pelvis and hips wfl for improved tolerance with adls and home duties by discharge.  NEARLY MET  Patient will be free of radicular symptoms by discharge. NEARLY MET  Pt will be able to lay on R side at night without onset of symptoms NEARLY MET  Pt will be able to sit to tolerate job demands without exacerbation of symptoms NEARLY MET      Plan  Patient would benefit from: skilled physical therapy  Planned modality interventions: cryotherapy and thermotherapy: hydrocollator packs    Planned therapy interventions: abdominal trunk stabilization, ADL retraining, body mechanics training, flexibility, functional ROM exercises, home exercise program, therapeutic exercise, therapeutic activities, stretching, strengthening, postural training, patient education, joint mobilization and manual therapy    Frequency: 2x week  Duration in weeks: 4  Plan of Care beginning date: 2/5/2025  Plan of Care expiration date: 3/5/2025  Treatment plan discussed with: patient  Plan details: Patient informed that from this point forward, to ensure adherence to the aforementioned plan of care, all or some of the treatment may be performed and carried out by a Physical Therapy Assistant (PTA) with supervision from a licensed Physical Therapist (PT) in accordance with [facility] Physical Therapy Practice Act.  Patient will continue to benefit from skilled physical therapy to address the functional " deficits that were identified during the evaluation today. We will continue to progress the therapy program to address these functional deficits and achieve the established goals.                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  States a pulsating discomfort.  When she lays down at night, states pain is excruciating.  Has tried several sleeping positions with difficulty finding relief.      Saw MD - given a steroid pack and flexiril - states she is finally at a level where pain is not excruciating but still having diff with laying down.  Has began walking on treadmill.      Discomfort with sitting     2/5 Update: Pt states she is improving - states she is up to 25-30 minutes of walk/run on treadmill several days weekly.  However, states she still continues to feel the pain, ableit less intense.  One episode of pulling hay bails did exacerbate the pain more than anything else.  Feels her core is stronger and she is progressing her hip pain.  Better tolerance to job demands both at farm and at work  Quality of life: good    Patient Goals  Patient goals for therapy: decreased edema, return to sport/leisure activities and independence with ADLs/IADLs    Pain  Quality: sharp  Exacerbated by: laying down at night.    Life stress: demands at farm      Diagnostic Tests  X-ray: abnormal  Treatments  Previous treatment: chiropractic, massage and medication (some relief with chiro manip)  Current treatment: medication        Objective     Static Posture     Pelvis   Pelvis (Left): Obliquity.     Comments  Left pelvic obliquity reduced with a MET pelvic clearing     Active Range of Motion     Lumbar   Flexion:  WFL  Extension: 10 (L sided discomfrot) degrees  with pain  Left Hip   Flexion: WFL    Right Hip   Flexion: WFL    Additional Active Range of Motion  "Details  HS 90/90  R: full rom   L: full rom    Strength/Myotome Testing     Left Hip   Planes of Motion   Flexion: 4+  Extension: 4+  Abduction: 4+  Adduction: 4+    Right Hip   Planes of Motion   Flexion: 4+  Extension: 4+  Abduction: 5  Adduction: 5    Muscle Activation   Patient able to activate left transverse abdominals, left multifidus, right transverse abdominals and right multifidus.              Precautions: none      Date 2/5 1/9 1/13 1/16 1/23   FOTO 70 TS  67 TS     Manuals        STM L glute and piriformis in R sidelying     ds   L hip LAD        Pelvic clearing MET (PRN)     TS   Neuro Re-Ed        PPT  20x       Clamshell        Supine march        Adduction squeeze        Leg Press    40# 30x  40# 30x 40# 30x    CC Walkouts    P4 5x  P4 5x P4 5x    Hip airplanes 2/10    2/10   Ther Ex        Obj. Updates  TS       Figure 4 3/15\" 3x15\" 3x15\"  3x15\" home   HS 90/90 20 ea  20x  ea 20x 20x  home   Bridge 15 with hold 3\"  15x  c add      Supine glute stretch 3/15\"  3x 15\" 3x15\"  3x15\" home   DKTC/LTR c PB   Y  20x ea  Y 20x ea Y 20x ea   Nu Step 8m L5  8m L5 8 min L5  8m L5 NV   Paloff Press  P2 15 ea   P2 20x ea P2 20x ea P2 20x ea   Back Extension    15# 20x 15# 20x  20# 20x   Hip abd on MH 35# 3/10  30# 15x ea  30# 15/10 xea 30# 20x ea   Prone press 2/10 rest on elbows    15x  post           Ther Activity                        Gait Training                        Modalities        HP                       "

## 2025-02-05 NOTE — LETTER
2025    Carlos Ruano DO  [address] [facility] [address]    Patient: Heather Reaves   YOB: 1975   Date of Visit: 2025     Encounter Diagnosis     ICD-10-CM    1. Greater trochanteric bursitis of left hip  M70.62       2. Piriformis syndrome of left side  G57.02           Dear Dr. Ruano:    Thank you for your recent referral of Heather Reaves. Please review the attached evaluation summary from Heather's recent visit.     Please verify that you agree with the plan of care by signing the attached order.     If you have any questions or concerns, please do not hesitate to call.     I sincerely appreciate the opportunity to share in the care of one of your patients and hope to have another opportunity to work with you in the near future.       Sincerely,    Jin Warner, PT      Referring Provider:      I certify that I have read the below Plan of Care and certify the need for these services furnished under this plan of treatment while under my care.                    Carlos Ruano DO  [address] [facility] [address]  Via In Basket          PT Re-Evaluation     Today's date: 2025  Patient name: Heather Reaves  : 1975  MRN: [id number]  Referring provider: Carlos Ruano DO  Dx:   Encounter Diagnosis     ICD-10-CM    1. Greater trochanteric bursitis of left hip  M70.62       2. Piriformis syndrome of left side  G57.02           Start Time: 1628  Stop Time: 1715  Total time in clinic (min): 47 minutes    Assessment  Impairments: abnormal or restricted ROM, activity intolerance, impaired physical strength, pain with function, participation limitations and activity limitations  Other impairment: pelvic obliquity (reduced with MET at IE)  Symptom irritability: high    Assessment details: Heather is a 49 year old female with complaint of L lower flank and hip pain.  She presented today with a pelvic obliquity that was reduced with MET pelvic clearing.  She has  "L concavity levoscliosis and increased tone into L gluteals and deep hip musculature.  Pt with fair glute activation and pain with bridge.  She will benefit from a comprehensive program to address these deficits and improve her hip stability and core strength.     2/5 UPDATE: Zoya has been seen for 12 visits for L SIJ pain.  She is continuing to make good progress both objectively and subjectively (FOTO score).  Core strength is progressing well and patient continues to be challenged with hip strength and stability exercises.  She continues to have pain in L SIJ and sacral area, however wih significantly less rating than at IE.  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  Pt has been able to resume treadmill activity and reports sleeping better with less reliance on pain medication.  However, she may be a candidate for injection or shockwave therapy at this time given location of pain.  Please advise.  We will continue to progress function per her tolerance moving forward.   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 MET  Decrease low back pain by 25% for improved tolerance with adls and home duties by 3-4 weeks. MET  Improve Lumbar rom to wfl for improved tolerance with adls and home duties by 3-4 weeks. MET\"    LTGs:  \"Improve FOTO score from 45 to 68 indicating improved tolerance with activities involving the low back by discharge. MET  Patient will demonstrate rom and strength to the pelvis and hips wfl for improved tolerance with adls and home duties by discharge.  NEARLY MET  Patient will be free of radicular symptoms by discharge. NEARLY MET  Pt will be able to lay on R side at night without onset of symptoms NEARLY MET  Pt will be able to sit to tolerate job demands without " exacerbation of symptoms NEARLY MET      Plan  Patient would benefit from: skilled physical therapy  Planned modality interventions: cryotherapy and thermotherapy: hydrocollator packs    Planned therapy interventions: abdominal trunk stabilization, ADL retraining, body mechanics training, flexibility, functional ROM exercises, home exercise program, therapeutic exercise, therapeutic activities, stretching, strengthening, postural training, patient education, joint mobilization and manual therapy    Frequency: 2x week  Duration in weeks: 4  Plan of Care beginning date: 2/5/2025  Plan of Care expiration date: 3/5/2025  Treatment plan discussed with: patient  Plan details: Patient informed that from this point forward, to ensure adherence to the aforementioned plan of care, all or some of the treatment may be performed and carried out by a Physical Therapy Assistant (PTA) with supervision from a licensed Physical Therapist (PT) in accordance with [facility] Physical Therapy Practice Act.  Patient will continue to benefit from skilled physical therapy to address the functional deficits that were identified during the evaluation today. We will continue to progress the therapy program to address these functional deficits and achieve the established goals.                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  States a pulsating discomfort.  When she lays down at night, states pain is excruciating.  Has tried several sleeping positions with difficulty finding relief.      Saw MD - given a steroid pack and flexiril - states she is finally at a level where pain is not excruciating but still having diff with laying down.  Has began walking on treadmill.      Discomfort with sitting     2/5 Update: Pt states she is improving - states she is up to 25-30 minutes  of walk/run on treadmill several days weekly.  However, states she still continues to feel the pain, ableit less intense.  One episode of pulling hay bails did exacerbate the pain more than anything else.  Feels her core is stronger and she is progressing her hip pain.  Better tolerance to job demands both at farm and at work  Quality of life: good    Patient Goals  Patient goals for therapy: decreased edema, return to sport/leisure activities and independence with ADLs/IADLs    Pain  Quality: sharp  Exacerbated by: laying down at night.    Life stress: demands at farm      Diagnostic Tests  X-ray: abnormal  Treatments  Previous treatment: chiropractic, massage and medication (some relief with chiro manip)  Current treatment: medication        Objective     Static Posture     Pelvis   Pelvis (Left): Obliquity.     Comments  Left pelvic obliquity reduced with a MET pelvic clearing     Active Range of Motion     Lumbar   Flexion:  WFL  Extension: 10 (L sided discomfrot) degrees  with pain  Left Hip   Flexion: WFL    Right Hip   Flexion: WFL    Additional Active Range of Motion Details  HS 90/90  R: full rom   L: full rom    Strength/Myotome Testing     Left Hip   Planes of Motion   Flexion: 4+  Extension: 4+  Abduction: 4+  Adduction: 4+    Right Hip   Planes of Motion   Flexion: 4+  Extension: 4+  Abduction: 5  Adduction: 5    Muscle Activation   Patient able to activate left transverse abdominals, left multifidus, right transverse abdominals and right multifidus.              Precautions: none      Date 2/5 1/9 1/13 1/16 1/23   FOTO 70 TS  67 TS     Manuals        STM L glute and piriformis in R sidelying     ds   L hip LAD        Pelvic clearing MET (PRN)     TS   Neuro Re-Ed        PPT  20x       Clamshell        Supine march        Adduction squeeze        Leg Press    40# 30x  40# 30x 40# 30x    CC Walkouts    P4 5x  P4 5x P4 5x    Hip airplanes 2/10    2/10   Ther Ex        Obj. Updates  TS       Figure 4  "3/15\" 3x15\" 3x15\"  3x15\" home   HS 90/90 20 ea  20x  ea 20x 20x  home   Bridge 15 with hold 3\"  15x  c add      Supine glute stretch 3/15\"  3x 15\" 3x15\"  3x15\" home   DKTC/LTR c PB   Y  20x ea  Y 20x ea Y 20x ea   Nu Step 8m L5  8m L5 8 min L5  8m L5 NV   Paloff Press  P2 15 ea   P2 20x ea P2 20x ea P2 20x ea   Back Extension    15# 20x 15# 20x  20# 20x   Hip abd on MH 35# 3/10  30# 15x ea  30# 15/10 xea 30# 20x ea   Prone press 2/10 rest on elbows    15x  post           Ther Activity                        Gait Training                        Modalities        HP                                       "

## 2025-02-06 ENCOUNTER — APPOINTMENT (OUTPATIENT)
Dept: PHYSICAL THERAPY | Facility: CLINIC | Age: 50
End: 2025-02-06
Payer: COMMERCIAL

## 2025-02-10 ENCOUNTER — OFFICE VISIT (OUTPATIENT)
Dept: PHYSICAL THERAPY | Facility: CLINIC | Age: 50
End: 2025-02-10
Payer: COMMERCIAL

## 2025-02-10 DIAGNOSIS — G57.02 PIRIFORMIS SYNDROME OF LEFT SIDE: ICD-10-CM

## 2025-02-10 DIAGNOSIS — M70.62 GREATER TROCHANTERIC BURSITIS OF LEFT HIP: Primary | ICD-10-CM

## 2025-02-10 PROCEDURE — 97112 NEUROMUSCULAR REEDUCATION: CPT

## 2025-02-10 PROCEDURE — 97110 THERAPEUTIC EXERCISES: CPT

## 2025-02-10 NOTE — PROGRESS NOTES
"Daily Note     Today's date: 2/10/2025  Patient name: Heather Reaves  : 1975  MRN: [id number]  Referring provider: Carlos Ruano DO  Dx:   Encounter Diagnosis     ICD-10-CM    1. Greater trochanteric bursitis of left hip  M70.62       2. Piriformis syndrome of left side  G57.02                      Subjective: Pt notes some mild  soreness       Objective: See treatment diary below      Assessment: Tolerated treatment well. Patient exhibited good technique with therapeutic exercises      Plan: Continue per plan of care.      Precautions: none      Date 2/5  2/10 1/13 1/16 1/23   FOTO 70 TS  67 TS     Manuals        STM L glute and piriformis in R sidelying     ds   L hip LAD        Pelvic clearing MET (PRN)     TS   Neuro Re-Ed        PPT        Clamshell        Supine march        Adduction squeeze        Leg Press    40# 30x  40# 30x 40# 30x    CC Walkouts   P4 5x P4 5x  P4 5x P4 5x    Hip airplanes 2/10 2/10   2/10   Ther Ex        Obj. Updates  TS       Figure 4 3/15\" 3x15\" 3x15\"  3x15\" home   HS 90/90 20 ea  20x  ea 20x 20x  home   Bridge c add 15 with hold 3\"  15x  c add 15x 3\"      Supine glute stretch 3/15\"  3x 15\" 3x15\"  3x15\" home   DKTC/LTR c PB     Y 20x ea Y 20x ea   Nu Step 8m L5  8m L5 8 min L5  8m L5 NV   Paloff Press  P2 15 ea  P2 15 ea P2 20x ea P2 20x ea P2 20x ea   Back Extension    15# 20x 15# 20x  20# 20x   Hip abd on MH 35# 3/10 35# 3/10 30# 15x ea  30# 15/10 xea 30# 20x ea   Prone press 2/10 rest on elbows    15x  post           Ther Activity                        Gait Training                        Modalities        HP                       "

## 2025-02-13 ENCOUNTER — OFFICE VISIT (OUTPATIENT)
Dept: PHYSICAL THERAPY | Facility: CLINIC | Age: 50
End: 2025-02-13
Payer: COMMERCIAL

## 2025-02-13 DIAGNOSIS — G57.02 PIRIFORMIS SYNDROME OF LEFT SIDE: Primary | ICD-10-CM

## 2025-02-13 DIAGNOSIS — M70.62 GREATER TROCHANTERIC BURSITIS OF LEFT HIP: ICD-10-CM

## 2025-02-13 PROCEDURE — 97110 THERAPEUTIC EXERCISES: CPT

## 2025-02-13 NOTE — PROGRESS NOTES
"Daily Note     Today's date: 2025  Patient name: Heather Reaves  : 1975  MRN: [id number]  Referring provider: Carlos Ruano DO  Dx:   Encounter Diagnosis     ICD-10-CM    1. Piriformis syndrome of left side  G57.02       2. Greater trochanteric bursitis of left hip  M70.62                      Subjective: Pt with no new complaints, continues to have mild nagging pain in L hip      Objective: See treatment diary below      Assessment: Tolerated treatment well. Patient exhibited good technique with therapeutic exercises.  Sees MD next week.  Added HHR and multihip flex/abd/ext      Plan: Continue per plan of care.      Precautions: none      Date 2/5  2/10 2/13 1/16 1/23   FOTO 70 TS       Manuals        STM L glute and piriformis in R sidelying     ds   L hip LAD        Pelvic clearing MET (PRN)     TS   Neuro Re-Ed        PPT        Clamshell        Supine march        Leg Press     40# 30x 40# 30x    CC Walkouts   P4 5x  P4 5x P4 5x    Hip airplanes 2/10 2/10 3/10   2/10   Ther Ex        Obj. Updates  TS       Figure 4 3/15\" 3x15\" 3/15 3x15\" home   HS 90/90 20 ea  20x  ea 20x 20x  home   Bridge c add 15 with hold 3\"  15x  c add      Supine glute stretch 3/15\"  3x 15\" 3/15:\" 3x15\" home   DKTC/LTR c PB     Y 20x ea Y 20x ea   Nu Step 8m L5  8m L5 8m L5  8m L5 NV   Paloff Press  P2 15 ea  P2 15 ea  P2 20x ea P2 20x ea   Back Extension     15# 20x  20# 20x   Hip abd on MH 35# 3/10 35# 3/10 35# 3/10  30# 15/10 xea 30# 20x ea   Prone press 10 rest on elbows  HHR 10x   15x  post           Ther Activity                        Gait Training                        Modalities        HP                         "

## 2025-02-19 ENCOUNTER — OFFICE VISIT (OUTPATIENT)
Dept: OBGYN CLINIC | Facility: CLINIC | Age: 50
End: 2025-02-19
Payer: COMMERCIAL

## 2025-02-19 ENCOUNTER — APPOINTMENT (OUTPATIENT)
Dept: RADIOLOGY | Facility: MEDICAL CENTER | Age: 50
End: 2025-02-19
Payer: COMMERCIAL

## 2025-02-19 VITALS
HEART RATE: 86 BPM | BODY MASS INDEX: 27.23 KG/M2 | HEIGHT: 70 IN | WEIGHT: 190.2 LBS | RESPIRATION RATE: 16 BRPM | OXYGEN SATURATION: 99 % | TEMPERATURE: 98.1 F

## 2025-02-19 DIAGNOSIS — G57.02 PIRIFORMIS SYNDROME OF LEFT SIDE: ICD-10-CM

## 2025-02-19 DIAGNOSIS — M51.362 DEGENERATION OF INTERVERTEBRAL DISC OF LUMBAR REGION WITH DISCOGENIC BACK PAIN AND LOWER EXTREMITY PAIN: Primary | ICD-10-CM

## 2025-02-19 DIAGNOSIS — M70.62 GREATER TROCHANTERIC BURSITIS OF LEFT HIP: ICD-10-CM

## 2025-02-19 PROCEDURE — 99243 OFF/OP CNSLTJ NEW/EST LOW 30: CPT | Performed by: STUDENT IN AN ORGANIZED HEALTH CARE EDUCATION/TRAINING PROGRAM

## 2025-02-19 PROCEDURE — 73502 X-RAY EXAM HIP UNI 2-3 VIEWS: CPT

## 2025-02-19 RX ORDER — SCOPOLAMINE 1 MG/3D
PATCH, EXTENDED RELEASE TRANSDERMAL
COMMUNITY
Start: 2025-02-15

## 2025-02-19 NOTE — PROGRESS NOTES
Name: Heather Reaves      : 1975      MRN: [id number]  Encounter Provider: Wilder Arzola MD  Encounter Date: 2025   Encounter department: [facility]  :  Assessment & Plan  Piriformis syndrome of left side  49-year-old female 3 months status post back pain.  Initial injury lifting injury that time had left lower back pain posterior hip pain with symptoms down to the posterior calf.  After steroid taper Improved and generally her low back pain has improved with anti-inflammatory medication has had many months of physical therapy however continues to be unable to progress her activities back to what she needs to do for her at work.  Lumbar spine with significant degenerative and scoliotic changes hip x-rays with mild osteoarthritis.  Exam more consistent with lumbar spine etiology of symptomatology possible piriformis syndrome.  At this point she has reached the maximum benefit with physical therapy and oral medications therefore I am referring her to my colleague Dr. Yuan and spine pain for further evaluation and possible treatment.  She can follow-up with me as needed in the future.  Orders:    Ambulatory Referral to Orthopedic Surgery    XR hip/pelv 2-3 vws left if performed; Future    Ambulatory referral to Spine & Pain Management; Future    Degeneration of intervertebral disc of lumbar region with discogenic back pain and lower extremity pain    Orders:    Ambulatory referral to Spine & Pain Management; Future        History of Present Illness   HPI  Heather Reaves is a 49 y.o. female who presents with left hip pain. 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 Patient states that she had a few days of discomfort where she could not get comfortable however that has since improved. She states that she has a nagging pain into the left hip, left buttock, and  "left sided lower back. Patient has completed 7 weeks of physical therapy with improvement however not complete relief. 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     History obtained from: patient    Review of Systems   Constitutional: Negative.    HENT: Negative.     Eyes: Negative.    Respiratory: Negative.     Gastrointestinal: Negative.    Genitourinary: Negative.    Musculoskeletal:  Positive for back pain and myalgias. Negative for arthralgias, gait problem, joint swelling, neck pain and neck stiffness.   Skin: Negative.    Neurological: Negative.    Psychiatric/Behavioral: Negative.            Objective   Pulse 86   Temp 98.1 °F (36.7 °C) (Temporal)   Resp 16   Ht 5' 10\" (1.778 m)   Wt 86.3 kg (190 lb 3.2 oz)   SpO2 99%   BMI 27.29 kg/m²      Physical Exam  left Hip -  Patient presents with no anatomical deformity  Ambulates with steady gait pattern  Uses No assistive devices  TTP over  piriformis / glute medius  ROM: WNL without pain in seated IR, seated ER, seated hip flexion  Smooth passive circumduction   MMT: 5/5 throughout  Knee flexor and extensor mechanism intact  Ankle DF and PF mechanism intact  - Leg Length Discrepancy   - LOGAN, - FADIR  - Log roll  - [facility]  2+ TP and DP pulses with brisk capillary refill to the toes  Sural, saphenous, tibial, superficial and deep peroneal motor and sensory distribution intact  Sensation to light touch     Imaging reviewed:  AP pelvis and 2 views left hip today were independently reviewed and demonstrate minimal to mild degenerative changes at the hip joint with early source feels sclerosis, there is no significant joint space narrowing osteophyte formation or subchondral cyst    Lumbar spine x-rays from December 2024 were independently reviewed and demonstrate significant degenerative and scoliotic changes in the lumbar " spine

## 2025-02-20 ENCOUNTER — OFFICE VISIT (OUTPATIENT)
Dept: PHYSICAL THERAPY | Facility: CLINIC | Age: 50
End: 2025-02-20
Payer: COMMERCIAL

## 2025-02-20 DIAGNOSIS — G57.02 PIRIFORMIS SYNDROME OF LEFT SIDE: ICD-10-CM

## 2025-02-20 DIAGNOSIS — M70.62 GREATER TROCHANTERIC BURSITIS OF LEFT HIP: Primary | ICD-10-CM

## 2025-02-20 PROCEDURE — 97110 THERAPEUTIC EXERCISES: CPT

## 2025-02-20 NOTE — PROGRESS NOTES
"Daily Note     Today's date: 2025  Patient name: Heather Reaves  : 1975  MRN: [id number]  Referring provider: Carlos Ruano DO  Dx:   Encounter Diagnosis     ICD-10-CM    1. Greater trochanteric bursitis of left hip  M70.62       2. Piriformis syndrome of left side  G57.02                      Subjective: Pt states she had follow up with MD.  Referral to Dr. Yuan for further assessment, scheduled 3/17       Objective: See treatment diary below      Assessment: Tolerated treatment well. Patient exhibited good technique with therapeutic exercises.  Reviewed exercises pt is to continue with.  Discussed beginning use of inversion table in conjunction.       Plan: Continue per plan of care.      Precautions: none      Date 2/5  2/10 2/13 2/20 1/23   FOTO 70 TS       Manuals        STM L glute and piriformis in R sidelying     ds   L hip LAD        Pelvic clearing MET (PRN)     TS   Neuro Re-Ed        PPT        Clamshell        Supine march        Leg Press      40# 30x    CC Walkouts   P4 5x   P4 5x    Hip airplanes 2/10 2/10 3/10   2/10   Ther Ex        education    Review of xray, MD follow up    Obj. Updates  TS       Figure 4 3/15\" 3x15\" 3/15  home   HS 90/90 20 ea  20x  ea 20x  home   Bridge c add 15 with hold 3\"  15x  c add      Supine glute stretch 3/15\"  3x 15\" 3/15:\" 3/15\"  home   DKTC/LTR c PB      Y 20x ea   Nu Step 8m L5  8m L5 8m L5   NV   Paloff Press  P2 15 ea  P2 15 ea   P2 20x ea   Back Extension      20# 20x   Hip abd on MH 35# 3/10 35# 3/10 35# 3/10   30# 20x ea   Prone press 2/10 rest on elbows  HHR 10x  HHR 10x  15x  post   Side glide at wall    To correct R lumbar scoliosis            Ther Activity                        Gait Training                        Modalities        HP                           "

## 2025-03-03 ENCOUNTER — CLINICAL SUPPORT (OUTPATIENT)
Age: 50
End: 2025-03-03
Payer: COMMERCIAL

## 2025-03-03 ENCOUNTER — OFFICE VISIT (OUTPATIENT)
Dept: PHYSICAL THERAPY | Facility: CLINIC | Age: 50
End: 2025-03-03
Payer: COMMERCIAL

## 2025-03-03 ENCOUNTER — APPOINTMENT (OUTPATIENT)
Dept: LAB | Facility: MEDICAL CENTER | Age: 50
End: 2025-03-03
Payer: COMMERCIAL

## 2025-03-03 ENCOUNTER — TELEPHONE (OUTPATIENT)
Age: 50
End: 2025-03-03

## 2025-03-03 VITALS
TEMPERATURE: 98 F | WEIGHT: 186.5 LBS | OXYGEN SATURATION: 99 % | DIASTOLIC BLOOD PRESSURE: 86 MMHG | BODY MASS INDEX: 26.7 KG/M2 | SYSTOLIC BLOOD PRESSURE: 124 MMHG | HEIGHT: 70 IN | HEART RATE: 78 BPM

## 2025-03-03 DIAGNOSIS — G57.02 PIRIFORMIS SYNDROME OF LEFT SIDE: Primary | ICD-10-CM

## 2025-03-03 DIAGNOSIS — E66.3 OVERWEIGHT: Primary | ICD-10-CM

## 2025-03-03 DIAGNOSIS — Z79.899 MEDICATION MANAGEMENT: ICD-10-CM

## 2025-03-03 DIAGNOSIS — E66.3 OVERWEIGHT: ICD-10-CM

## 2025-03-03 DIAGNOSIS — M70.62 GREATER TROCHANTERIC BURSITIS OF LEFT HIP: ICD-10-CM

## 2025-03-03 DIAGNOSIS — E66.3 OVERWEIGHT WITH BODY MASS INDEX (BMI) OF 26 TO 26.9 IN ADULT: ICD-10-CM

## 2025-03-03 LAB
ALBUMIN SERPL BCG-MCNC: 4.2 G/DL (ref 3.5–5)
ALP SERPL-CCNC: 49 U/L (ref 34–104)
ALT SERPL W P-5'-P-CCNC: 18 U/L (ref 7–52)
ANION GAP SERPL CALCULATED.3IONS-SCNC: 9 MMOL/L (ref 4–13)
AST SERPL W P-5'-P-CCNC: 16 U/L (ref 13–39)
BILIRUB SERPL-MCNC: 0.64 MG/DL (ref 0.2–1)
BUN SERPL-MCNC: 13 MG/DL (ref 5–25)
CALCIUM SERPL-MCNC: 9.2 MG/DL (ref 8.4–10.2)
CHLORIDE SERPL-SCNC: 104 MMOL/L (ref 96–108)
CO2 SERPL-SCNC: 26 MMOL/L (ref 21–32)
CREAT SERPL-MCNC: 0.78 MG/DL (ref 0.6–1.3)
GFR SERPL CREATININE-BSD FRML MDRD: 89 ML/MIN/1.73SQ M
GLUCOSE P FAST SERPL-MCNC: 95 MG/DL (ref 65–99)
POTASSIUM SERPL-SCNC: 4 MMOL/L (ref 3.5–5.3)
PROT SERPL-MCNC: 7.2 G/DL (ref 6.4–8.4)
SODIUM SERPL-SCNC: 139 MMOL/L (ref 135–147)

## 2025-03-03 PROCEDURE — 97112 NEUROMUSCULAR REEDUCATION: CPT

## 2025-03-03 PROCEDURE — 80053 COMPREHEN METABOLIC PANEL: CPT

## 2025-03-03 PROCEDURE — 97110 THERAPEUTIC EXERCISES: CPT

## 2025-03-03 PROCEDURE — S9470 NUTRITIONAL COUNSELING, DIET: HCPCS | Performed by: DIETITIAN, REGISTERED

## 2025-03-03 PROCEDURE — 36415 COLL VENOUS BLD VENIPUNCTURE: CPT

## 2025-03-03 NOTE — PROGRESS NOTES
Weight Management Medical Nutrition Assessment  Heather presented today for follow-up meal-planning session and vitals check. She is pursuing conservative program. Today she weighed 186.5# which reflects a loss of 7.1# since 9/23/24 appointment with MAGY and 20.7# since beginning with weight management. She stated she has been exercising- weight training 6 days per week and using treadmill (exceeding her goal set last session). Works out 9:30 PM to 10:00-10:15 PM; using videos as a guide. Going to therapy for lower back and they are giving her exercises as well. Taking 1/2 phentertmine in the morning and 1/2 around lunch time which has been helping with cravings. No new labs, but noted she just had blood drawn. Vitals checked per [facility] provider's request (done by MA)- will notify provider.     Started using protein powder in the morning at times and has been exploring different ways to make desserts more healthful; provided Heather with various websites for reference. Limiting Girl  cookies- may have 2. Discussed the importance of eating slowly as a way to aid in portion control. Suggested she be mindful of her portions at dinner and on the weekends             Patient seen by Medical Provider in past 6 months:  yes  Requested to schedule appointment with Medical Provider: No      Anthropometric Measurements  Start Weight (#): 207.2# 7/22/24 with [facility] provider  Current Weight (#): 186.5#  TBW % Change from start weight: 10%  Ideal Body Weight (#):150# (68.2 kg)  Goal Weight (#):165#  Highest:212#   Lowest:165#    Weight Loss History  Previous weight loss attempts: none identified     Food and Nutrition Related History  Wake up: 6 AM   Bed Time:10:30 PM; generally sleeps well; no night-time eating    Food Recall  Breakfast:6:30-6:45 AM-  makes breakfast; 1 fried egg, piece of meat such as dawson or scrapple, 1 slice toast or Eng muffin or wrap; eggs with meat, with butter; having less than in the past; may use  some protein powder in the AM   Snack:9 AM- Greek yogurt (20 g), sometimes chocolate dusted almonds  Lunch:12:00 PM- packing lunch more often: chili OR chicken salad with gray with lettuce, possibly with crackers OR soup OR  slice pizza occ  Snack: on ride home, may have marcella dusted almonds    Dinner: 6:15-6:30 PM- meat, vegetable, starch, ; stated eating more veggies; thinks portions sizes not well-controlled  Snack: not usually       Beverages: water- at least 4 bottles, hot tea- 12 oz with honey, no coffee, no soda, ETOH occ only socially  Volume of beverage intake: >=80 oz    Weekends: usually not, but due to preparing her home for sale, it is less regimented, eating quicker things   Cravings: occ struggles with sweets, but thinks this is more of a habit  Trouble area of day:when gets home from work    Frequency of Eating out: cut back from 5 days per week to 3x/week  Food restrictions:stated she has allergies such as wheat and peanut, but noted she has no reaction despite having a panel done  Cooking: self   Food Shopping: self    Physical Activity Intake  Activity: exercising 6 days per week (see narrative above for detail) and is busy at work and on farm, preparing house for sale  Frequency:daily  Physical limitations/barriers to exercise: none    Estimated Needs  Energy  SECA: BMR:n/a      X 1.3 -1000 =  [place] [address] [employer] Needs: BMR: 1551   1-2# loss weekly sedentary:  861-1361             1-2# loss weekly lightly active:4945-6530  1-2# loss weekly daily or intense exercise  2992-1752 (Calculated 3/3/25)  Maintenance calories for sedentary activity level: 1861  Protein: g      (1.2-1.5g/kg IBW)  Total Fluid: 93 oz     (Ralph-Segar Method)  Free Fluid: 74 oz (Larissa-Segar Method - 20%)      Nutrition Diagnosis  Yes;    Overweight/obesity  related to Excess energy intake as evidenced by  BMI more than normative standard for age and sex (overweight 25-29.9)       Nutrition  Intervention    Nutrition Prescription  Calories:7665-0247  Protein: g  Fluid:>= 74-93 oz    Meal Plan (Omkar/Pro/Carb)  Previously provided Heather with 1313-4175 kcal sample meal plan which remains appropriate    Diet Education:    Calorie controlled menu  Lean protein food choices  Healthy snack options  Food journaling tips        Nutrition Counseling:  Strategies: meal planning, portion sizes, healthy snack choices, hydration, fiber intake, protein intake, exercise, food journal      Monitoring and Evaluation:  Evaluation criteria:  Energy Intake  Meet protein needs  Maintain adequate hydration  Monitor weekly weight  Meal planning/preparation  Food journal   Decreased portions at mealtimes and snacks  Physical activity     Barriers to learning:none  Readiness to change: Action:  (Changing behavior)  Comprehension: very good  Expected Compliance: very good

## 2025-03-03 NOTE — PROGRESS NOTES
"Daily Note     Today's date: 3/3/2025  Patient name: Heather Reaves  : 1975  MRN: [id number]  Referring provider: Carlos Ruano DO  Dx:   Encounter Diagnosis     ICD-10-CM    1. Piriformis syndrome of left side  G57.02       2. Greater trochanteric bursitis of left hip  M70.62                      Subjective: Pt able to throw 3 bails of hay this week but still stays conscious and apprehensive of SIJ      Objective: See treatment diary below      Assessment: Tolerated treatment well. Patient exhibited good technique with therapeutic exercises and would benefit from continued PT      Plan: Continue per plan of care.  Progress note during next visit.      Precautions: none      Date 2/5  2/10 2/13 2/20 3/3   FOTO 70 TS       Manuals        STM L glute and piriformis in R sidelying        L hip LAD        Pelvic clearing MET (PRN)        Neuro Re-Ed        Leg Press      SL, 40# 2/15 ea    CC Walkouts   P4 5x   P5 5x    Hip airplanes 2/10 2/10 3/10      Ther Ex        education    Review of xray, MD follow up    Obj. Updates  TS       Bridge c add 15 with hold 3\"  15x  c add      Supine glute stretch 3/15\"  3x 15\" 3/15:\" 315\"     Nu Step 8m L5  8m L5 8m L5   8m L5    Hip abd/ext/flex on MH 35# 3/10 35# 3/10 35# 3/10   30# 15 ea    HHR 2/10 rest on elbows  HHR 10x  HHR 10x  15x    Side glide at wall    To correct R lumbar scoliosis 10 ea           Ther Activity                        Gait Training                        Modalities        HP                             "

## 2025-03-03 NOTE — TELEPHONE ENCOUNTER
Received notification from [facility] the pt's Phentermine needs a Prior Authorization.    Please initiate

## 2025-03-03 NOTE — TELEPHONE ENCOUNTER
Please see scanned prior auth for Phentermine HCL received from [facility]. Thank you.

## 2025-03-10 ENCOUNTER — RESULTS FOLLOW-UP (OUTPATIENT)
Age: 50
End: 2025-03-10

## 2025-03-10 ENCOUNTER — TELEPHONE (OUTPATIENT)
Age: 50
End: 2025-03-10

## 2025-03-10 NOTE — TELEPHONE ENCOUNTER
"----- Message from Yeni BENITEZ sent at 3/3/2025  9:34 PM EST -----  Regarding: vitals  3/3/2025   8:00 AM    Blood Pressure 124/86  BP Location Right arm  Patient Position Sitting  Pulse 78  Temperature 98 °F (36.7 °C)  Temp Source Temporal  Weight - Scale 84.6 kg (186 lb 8 oz)  Height 5' 10\" (1.778 m)  SpO2 99 %  "

## 2025-03-12 ENCOUNTER — EVALUATION (OUTPATIENT)
Dept: PHYSICAL THERAPY | Facility: CLINIC | Age: 50
End: 2025-03-12
Payer: COMMERCIAL

## 2025-03-12 DIAGNOSIS — G57.02 PIRIFORMIS SYNDROME OF LEFT SIDE: ICD-10-CM

## 2025-03-12 DIAGNOSIS — M70.62 GREATER TROCHANTERIC BURSITIS OF LEFT HIP: Primary | ICD-10-CM

## 2025-03-12 PROCEDURE — 97110 THERAPEUTIC EXERCISES: CPT

## 2025-03-12 NOTE — LETTER
2025    Carlos Ruano DO  [address] [facility] [address]    Patient: Heather Reaves   YOB: 1975   Date of Visit: 3/12/2025     Encounter Diagnosis     ICD-10-CM    1. Greater trochanteric bursitis of left hip  M70.62       2. Piriformis syndrome of left side  G57.02           Dear Dr. Ruano:    Thank you for your recent referral of Heather Reaves. Please review the attached evaluation summary from Heather's recent visit.     Please verify that you agree with the plan of care by signing the attached order.     If you have any questions or concerns, please do not hesitate to call.     I sincerely appreciate the opportunity to share in the care of one of your patients and hope to have another opportunity to work with you in the near future.       Sincerely,    Jin Warner, PT      Referring Provider:      I certify that I have read the below Plan of Care and certify the need for these services furnished under this plan of treatment while under my care.                    Carlos Ruano DO  [address] [facility] [address]  Via In Basket          PT Re-Evaluation     Today's date: 3/12/2025  Patient name: Heather Reaves  : 1975  MRN: [id number]  Referring provider: Carlos Ruano DO  Dx:   Encounter Diagnosis     ICD-10-CM    1. Greater trochanteric bursitis of left hip  M70.62       2. Piriformis syndrome of left side  G57.02             Start Time: 1721  Stop Time: 1800  Total time in clinic (min): 39 minutes    Assessment  Impairments: abnormal or restricted ROM, activity intolerance, impaired physical strength, pain with function, participation limitations and activity limitations  Other impairment: pelvic obliquity (reduced with MET at IE)  Symptom irritability: high    Assessment details: Heather is a 49 year old female with complaint of L lower flank and hip pain.  She presented today with a pelvic obliquity that was reduced with MET pelvic clearing.  She  "has L concavity levoscliosis and increased tone into L gluteals and deep hip musculature.  Pt with fair glute activation and pain with bridge.  She will benefit from a comprehensive program to address these deficits and improve her hip stability and core strength.     2/5 UPDATE: Zoya has been seen for 12 visits for L SIJ pain.  She is continuing to make good progress both objectively and subjectively (FOTO score).  Core strength is progressing well and patient continues to be challenged with hip strength and stability exercises.  She continues to have pain in L SIJ and sacral area, however wih significantly less rating than at IE.  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  Pt has been able to resume treadmill activity and reports sleeping better with less reliance on pain medication.  However, she may be a candidate for injection or shockwave therapy at this time given location of pain.  Please advise.  We will continue to progress function per her tolerance moving forward.     3/12 UPDATE: Heather has been seen for 15 visits for L SIJ pain.  She has made a significant improvement in her functional capacity since IE.  She has restored full ROM in all planes in hips and in lumbar spine.  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  However, she continues to report that there is a dull aching pain localized at L SIJ, made worse with hip flexion MMT and end range lumbar extension.  She has already had xrays completed without significant findings.  Believe patient would benefit from an MRI at this time to further diagnose current symptoms.  Please advise on progression of care plan.   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 MET  Decrease low back pain by 25% for improved tolerance with adls and home duties by 3-4 weeks. MET  Improve Lumbar rom to wfl for improved tolerance with adls and home duties by 3-4 weeks. MET\"    LTGs:  \"Improve FOTO score from 45 to 68 indicating improved tolerance with activities involving the low back by discharge. MET  Patient will demonstrate rom and strength to the pelvis and hips wfl for improved tolerance with adls and home duties by discharge.  NEARLY MET  Patient will be free of radicular symptoms by discharge. NEARLY MET  Pt will be able to lay on R side at night without onset of symptoms NEARLY MET  Pt will be able to sit to tolerate job demands without exacerbation of symptoms NEARLY MET      Plan  Patient would benefit from: skilled physical therapy  Planned modality interventions: cryotherapy and thermotherapy: hydrocollator packs    Planned therapy interventions: abdominal trunk stabilization, ADL retraining, body mechanics training, flexibility, functional ROM exercises, home exercise program, therapeutic exercise, therapeutic activities, stretching, strengthening, postural training, patient education, joint mobilization and manual therapy    Frequency: 2x week  Duration in weeks: 4  Plan of Care beginning date: 2/5/2025  Plan of Care expiration date: 3/5/2025  Treatment plan discussed with: patient  Plan details: Patient informed that from this point forward, to ensure adherence to the aforementioned plan of care, all or some of the treatment may be performed and carried out by a Physical Therapy Assistant (PTA) with supervision from a licensed Physical Therapist (PT) in accordance with [facility] Physical Therapy Practice Act.  Patient will continue to benefit from skilled physical therapy to address the functional deficits that were identified during the evaluation today. We will continue to progress the therapy program to address these functional " "deficits and achieve the established goals.                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  States a pulsating discomfort.  When she lays down at night, states pain is excruciating.  Has tried several sleeping positions with difficulty finding relief.      Saw MD - given a steroid pack and flexiril - states she is finally at a level where pain is not excruciating but still having diff with laying down.  Has began walking on treadmill.      Discomfort with sitting      Update: Pt states she is improving - states she is up to 25-30 minutes of walk/run on treadmill several days weekly.  However, states she still continues to feel the pain, ableit less intense.  One episode of pulling hay bails did exacerbate the pain more than anything else.  Feels her core is stronger and she is progressing her hip pain.  Better tolerance to job demands both at farm and at work    3/12 UPDATE: Pt states she is continuing to feel progress.  Last week she was able to help her  lift a turbo heater from the basement without straining SIJ.  Has been continuing with her workout progress.  States the pain/discomfort is \"just always there\".  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   Has follow up with Dr. Yuan this coming Monday.   Quality of life: good    Patient Goals  Patient goals for therapy: decreased edema, return to sport/leisure activities and independence with ADLs/IADLs    Pain  Current pain ratin  Location: L SIJ  Quality: sharp  Exacerbated by: laying down at night.    Life stress: demands at farm      Diagnostic Tests  X-ray: abnormal  Treatments  Previous treatment: chiropractic, massage and medication (some relief with chiro manip)  Current treatment: " "medication        Objective     Static Posture     Comments  Left pelvic obliquity reduced with a MET pelvic clearing     3/12 No apparent pelvic obliquity upon assessment this visit     Active Range of Motion     Lumbar   Flexion: Active lumbar flexion: hands to floor, no knee flexion.  WFL  Extension: 20 (L sided discomfrot) degrees   Left Hip   Flexion: 132 degrees WFL  Extension: 10 degrees   External rotation (90/90): Left hip active external rotation 90/90: Full.   Internal rotation (90/90): Left hip active internal rotation 90/90: Full.     Right Hip   Flexion: 131 degrees WFL  Extension: 11 degrees   External rotation (90/90): Right hip active external rotation 90/90: Full.   Internal rotation (90/90): Right hip active internal rotation 90/90: Full.     Additional Active Range of Motion Details  HS 90/90  R: full rom   L: full rom    3/12 HS 90/90 remains full     Strength/Myotome Testing     Left Hip   Planes of Motion   Flexion: 4+  Extension: 5 (holds a single leg bridge without hip drop)  Abduction: 5  Adduction: 5    Right Hip   Planes of Motion   Flexion: 4+  Extension: 5  Abduction: 5  Adduction: 5    Muscle Activation   Patient able to activate left transverse abdominals, left multifidus, right transverse abdominals and right multifidus.              Precautions: none      Date 3/12 2/10 2/13 2/20 3/3   FOTO        Manuals        STM L glute and piriformis in R sidelying        L hip LAD        Pelvic clearing MET (PRN)        Neuro Re-Ed        Leg Press      SL, 40# 2/15 ea    CC Walkouts   P4 5x   P5 5x    Hip airplanes  2/10 3/10      Ther Ex        education TS, review of sx, xray, upcoming MD visit   Review of xray, MD follow up    Obj. Updates  TS       Bridge c add  15x  c add      Supine glute stretch  3x 15\" 3/15:\" 3/15\"     Nu Step  8m L5 8m L5   8m L5    Hip abd/ext/flex on MH  35# 3/10 35# 3/10   30# 15 ea    HHR   HHR 10x  HHR 10x  15x    Side glide at wall    To correct R lumbar " scoliosis 10 ea           Ther Activity                        Gait Training                        Modalities        HP

## 2025-03-12 NOTE — PROGRESS NOTES
PT Re-Evaluation     Today's date: 3/12/2025  Patient name: Heather Reaves  : 1975  MRN: [id number]  Referring provider: Carlos Ruano DO  Dx:   Encounter Diagnosis     ICD-10-CM    1. Greater trochanteric bursitis of left hip  M70.62       2. Piriformis syndrome of left side  G57.02             Start Time: 1721  Stop Time: 1800  Total time in clinic (min): 39 minutes    Assessment  Impairments: abnormal or restricted ROM, activity intolerance, impaired physical strength, pain with function, participation limitations and activity limitations  Other impairment: pelvic obliquity (reduced with MET at IE)  Symptom irritability: high    Assessment details: Heather is a 49 year old female with complaint of L lower flank and hip pain.  She presented today with a pelvic obliquity that was reduced with MET pelvic clearing.  She has L concavity levoscliosis and increased tone into L gluteals and deep hip musculature.  Pt with fair glute activation and pain with bridge.  She will benefit from a comprehensive program to address these deficits and improve her hip stability and core strength.      UPDATE: Zoya has been seen for 12 visits for L SIJ pain.  She is continuing to make good progress both objectively and subjectively (FOTO score).  Core strength is progressing well and patient continues to be challenged with hip strength and stability exercises.  She continues to have pain in L SIJ and sacral area, however wih significantly less rating than at IE.  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  Pt has been able to resume treadmill activity and reports sleeping better with less reliance on pain medication.  However, she may be a candidate for injection or shockwave therapy at this time given location of pain.  Please advise.  We will continue to progress function per her tolerance  "moving forward.     3/12 UPDATE: Heather has been seen for 15 visits for L SIJ pain.  She has made a significant improvement in her functional capacity since IE.  She has restored full ROM in all planes in hips and in lumbar spine.  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  However, she continues to report that there is a dull aching pain localized at L SIJ, made worse with hip flexion MMT and end range lumbar extension.  She has already had xrays completed without significant findings.  Believe patient would benefit from an MRI at this time to further diagnose current symptoms.  Please advise on progression of care plan.   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 MET  Decrease low back pain by 25% for improved tolerance with adls and home duties by 3-4 weeks. MET  Improve Lumbar rom to wfl for improved tolerance with adls and home duties by 3-4 weeks. MET\"    LTGs:  \"Improve FOTO score from 45 to 68 indicating improved tolerance with activities involving the low back by discharge. MET  Patient will demonstrate rom and strength to the pelvis and hips wfl for improved tolerance with adls and home duties by discharge.  NEARLY MET  Patient will be free of radicular symptoms by discharge. NEARLY MET  Pt will be able to lay on R side at night without onset of symptoms NEARLY MET  Pt will be able to sit to tolerate job demands without exacerbation of symptoms NEARLY MET      Plan  Patient would benefit from: skilled physical therapy  Planned modality interventions: cryotherapy and thermotherapy: hydrocollator packs    Planned therapy interventions: abdominal trunk stabilization, ADL retraining, body mechanics training, flexibility, functional ROM exercises, home exercise program, therapeutic exercise, therapeutic activities, " stretching, strengthening, postural training, patient education, joint mobilization and manual therapy    Frequency: 2x week  Duration in weeks: 4  Plan of Care beginning date: 2/5/2025  Plan of Care expiration date: 3/5/2025  Treatment plan discussed with: patient  Plan details: Patient informed that from this point forward, to ensure adherence to the aforementioned plan of care, all or some of the treatment may be performed and carried out by a Physical Therapy Assistant (PTA) with supervision from a licensed Physical Therapist (PT) in accordance with [facility] Physical Therapy Practice Act.  Patient will continue to benefit from skilled physical therapy to address the functional deficits that were identified during the evaluation today. We will continue to progress the therapy program to address these functional deficits and achieve the established goals.                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  States a pulsating discomfort.  When she lays down at night, states pain is excruciating.  Has tried several sleeping positions with difficulty finding relief.      Saw MD - given a steroid pack and flexiril - states she is finally at a level where pain is not excruciating but still having diff with laying down.  Has began walking on treadmill.      Discomfort with sitting     2/5 Update: Pt states she is improving - states she is up to 25-30 minutes of walk/run on treadmill several days weekly.  However, states she still continues to feel the pain, ableit less intense.  One episode of pulling hay bails did exacerbate the pain more than anything else.  Feels her core is stronger and she is progressing her hip pain.  Better tolerance to job demands both at farm and at work    3/12 UPDATE: Pt states she is continuing to feel progress.  Last  "week she was able to help her  lift a turbo heater from the basement without straining SIJ.  Has been continuing with her workout progress.  States the pain/discomfort is \"just always there\".  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   Has follow up with Dr. Yuan this coming Monday.   Quality of life: good    Patient Goals  Patient goals for therapy: decreased edema, return to sport/leisure activities and independence with ADLs/IADLs    Pain  Current pain ratin  Location: L SIJ  Quality: sharp  Exacerbated by: laying down at night.    Life stress: demands at farm      Diagnostic Tests  X-ray: abnormal  Treatments  Previous treatment: chiropractic, massage and medication (some relief with chiro manip)  Current treatment: medication        Objective     Static Posture     Comments  Left pelvic obliquity reduced with a MET pelvic clearing     3/12 No apparent pelvic obliquity upon assessment this visit     Active Range of Motion     Lumbar   Flexion: Active lumbar flexion: hands to floor, no knee flexion.  WFL  Extension: 20 (L sided discomfrot) degrees   Left Hip   Flexion: 132 degrees WFL  Extension: 10 degrees   External rotation (90/90): Left hip active external rotation 90/90: Full.   Internal rotation (90/90): Left hip active internal rotation 90/90: Full.     Right Hip   Flexion: 131 degrees WFL  Extension: 11 degrees   External rotation (90/90): Right hip active external rotation 90/90: Full.   Internal rotation (90/90): Right hip active internal rotation 90/90: Full.     Additional Active Range of Motion Details  HS 90/90  R: full rom   L: full rom    3/12 HS 90/90 remains full     Strength/Myotome Testing     Left Hip   Planes of Motion   Flexion: 4+  Extension: 5 (holds a single leg bridge without hip drop)  Abduction: 5  Adduction: 5    Right Hip   Planes of Motion   Flexion: 4+  Extension: 5  Abduction: " "5  Adduction: 5    Muscle Activation   Patient able to activate left transverse abdominals, left multifidus, right transverse abdominals and right multifidus.              Precautions: none      Date 3/12 2/10 2/13 2/20 3/3   FOTO        Manuals        STM L glute and piriformis in R sidelying        L hip LAD        Pelvic clearing MET (PRN)        Neuro Re-Ed        Leg Press      SL, 40# 2/15 ea    CC Walkouts   P4 5x   P5 5x    Hip airplanes  2/10 3/10      Ther Ex        education TS, review of sx, xray, upcoming MD visit   Review of xray, MD follow up    Obj. Updates  TS       Bridge c add  15x  c add      Supine glute stretch  3x 15\" 3/15:\" 3/15\"     Nu Step  8m L5 8m L5   8m L5    Hip abd/ext/flex on MH  35# 3/10 35# 3/10   30# 15 ea    HHR   HHR 10x  HHR 10x  15x    Side glide at wall    To correct R lumbar scoliosis 10 ea           Ther Activity                        Gait Training                        Modalities        HP                       "

## 2025-03-17 ENCOUNTER — CONSULT (OUTPATIENT)
Dept: PAIN MEDICINE | Facility: CLINIC | Age: 50
End: 2025-03-17
Payer: COMMERCIAL

## 2025-03-17 ENCOUNTER — APPOINTMENT (OUTPATIENT)
Dept: PHYSICAL THERAPY | Facility: CLINIC | Age: 50
End: 2025-03-17
Payer: COMMERCIAL

## 2025-03-17 VITALS
WEIGHT: 185.4 LBS | TEMPERATURE: 98 F | OXYGEN SATURATION: 99 % | HEIGHT: 70 IN | BODY MASS INDEX: 26.54 KG/M2 | HEART RATE: 59 BPM | RESPIRATION RATE: 16 BRPM

## 2025-03-17 DIAGNOSIS — G89.4 CHRONIC PAIN SYNDROME: ICD-10-CM

## 2025-03-17 DIAGNOSIS — G57.02 PIRIFORMIS SYNDROME OF LEFT SIDE: ICD-10-CM

## 2025-03-17 DIAGNOSIS — M79.18 MYOFASCIAL PAIN SYNDROME: ICD-10-CM

## 2025-03-17 DIAGNOSIS — M51.16 LUMBAR DISC DISEASE WITH RADICULOPATHY: Primary | ICD-10-CM

## 2025-03-17 DIAGNOSIS — M51.362 DEGENERATION OF INTERVERTEBRAL DISC OF LUMBAR REGION WITH DISCOGENIC BACK PAIN AND LOWER EXTREMITY PAIN: ICD-10-CM

## 2025-03-17 PROCEDURE — 99244 OFF/OP CNSLTJ NEW/EST MOD 40: CPT | Performed by: ANESTHESIOLOGY

## 2025-03-17 NOTE — PROGRESS NOTES
Assessment:  1. Lumbar disc disease with radiculopathy    2. Piriformis syndrome of left side    3. Degeneration of intervertebral disc of lumbar region with discogenic back pain and lower extremity pain    4. Chronic pain syndrome        Plan:  Patient is a 49-year-old female complains of left-sided back pain with chronic pain syndrome secondary to piriformis syndrome presents to office for initial consultation.  Patient has done physical therapy for piriformis syndrome and notes no significant improvement of pain.  X-ray left hip within normal limits.  X-ray lumbar spine 6 months shows partial narrowing of the L3 disc space  with endplate osteophytes noted at multiple levels.        1.  We will order a MRI of the lumbar spine to better assess the discogenic pathology behind patient's current presentation.  Patient has done physical therapy which has helped with some of the radicular symptoms however she continues to have significant left-sided low back pain with occasional radiation down to the lower extremity in the L5 nerve root distribution.  2.  We will provide patient with a adjustment to her physical therapy which would include Graston to help release a muscle spasm in the gluteus region  3.  Follow-up in 1 month to review diagnostic imaging and plan interventional management    History of Present Illness:    The patient is a 49 y.o. female who presents for consultation in regards to Back Pain (Consult/).  Symptoms have been present for 4 months. Symptoms began without any precipitating injury or trauma. Pain is reported to be 4 on the numeric rating scale.  Symptoms are felt nearly constantly and worst in the no typical pattern.  Symptoms are characterized as dull/aching and pressure-like.  Symptoms are associated with no weakness.  Aggravating factors include lying down, sitting, walking, exercise, and relaxation.  Relieving factors include nothing.  No change in symptoms with kneeling, standing, bending,  leaning forward, leaning bckward, turning the head, coughing/sneezing, and bowel movements.  Treatments that have been helpful include chiropractic manipulation and TENS unit. physical therapy and home exercise have provided no relief.  Medications to relieve symptoms include naproxen, ibuprofen.    Review of Systems:    Review of Systems   Constitutional: Negative.    HENT: Negative.     Eyes: Negative.    Respiratory: Negative.     Cardiovascular: Negative.    Gastrointestinal: Negative.    Endocrine: Negative.    Genitourinary: Negative.    Musculoskeletal:         Muscle pain   Skin: Negative.    Allergic/Immunologic: Negative.    Neurological: Negative.    Hematological: Negative.    Psychiatric/Behavioral: Negative.             Past Medical History:   Diagnosis Date    Abnormal Pap smear of cervix     Allergic rhinitis     Resolved 7/14/2017     Anxiety     Generalized anxiety disorder     Resolved 7/14/2017        Past Surgical History:   Procedure Laterality Date    COLPOSCOPY      NO PAST SURGERIES         Family History   Problem Relation Age of Onset    Cancer Mother     Colon cancer Mother     Colon polyps Mother     Uterine cancer Mother     Heart disease Father     No Known Problems Sister     No Known Problems Daughter     No Known Problems Daughter     Colon cancer Maternal Grandmother     No Known Problems Paternal Grandmother     Colon cancer Cousin     No Known Problems Maternal Aunt     No Known Problems Maternal Aunt     No Known Problems Maternal Aunt        Social History     Occupational History    Not on file   Tobacco Use    Smoking status: Former     Types: Cigarettes    Smokeless tobacco: Never   Vaping Use    Vaping status: Never Used   Substance and Sexual Activity    Alcohol use: Not Currently     Comment: occasional     Drug use: Never    Sexual activity: Yes     Partners: Male     Birth control/protection: Rhythm         Current Outpatient Medications:     albuterol (PROVENTIL  "HFA,VENTOLIN HFA) 90 mcg/act inhaler, Inhale 2 puffs every 6 (six) hours as needed for wheezing, Disp: 18 g, Rfl: 0    cyclobenzaprine (FLEXERIL) 10 mg tablet, Take 1 tablet (10 mg total) by mouth daily at bedtime as needed for muscle spasms, Disp: 30 tablet, Rfl: 0    dextromethorphan-guaifenesin (MUCINEX DM)  MG per 12 hr tablet, Take 1 tablet by mouth every 12 (twelve) hours (Patient not taking: Reported on 12/4/2024), Disp: , Rfl:     EPINEPHrine (EPIPEN) 0.3 mg/0.3 mL SOAJ, Inject 0.3 mL (0.3 mg total) into a muscle once for 1 dose PRN bee sting, Disp: 0.6 mL, Rfl: 0    fluticasone (FLONASE) 50 mcg/act nasal spray, 2 sprays into each nostril daily, Disp: 16 g, Rfl: 3    ibuprofen (MOTRIN) 600 mg tablet, Take 1 tablet (600 mg total) by mouth every 8 (eight) hours as needed for moderate pain, Disp: 90 tablet, Rfl: 1    ketoconazole (NIZORAL) 2 % shampoo, Apply 1 Application topically 2 (two) times a week for 30 doses, Disp: 120 mL, Rfl: 5    Magnesium Oxide 400 MG CAPS, Take 1 tablet 1 to 2 times daily with onset of menstrual symptoms, Disp: , Rfl: 0    phentermine (ADIPEX-P) 37.5 MG tablet, Take 1 tablet (37.5 mg total) by mouth every morning Before breakfast or 1-2 hours after, Disp: 30 tablet, Rfl: 1    scopolamine (TRANSDERM-SCOP) 1 mg/3 days TD 72 hr patch, PLACE 1 PATCH ON THE SKIN OVER 72 HOURS EVERY THIRD DAY, Disp: , Rfl:     Allergies   Allergen Reactions    Bee Venom Anaphylaxis    Food Facial Swelling     Lobster       Physical Exam:    Pulse 59   Temp 98 °F (36.7 °C) (Temporal)   Resp 16   Ht 5' 10\" (1.778 m)   Wt 84.1 kg (185 lb 6.4 oz)   SpO2 99%   BMI 26.60 kg/m²     Constitutional: normal, well developed, well nourished, alert, in no distress and non-toxic and no overt pain behavior.  Eyes: anicteric  HEENT: grossly intact  Neck: supple, symmetric, trachea midline and no masses   Pulmonary:even and unlabored  Cardiovascular:No edema or pitting edema present  Skin:Normal without rashes " or lesions and well hydrated  Psychiatric:Mood and affect appropriate  Neurologic:Cranial Nerves II-XII grossly intact  Musculoskeletal:antalgic    Lumbar/Sacral Spine examination demonstrates.  Decreased range of motion lumbar spine with pain upon: flexion, lateral rotation to the left/right, and bending to the left/right.  Bilateral lumbar paraspinals tender to palpation. Muscle spasms noted in the lumbar area bilaterally. 4/5 left lower extremity strength in all muscle groups. Positive seated straight leg raise for bilateral lower extremities.  Sensitivity to light touch intact bilateral lower extremities. 2+ reflexes in the patella and Achilles.  No ankle clonus    Imaging  No orders to display       No orders of the defined types were placed in this encounter.       tudy Result    Narrative & Impression   XR SPINE LUMBAR 2 OR 3 VIEWS INJURY     INDICATION: M54.50: Low back pain, unspecified.     COMPARISON: None     FINDINGS:     No acute fracture. Intact pedicles.     Five non-rib-bearing lumbar vertebral bodies.     There is rotatory levoscoliosis of the lumbar spine. There is no subluxation.     There is partial narrowing of the L3-L4 disc space and small anterior endplate osteophytes are noted at multiple levels     Unremarkable soft tissues.     IMPRESSION:     1. Rotatory levoscoliosis of the lumbar spine.  2. Discogenic degenerative change particularly at L3-L4  3. No acute osseous abnormality           Computerized Assisted Algorithm (CAA) may have been used to analyze all applicable images.        Workstation performed: [id number]

## 2025-03-17 NOTE — PATIENT INSTRUCTIONS
Patient Education     Core Strengthening Exercises on Back or on Hands and Knees   About this topic   Your core muscles are in your chest, back, buttock, and stomach area. They are your abdominal, back, and pelvis muscles. These muscles help keep your body stable when using your arms or legs. They also help with balance and posture. There are many exercises you can do to keep these muscles strong.  If you have back problems like a compression fracture or a ruptured disc, doing some of these exercises could make your problem worse. Some of these exercises may cause lower back pain.  General   Before starting with a program, ask your doctor if you are healthy enough to do these exercises. Your doctor may have you work with a , chiropractor, or physical therapist to make a safe exercise program to meet your needs.  Strengthening Exercises   Strengthening exercises keep your muscles firm and strong. Start by repeating each exercise 2 to 3 times. Work up to doing each exercise 10 times. Try to do the exercises 2 to 3 times each day. Hold each exercise for 3 to 5 seconds. Do all exercises slowly.  Hip lifts ? Lie on your back with your knees bent and feet flat on the floor. Tighten your stomach muscles and push your heels into the floor to lift your buttocks off the floor. Relax.  Pelvic tilts ? Lie on your back with your knees bent and feet flat on the floor. Tighten your stomach muscles and press your lower back down to the floor. Relax.  Straight leg raises lying down ? Lie on your back with one leg straight. Bend your other knee so the foot is flat on the bed. Keeping your leg straight, lift the leg up to the level of your other knee. Lower it back down. Repeat with the other leg.  Knee flex lying down ? Lie on your back with both knees bent and your feet flat on the floor. Tighten your belly muscles. Raise one leg up and back down as if you are marching in slow motion. Keep belly muscles tight while you move  your leg. Switch legs. To make this exercise harder, raise both arms straight up in the air. Tighten your belly muscles. When you raise one leg up, reach the opposite arm over your head. Switch, moving the opposite arm and leg until you have done 10 repetitions on each side.  Abdominal crunches ? Lie on your back with both knees bent. Keep your feet flat on the floor. Place your hands in one of these positions. Try starting with the first position since it is the easiest. As you get better, use the other positions to make it harder.  Crunches with arms at sides.  Crunches with arms across chest.  Crunches with arms behind head. Be careful not to interlock your fingers behind your neck or head while doing crunches. This may add tension to your neck and cause strain.  Look at the ceiling. Tighten your belly muscles and lift your shoulders and upper back off the floor. Breathe out while you are doing this. Lower your shoulders to the floor. Breathe in while you are doing this. Relax your belly muscles all the way before starting another crunch.  Arm and leg lifts on hands and knees ? Start on your hands and knees. With all of these exercises, keep your back as level as possible. If you are having trouble with this, you may want to put a small object on your back such as a book. If it falls off, you are not keeping your back level enough during the exercise.  Lift one arm up to shoulder level and hold. Lower it back down. Now, lift up the other arm and hold.  Lift one leg up and kick it straight out until it is in line with your back and hold. Lower it back down. Now, lift up the other leg and hold.  Lift one arm and the OPPOSITE leg up at the same time and hold. Lower them down. Now, repeat using the other arm and leg. This is a very hard exercise. It may take time to be able to do this.               What will the results be?   Stronger core  Better balance  More toned belly and back muscles  Easier to do daily  activities  Better sports performance  Less low back pain  Helpful tips   Stay active and work out to keep your muscles strong and flexible.  Keep a healthy weight to avoid putting too much stress on your spine. Eat a healthy diet to keep your muscles healthy.  Be sure you do not hold your breath when exercising. This can raise your blood pressure. If you tend to hold your breath, try counting out loud when exercising. If any exercise bothers you, stop right away.  Try walking or cycling at an easy pace for a few minutes to warm up your muscles. Do this again after exercising.  Exercise may be slightly uncomfortable, but you should not have sharp pains. If you do get sharp pains, stop what you are doing. If the sharp pains continue, call your doctor.  Last Reviewed Date   2021-03-18  Consumer Information Use and Disclaimer   This generalized information is a limited summary of diagnosis, treatment, and/or medication information. It is not meant to be comprehensive and should be used as a tool to help the user understand and/or assess potential diagnostic and treatment options. It does NOT include all information about conditions, treatments, medications, side effects, or risks that may apply to a specific patient. It is not intended to be medical advice or a substitute for the medical advice, diagnosis, or treatment of a health care provider based on the health care provider's examination and assessment of a patient’s specific and unique circumstances. Patients must speak with a health care provider for complete information about their health, medical questions, and treatment options, including any risks or benefits regarding use of medications. This information does not endorse any treatments or medications as safe, effective, or approved for treating a specific patient. [employer] and its affiliates disclaim any warranty or liability relating to this information or the use thereof. The use of this information  is governed by the Terms of Use, available at https://www.[employer].com/en/know/clinical-effectiveness-terms   Copyright   Copyright © 2024 [employer] and its affiliates and/or licensors. All rights reserved.

## 2025-03-19 ENCOUNTER — OFFICE VISIT (OUTPATIENT)
Dept: PHYSICAL THERAPY | Facility: CLINIC | Age: 50
End: 2025-03-19
Payer: COMMERCIAL

## 2025-03-19 DIAGNOSIS — G57.02 PIRIFORMIS SYNDROME OF LEFT SIDE: Primary | ICD-10-CM

## 2025-03-19 DIAGNOSIS — M70.62 GREATER TROCHANTERIC BURSITIS OF LEFT HIP: ICD-10-CM

## 2025-03-19 PROCEDURE — 97140 MANUAL THERAPY 1/> REGIONS: CPT

## 2025-03-19 PROCEDURE — 97110 THERAPEUTIC EXERCISES: CPT

## 2025-03-19 NOTE — PROGRESS NOTES
"Daily Note     Today's date: 3/19/2025  Patient name: Heather Reaves  : 1975  MRN: [id number]  Referring provider: Carlos Ruano DO  Dx: No diagnosis found.               Subjective: Pt had follow up with MD on Monday morning.  MRI ordered.       Objective: See treatment diary below      Assessment: Tolerated treatment well. Added graston and cupping this session.  Will continue to progress as tolerated.      Plan: Continue per plan of care.      Precautions: none      Date 3/12 3/19 2/13 2/20 3/3   FOTO        Manuals        IASTM  LLQ TS      STM L glute and piriformis in R sidelying        L hip LAD        Pelvic clearing MET (PRN)        Neuro Re-Ed        Leg Press      SL, 40# 15 ea    CC Walkouts      P5 5x    Hip airplanes   3/10      Ther Ex        education TS, review of sx, xray, upcoming MD visit   Review of xray, MD follow up    Obj. Updates  TS       Cupping  4 cups placed on paraspinals with HHRs, cat/camel      Bridge c add        Supine glute stretch  3/15\"  3/15:\" 3/15\"     Nu Step   8m L5   8m L5    Hip abd/ext/flex on MH   35# 3/10   30# 15 ea    HHR   HHR 10x  HHR 10x  15x    Side glide at wall  10 ea   To correct R lumbar scoliosis 10 ea   Seated flexion  10/10\"               Ther Activity                        Gait Training                        Modalities        HP                       "

## 2025-03-24 ENCOUNTER — OFFICE VISIT (OUTPATIENT)
Dept: FAMILY MEDICINE CLINIC | Facility: CLINIC | Age: 50
End: 2025-03-24
Payer: COMMERCIAL

## 2025-03-24 VITALS
WEIGHT: 188.2 LBS | HEART RATE: 100 BPM | SYSTOLIC BLOOD PRESSURE: 116 MMHG | DIASTOLIC BLOOD PRESSURE: 78 MMHG | BODY MASS INDEX: 26.94 KG/M2 | OXYGEN SATURATION: 99 % | TEMPERATURE: 97.4 F | HEIGHT: 70 IN

## 2025-03-24 DIAGNOSIS — E83.40 MAGNESIUM DISORDER: ICD-10-CM

## 2025-03-24 DIAGNOSIS — R73.9 HYPERGLYCEMIA: ICD-10-CM

## 2025-03-24 DIAGNOSIS — M51.16 LUMBAR DISC DISEASE WITH RADICULOPATHY: ICD-10-CM

## 2025-03-24 DIAGNOSIS — M51.369 DEGENERATION OF INTERVERTEBRAL DISC OF LUMBAR SPINE WITHOUT DISC HERNIATION: ICD-10-CM

## 2025-03-24 DIAGNOSIS — R53.83 FATIGUE, UNSPECIFIED TYPE: ICD-10-CM

## 2025-03-24 DIAGNOSIS — Z12.31 ENCOUNTER FOR SCREENING MAMMOGRAM FOR BREAST CANCER: ICD-10-CM

## 2025-03-24 DIAGNOSIS — J01.00 ACUTE MAXILLARY SINUSITIS, RECURRENCE NOT SPECIFIED: ICD-10-CM

## 2025-03-24 DIAGNOSIS — Z86.0100 HISTORY OF COLON POLYPS: ICD-10-CM

## 2025-03-24 DIAGNOSIS — L21.9 SEBORRHEIC DERMATITIS: ICD-10-CM

## 2025-03-24 DIAGNOSIS — Z13.6 ENCOUNTER FOR LIPID SCREENING FOR CARDIOVASCULAR DISEASE: ICD-10-CM

## 2025-03-24 DIAGNOSIS — J30.1 NON-SEASONAL ALLERGIC RHINITIS DUE TO POLLEN: Primary | ICD-10-CM

## 2025-03-24 DIAGNOSIS — G57.02 PIRIFORMIS SYNDROME OF LEFT SIDE: ICD-10-CM

## 2025-03-24 DIAGNOSIS — Z13.220 ENCOUNTER FOR LIPID SCREENING FOR CARDIOVASCULAR DISEASE: ICD-10-CM

## 2025-03-24 DIAGNOSIS — Z91.030 ALLERGY TO HONEY BEE VENOM: ICD-10-CM

## 2025-03-24 DIAGNOSIS — E66.3 OVERWEIGHT: ICD-10-CM

## 2025-03-24 DIAGNOSIS — K21.9 GASTROESOPHAGEAL REFLUX DISEASE, UNSPECIFIED WHETHER ESOPHAGITIS PRESENT: ICD-10-CM

## 2025-03-24 DIAGNOSIS — M54.50 LEFT LOW BACK PAIN, UNSPECIFIED CHRONICITY, UNSPECIFIED WHETHER SCIATICA PRESENT: ICD-10-CM

## 2025-03-24 PROCEDURE — 99214 OFFICE O/P EST MOD 30 MIN: CPT | Performed by: FAMILY MEDICINE

## 2025-03-24 RX ORDER — AMOXICILLIN 875 MG/1
875 TABLET, COATED ORAL 2 TIMES DAILY
Qty: 20 TABLET | Refills: 0 | Status: SHIPPED | OUTPATIENT
Start: 2025-03-24 | End: 2025-04-03

## 2025-03-24 RX ORDER — KETOCONAZOLE 20 MG/ML
1 SHAMPOO, SUSPENSION TOPICAL 2 TIMES WEEKLY
Qty: 120 ML | Refills: 5 | Status: SHIPPED | OUTPATIENT
Start: 2025-03-24 | End: 2025-07-04

## 2025-03-24 NOTE — ASSESSMENT & PLAN NOTE
Again, MRI of the lumbar spine is scheduled.  Continue current management.  Pain management is following.

## 2025-03-24 NOTE — ASSESSMENT & PLAN NOTE
Patient has had some relief with physical therapy but symptoms do persist at times.  Patient is doing home stretches and exercises.

## 2025-03-24 NOTE — ASSESSMENT & PLAN NOTE
Last colonoscopy was approximately July 2021.  Patient is due in July 2026.  Currently on a 5-year plan.

## 2025-03-24 NOTE — PROGRESS NOTES
Name: Heather Reaves      : 1975      MRN: [id number]  Encounter Provider: Carlos Ruano DO  Encounter Date: 3/24/2025   Encounter department: [facility]  :  Assessment & Plan  Non-seasonal allergic rhinitis due to pollen  Patient is currently controlled on current regimen.       Gastroesophageal reflux disease, unspecified whether esophagitis present  Currently controlled on current regimen.       Seborrheic dermatitis    Orders:    ketoconazole (NIZORAL) 2 % shampoo; Apply 1 Application topically 2 (two) times a week for 30 doses    Allergy to honey bee venom  No recent exposure.  Has EpiPen.  History of colon polyps  Last colonoscopy was approximately 2021.  Patient is due in 2026.  Currently on a 5-year plan.       Overweight    Bariatric medicine is following.       Lumbar disc disease with radiculopathy  Pain management is following.  MRI of the lumbar spine       Piriformis syndrome of left side  Patient has had some relief with physical therapy but symptoms do persist at times.  Patient is doing home stretches and exercises.       Left low back pain, unspecified chronicity, unspecified whether sciatica present  Patient using ibuprofen sparingly.       Degeneration of intervertebral disc of lumbar spine without disc herniation  Again, MRI of the lumbar spine is scheduled.  Continue current management.  Pain management is following.       Encounter for screening mammogram for breast cancer    Orders:    Mammo screening bilateral w 3d and cad; Future    Hyperglycemia  Last hemoglobin A1c was 5.8.  Her highest hemoglobin A1c was 6.1 in the past.  Orders:    Hemoglobin A1C; Future    Encounter for lipid screening for cardiovascular disease    Orders:    Lipid Panel with Direct LDL reflex; Future    Magnesium disorder    Orders:    Magnesium; Future    Acute maxillary sinusitis, recurrence not specified    Orders:    amoxicillin (AMOXIL) 875 mg tablet; Take 1 tablet (875 mg  "total) by mouth 2 (two) times a day for 10 days    Fatigue, unspecified type  May be multifactorial.  Update fasting lab work.  Orders:    CBC and differential; Future    Comprehensive metabolic panel; Future    TSH, 3rd generation with Free T4 reflex; Future           History of Present Illness   Patient is a very pleasant 49-year-old female who presents today for follow-up on complex medical conditions and chronic disease management.  She does work for [employer] at the [employer].    The patient has some complaints for over a week she has had sinus pressure, sinus pain, purulent rhinorrhea, congestion and postnasal drip.  She has a tendency for sinus infections.    The patient has chronic low back pain with left piriformis syndrome.  She has seen physical therapy with some relief.  She has seen pain management Dr. Yuan and an MRI is scheduled.  Patient will follow-up with Dr. Yuan.    Patient is also followed by bariatric medicine and is being monitored on Phenterimine.  Patient relates no side effects.  No tremors, hypertension, palpitations.      Review of Systems   Constitutional:  Negative for chills and fever.   HENT:  Positive for congestion, postnasal drip, rhinorrhea, sinus pressure, sinus pain and sore throat. Negative for ear pain.    Eyes:  Negative for pain and visual disturbance.   Respiratory:  Negative for cough and shortness of breath.    Cardiovascular:  Negative for chest pain and palpitations.   Gastrointestinal:  Negative for abdominal pain and vomiting.   Genitourinary:  Negative for dysuria and hematuria.   Musculoskeletal:  Positive for back pain. Negative for arthralgias.   Skin:  Negative for color change and rash.   Neurological:  Negative for seizures and syncope.   Psychiatric/Behavioral: Negative.     All other systems reviewed and are negative.      Objective   /78   Pulse 100   Temp (!) 97.4 °F (36.3 °C)   Ht 5' 10\" (1.778 m)   Wt 85.4 kg (188 lb 3.2 oz)   " SpO2 99%   BMI 27.00 kg/m²      Physical Exam  Vitals and nursing note reviewed.   Constitutional:       General: She is not in acute distress.     Appearance: She is well-developed. She is not toxic-appearing or diaphoretic.   HENT:      Head: Normocephalic and atraumatic.      Right Ear: Tympanic membrane normal.      Left Ear: Tympanic membrane normal.      Nose: Congestion and rhinorrhea (Purulent) present.      Right Turbinates: Swollen.      Left Turbinates: Swollen.      Right Sinus: Maxillary sinus tenderness and frontal sinus tenderness present.      Left Sinus: Maxillary sinus tenderness and frontal sinus tenderness present.      Mouth/Throat:      Mouth: Mucous membranes are moist.   Eyes:      Conjunctiva/sclera: Conjunctivae normal.      Pupils: Pupils are equal, round, and reactive to light.   Cardiovascular:      Rate and Rhythm: Normal rate and regular rhythm.      Heart sounds: No murmur heard.  Pulmonary:      Effort: Pulmonary effort is normal. No respiratory distress.      Breath sounds: Normal breath sounds.   Abdominal:      General: Bowel sounds are normal.      Palpations: Abdomen is soft.      Tenderness: There is no abdominal tenderness. There is no guarding or rebound.   Musculoskeletal:         General: No swelling.      Cervical back: Neck supple.   Skin:     General: Skin is warm and dry.      Capillary Refill: Capillary refill takes less than 2 seconds.   Neurological:      General: No focal deficit present.      Mental Status: She is alert and oriented to person, place, and time.   Psychiatric:         Mood and Affect: Mood normal.         Behavior: Behavior normal.         Thought Content: Thought content normal.

## 2025-03-24 NOTE — ASSESSMENT & PLAN NOTE
Last hemoglobin A1c was 5.8.  Her highest hemoglobin A1c was 6.1 in the past.  Orders:    Hemoglobin A1C; Future

## 2025-03-24 NOTE — ASSESSMENT & PLAN NOTE
Orders:    ketoconazole (NIZORAL) 2 % shampoo; Apply 1 Application topically 2 (two) times a week for 30 doses

## 2025-03-25 DIAGNOSIS — J30.1 NON-SEASONAL ALLERGIC RHINITIS DUE TO POLLEN: ICD-10-CM

## 2025-03-25 RX ORDER — FLUTICASONE PROPIONATE 50 MCG
2 SPRAY, SUSPENSION (ML) NASAL DAILY
Qty: 16 G | Refills: 3 | Status: SHIPPED | OUTPATIENT
Start: 2025-03-25

## 2025-03-26 ENCOUNTER — HOSPITAL ENCOUNTER (OUTPATIENT)
Dept: MRI IMAGING | Facility: HOSPITAL | Age: 50
Discharge: HOME | End: 2025-03-26
Attending: ANESTHESIOLOGY
Payer: COMMERCIAL

## 2025-03-26 DIAGNOSIS — M51.16 LUMBAR DISC DISEASE WITH RADICULOPATHY: ICD-10-CM

## 2025-03-26 DIAGNOSIS — G89.4 CHRONIC PAIN SYNDROME: ICD-10-CM

## 2025-03-26 DIAGNOSIS — M51.362 DEGENERATION OF INTERVERTEBRAL DISC OF LUMBAR REGION WITH DISCOGENIC BACK PAIN AND LOWER EXTREMITY PAIN: ICD-10-CM

## 2025-03-26 PROCEDURE — 72148 MRI LUMBAR SPINE W/O DYE: CPT

## 2025-03-27 ENCOUNTER — OFFICE VISIT (OUTPATIENT)
Dept: PHYSICAL THERAPY | Facility: CLINIC | Age: 50
End: 2025-03-27
Payer: COMMERCIAL

## 2025-03-27 DIAGNOSIS — G57.02 PIRIFORMIS SYNDROME OF LEFT SIDE: Primary | ICD-10-CM

## 2025-03-27 DIAGNOSIS — M70.62 GREATER TROCHANTERIC BURSITIS OF LEFT HIP: ICD-10-CM

## 2025-03-27 PROCEDURE — 97110 THERAPEUTIC EXERCISES: CPT

## 2025-03-27 PROCEDURE — 97140 MANUAL THERAPY 1/> REGIONS: CPT

## 2025-03-27 NOTE — PROGRESS NOTES
"Daily Note     Today's date: 3/27/2025  Patient name: Heather Reaves  : 1975  MRN: [id number]  Referring provider: Carlos Ruano DO  Dx:   Encounter Diagnosis     ICD-10-CM    1. Piriformis syndrome of left side  G57.02       2. Greater trochanteric bursitis of left hip  M70.62                      Subjective: Pt  notes stability with her current program. She did note relief after tx last visit.      Objective: See treatment diary below      Assessment: Tolerated treatment well. Patient exhibited good technique with therapeutic exercises. We repeated cupping tx with motion including addition of thread the needle. Manual STM by PTA F/B R sidelying gapping technique.       Plan: Continue per plan of care.      Precautions: none      Date 3/12 3/19 3/27 2/20 3/3   FOTO        Manuals        IASTM  LLQ TS Stm ds     STM L glute and piriformis in R sidelying        L hip LAD        Pelvic clearing MET (PRN)        Neuro Re-Ed        Leg Press      SL, 40# 2/15 ea    CC Walkouts      P5 5x    Hip airplanes        Ther Ex        education TS, review of sx, xray, upcoming MD visit       Obj. Updates  TS       Cupping  4 cups placed on paraspinals with HHRs, cat/camel 4 cups placed on paraspinals c HHR,cat/camel, 10x ea     Thread the needle c cups   5x ea 5x ea    Supine glute stretch  3/15\"  3/15:\" 3/15\"     Nu Step     8m L5    Hip abd/ext/flex on MH     30# 15 ea    HHR     15x    Side glide at wall  10 ea    10 ea   Seated flexion  10/10\"       SL gapping   5x 10\"     Ther Activity                        Gait Training                        Modalities        HP                         "

## 2025-04-01 DIAGNOSIS — E66.3 OVERWEIGHT: ICD-10-CM

## 2025-04-01 RX ORDER — PHENTERMINE HYDROCHLORIDE 37.5 MG/1
37.5 TABLET ORAL EVERY MORNING
Qty: 30 TABLET | Refills: 1 | Status: SHIPPED | OUTPATIENT
Start: 2025-04-01

## 2025-04-01 NOTE — TELEPHONE ENCOUNTER
03/02/2025 01/20/2025 Phentermine Hcl (Tablet) 30.0 30 37.5 MG NA [facility], [facility]  Private Pay 1 / 1 PA      1 [id number] 01/26/2025 01/20/2025 Phentermine Hcl (Tablet) 30.0 30 37.5 MG NA EDISON, [facility]  Commercial Insurance 0 / 1 PA    1 [id number] 01/04/2025 11/27/2024 Phentermine Hcl (Tablet) 15.0 30 37.5 MG NA [facility]ISON, [facility]

## 2025-04-03 ENCOUNTER — APPOINTMENT (OUTPATIENT)
Dept: PHYSICAL THERAPY | Facility: CLINIC | Age: 50
End: 2025-04-03
Payer: COMMERCIAL

## 2025-04-04 ENCOUNTER — TELEPHONE (OUTPATIENT)
Age: 50
End: 2025-04-04

## 2025-04-04 NOTE — TELEPHONE ENCOUNTER
Please see scanned prior auth for Phentermine 37.5 mg received from [employer]. Thank you.     Prior auth was requested back on 3/3/25 with no action taken.

## 2025-04-09 ENCOUNTER — OFFICE VISIT (OUTPATIENT)
Dept: PAIN MEDICINE | Facility: CLINIC | Age: 50
End: 2025-04-09
Payer: COMMERCIAL

## 2025-04-09 VITALS
HEIGHT: 70 IN | OXYGEN SATURATION: 99 % | WEIGHT: 186.4 LBS | BODY MASS INDEX: 26.69 KG/M2 | HEART RATE: 88 BPM | TEMPERATURE: 97.4 F | RESPIRATION RATE: 16 BRPM

## 2025-04-09 DIAGNOSIS — M47.816 LUMBAR SPONDYLOSIS: ICD-10-CM

## 2025-04-09 DIAGNOSIS — M51.16 LUMBAR DISC DISEASE WITH RADICULOPATHY: Primary | ICD-10-CM

## 2025-04-09 DIAGNOSIS — G89.4 CHRONIC PAIN SYNDROME: ICD-10-CM

## 2025-04-09 PROCEDURE — 99214 OFFICE O/P EST MOD 30 MIN: CPT | Performed by: ANESTHESIOLOGY

## 2025-04-09 NOTE — PROGRESS NOTES
Assessment:  1. Lumbar disc disease with radiculopathy    2. Lumbar spondylosis    3. Chronic pain syndrome        Plan:  Patient is a 49-year-old female complains of left-sided back pain with chronic pain syndrome secondary to piriformis syndrome presents to office for follow-up visit.  MRI lumbar spine is reviewed the patient does correlate patient's right radicular symptoms which is at that left side L4-L5 pain pattern.  Pain mainly is in the buttocks however does occasionally radiate down into the left lower extremity.  1.  Will schedule patient for a left L4-L5 and L5-S1 transforaminal epidural steroid injection  2.  Patient continue home exercise program  3.  We will follow-up 1 month after injection  4.  We will add lumbar traction to patient physical therapy        Complete risks and benefits including bleeding, infection, tissue reaction, nerve injury and allergic reaction were discussed. The approach was demonstrated using models and literature was provided. Verbal and written consent was obtained.        History of Present Illness:  The patient is a 49 y.o. female who presents for a follow up office visit in regards to Back Pain.   The patient’s current symptoms include 4 out of 10 intermittent dosaging, numbness, throbbing pain without particular time pattern.    Current pain medications includes: None.     I have personally reviewed and/or updated the patient's past medical history, past surgical history, family history, social history, current medications, allergies, and vital signs today.         Review of Systems  Review of Systems   Musculoskeletal:  Positive for back pain.        Decreased ROM   All other systems reviewed and are negative.          Past Medical History:   Diagnosis Date    Abnormal Pap smear of cervix     Allergic rhinitis     Resolved 7/14/2017     Anxiety     Generalized anxiety disorder     Resolved 7/14/2017        Past Surgical History:   Procedure Laterality Date     COLPOSCOPY      NO PAST SURGERIES         Family History   Problem Relation Age of Onset    Cancer Mother     Colon cancer Mother     Colon polyps Mother     Uterine cancer Mother     Heart disease Father     No Known Problems Sister     No Known Problems Daughter     No Known Problems Daughter     Colon cancer Maternal Grandmother     No Known Problems Paternal Grandmother     Colon cancer Cousin     No Known Problems Maternal Aunt     No Known Problems Maternal Aunt     No Known Problems Maternal Aunt        Social History     Occupational History    Not on file   Tobacco Use    Smoking status: Former     Types: Cigarettes    Smokeless tobacco: Never   Vaping Use    Vaping status: Never Used   Substance and Sexual Activity    Alcohol use: Not Currently     Comment: occasional     Drug use: Never    Sexual activity: Yes     Partners: Male     Birth control/protection: Rhythm         Current Outpatient Medications:     albuterol (PROVENTIL HFA,VENTOLIN HFA) 90 mcg/act inhaler, Inhale 2 puffs every 6 (six) hours as needed for wheezing, Disp: 18 g, Rfl: 0    cyclobenzaprine (FLEXERIL) 10 mg tablet, Take 1 tablet (10 mg total) by mouth daily at bedtime as needed for muscle spasms, Disp: 30 tablet, Rfl: 0    EPINEPHrine (EPIPEN) 0.3 mg/0.3 mL SOAJ, Inject 0.3 mL (0.3 mg total) into a muscle once for 1 dose PRN bee sting, Disp: 0.6 mL, Rfl: 0    fluticasone (FLONASE) 50 mcg/act nasal spray, 2 sprays into each nostril daily, Disp: 16 g, Rfl: 3    ibuprofen (MOTRIN) 600 mg tablet, Take 1 tablet (600 mg total) by mouth every 8 (eight) hours as needed for moderate pain, Disp: 90 tablet, Rfl: 1    ketoconazole (NIZORAL) 2 % shampoo, Apply 1 Application topically 2 (two) times a week for 30 doses, Disp: 120 mL, Rfl: 5    Magnesium Oxide 400 MG CAPS, Take 1 tablet 1 to 2 times daily with onset of menstrual symptoms, Disp: , Rfl: 0    phentermine (ADIPEX-P) 37.5 MG tablet, Take 1 tablet (37.5 mg total) by mouth every morning  "Before breakfast or 1-2 hours after, Disp: 30 tablet, Rfl: 1    scopolamine (TRANSDERM-SCOP) 1 mg/3 days TD 72 hr patch, PLACE 1 PATCH ON THE SKIN OVER 72 HOURS EVERY THIRD DAY (Patient not taking: Reported on 3/24/2025), Disp: , Rfl:     Allergies   Allergen Reactions    Bee Venom Anaphylaxis    Food Facial Swelling     Lobster       Physical Exam:    Pulse 88   Temp (!) 97.4 °F (36.3 °C)   Resp 16   Ht 5' 10\" (1.778 m)   Wt 84.6 kg (186 lb 6.4 oz)   SpO2 99%   BMI 26.75 kg/m²     Constitutional:normal, well developed, well nourished, alert, in no distress and non-toxic and no overt pain behavior.  Eyes:anicteric  HEENT:grossly intact  Neck:supple, symmetric, trachea midline and no masses   Pulmonary:even and unlabored  Cardiovascular:No edema or pitting edema present  Skin:Normal without rashes or lesions and well hydrated  Psychiatric:Mood and affect appropriate  Neurologic:Cranial Nerves II-XII grossly intact  Musculoskeletal:normal    Lumbar/Sacral Spine examination demonstrates.  Decreased range of motion lumbar spine with pain upon: flexion, lateral rotation to the left/right, and bending to the left/right.  Bilateral lumbar paraspinals tender to palpation. Muscle spasms noted in the lumbar area bilaterally. 4/5 lower extremity strength in all muscle groups bilaterally. Positive seated straight leg raise for bilateral lower extremities.  Sensitivity to light touch intact bilateral lower extremities. 2+ reflexes in the patella and Achilles.  No ankle clonus      Imaging      Study Result    Narrative & Impression   MRI LUMBAR SPINE WITHOUT CONTRAST     INDICATION: M51.362: Other intervertebral disc degeneration, lumbar region with discogenic back pain and lower extremity pain  M51.16: Intervertebral disc disorders with radiculopathy, lumbar region  G89.4: Chronic pain syndrome.     COMPARISON: Lumbar radiographs 12/4/2024     TECHNIQUE:  Multiplanar, multisequence imaging of the lumbar spine was " performed. .        IMAGE QUALITY:  Diagnostic     FINDINGS:     VERTEBRAL BODIES:  There are 5 lumbar type vertebral bodies. Mild levoscoliosis, apex at L3. No compression fracture. Superior L3 endplate Schmorl's node is noted with adjacent Modic type I endplate changes.     SACRUM:  Normal signal within the sacrum. No evidence of insufficiency or stress fracture. Mild marrow edema at the superior left sacral ala, likely degenerative.     DISTAL CORD AND CONUS:  Normal size and signal within the distal cord and conus. The conus terminates at the L1-2 level.  The cauda equina nerve roots appear within normal limits.     PARASPINAL SOFT TISSUES:  Paraspinal soft tissues are unremarkable.     LOWER THORACIC DISC SPACES:  Normal disc height and signal.  No disc herniation, canal stenosis or foraminal narrowing.     LUMBAR DISC SPACES:     L1-2: No focal disc herniation, central canal stenosis, or neural foraminal narrowing.     L2-3: Mild disc bulge without central canal or neural foraminal narrowing.     L3-4: Mild to moderate diffuse disc bulge extending into the foraminal regions. Bilateral ligamentum flavum and facet hypertrophy. No central canal stenosis. Mild to moderate bilateral subarticular/lateral recess narrowing. Mild to moderate bilateral   neural foraminal stenosis.     L4-5: Mild to moderate diffuse disc bulge with a left posterior annular fissure. Bilateral ligamentum flavum and facet hypertrophy.  No central canal stenosis. Mild bilateral subarticular/lateral recess narrowing. Mild to moderate right and moderate to   severe left neural foraminal stenosis with encroachment of the exiting left L4 nerve root.     L5-S1: Tiny central disc protrusion with bilateral facet hypertrophy.  No central canal or  subarticular/lateral recess narrowing. Moderate left neural foraminal stenosis with encroachment of the exiting left L5 nerve root.     IMPRESSION:     Mild levoscoliosis, apex at L3.     Multilevel lumbar  degenerative disc disease as detailed without significant central canal narrowing. Moderate to severe left L4-5 and L5-S1 neural foraminal narrowing with encroachment of the exiting left L4 and L5 nerve roots. Correlate with   radiculopathy symptoms.        Workstation performed: [id number]           No orders to display       No orders of the defined types were placed in this encounter.

## 2025-04-10 ENCOUNTER — EVALUATION (OUTPATIENT)
Dept: PHYSICAL THERAPY | Facility: CLINIC | Age: 50
End: 2025-04-10
Payer: COMMERCIAL

## 2025-04-10 DIAGNOSIS — M47.816 LUMBAR SPONDYLOSIS: ICD-10-CM

## 2025-04-10 DIAGNOSIS — G89.4 CHRONIC PAIN SYNDROME: ICD-10-CM

## 2025-04-10 DIAGNOSIS — M51.16 LUMBAR DISC DISEASE WITH RADICULOPATHY: Primary | ICD-10-CM

## 2025-04-10 PROCEDURE — 97110 THERAPEUTIC EXERCISES: CPT

## 2025-04-10 PROCEDURE — 97112 NEUROMUSCULAR REEDUCATION: CPT

## 2025-04-10 PROCEDURE — 97140 MANUAL THERAPY 1/> REGIONS: CPT

## 2025-04-10 NOTE — LETTER
April 10, 2025    Derrick Yuan MD  [address] [facility] [place] [address]    Patient: Heather Reaves   YOB: 1975   Date of Visit: 4/10/2025     Encounter Diagnosis     ICD-10-CM    1. Lumbar disc disease with radiculopathy  M51.16       2. Lumbar spondylosis  M47.816       3. Chronic pain syndrome  G89.4           Dear Dr. Derrick Yuan MD:    Thank you for your recent referral of Heather Reaves. Please review the attached evaluation summary from Heather's recent visit.     Please verify that you agree with the plan of care by signing the attached order.     If you have any questions or concerns, please do not hesitate to call.     I sincerely appreciate the opportunity to share in the care of one of your patients and hope to have another opportunity to work with you in the near future.       Sincerely,    Jin Warner, PT      Referring Provider:      I certify that I have read the below Plan of Care and certify the need for these services furnished under this plan of treatment while under my care.                    Derrick Yuan MD  [address] [facility] [place] [address]  Via In Basket          PT Re-Evaluation     Today's date: 4/10/2025  Patient name: Heather Reaves  : 1975  MRN: [id number]  Referring provider: Derrick Yuan MD  Dx:   Encounter Diagnosis     ICD-10-CM    1. Lumbar disc disease with radiculopathy  M51.16       2. Lumbar spondylosis  M47.816       3. Chronic pain syndrome  G89.4               Start Time: 171  Stop Time: 180  Total time in clinic (min): 54 minutes    Assessment  Impairments: abnormal or restricted ROM, activity intolerance, impaired physical strength, pain with function, participation limitations and activity limitations  Other impairment: pelvic obliquity (reduced with MET at IE)  Symptom irritability: high    Assessment details: Heather is a 49 year old female with complaint of L lower flank and hip pain.  She  presented today with a pelvic obliquity that was reduced with MET pelvic clearing.  She has L concavity levoscliosis and increased tone into L gluteals and deep hip musculature.  Pt with fair glute activation and pain with bridge.  She will benefit from a comprehensive program to address these deficits and improve her hip stability and core strength.     2/5 UPDATE: Zoya has been seen for 12 visits for L SIJ pain.  She is continuing to make good progress both objectively and subjectively (FOTO score).  Core strength is progressing well and patient continues to be challenged with hip strength and stability exercises.  She continues to have pain in L SIJ and sacral area, however wih significantly less rating than at IE.  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  Pt has been able to resume treadmill activity and reports sleeping better with less reliance on pain medication.  However, she may be a candidate for injection or shockwave therapy at this time given location of pain.  Please advise.  We will continue to progress function per her tolerance moving forward.     3/12 UPDATE: Heather has been seen for 15 visits for L SIJ pain.  She has made a significant improvement in her functional capacity since IE.  She has restored full ROM in all planes in hips and in lumbar spine.  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  However, she continues to report that there is a dull aching pain localized at L SIJ, made worse with hip flexion MMT and end range lumbar extension.  She has already had xrays completed without significant findings.  Believe patient would benefit from an MRI at this time to further diagnose current symptoms.  Please advise on progression of care plan.     4/10 update: Heather has been  "seen for 16 visits for LLQ pain.  Recent imaging showing bulging discs and hypertrophic facet joints with foraminal stenosis in lower left segments.  Pt has continued to show improvement in her core and hip strength, as measured with dynamometer today.  We added manual traction, press ups and further core stabilization exercises today with good tolerance.  We will continue to progress core strength as patient awaits injection scheduled for May.  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 MET  Decrease low back pain by 25% for improved tolerance with adls and home duties by 3-4 weeks. MET  Improve Lumbar rom to wfl for improved tolerance with adls and home duties by 3-4 weeks. MET\"    LTGs:  \"Improve FOTO score from 45 to 68 indicating improved tolerance with activities involving the low back by discharge. MET  Patient will demonstrate rom and strength to the pelvis and hips wfl for improved tolerance with adls and home duties by discharge.  NEARLY MET  Patient will be free of radicular symptoms by discharge. NEARLY MET  Pt will be able to lay on R side at night without onset of symptoms NEARLY MET  Pt will be able to sit to tolerate job demands without exacerbation of symptoms NEARLY MET      Plan  Patient would benefit from: skilled physical therapy  Planned modality interventions: cryotherapy and thermotherapy: hydrocollator packs    Planned therapy interventions: abdominal trunk stabilization, ADL retraining, body mechanics training, flexibility, functional ROM exercises, home exercise program, therapeutic exercise, therapeutic activities, stretching, strengthening, postural training, patient education, joint mobilization and manual therapy    Frequency: 1-2x week  Duration in weeks: 4  Plan of Care beginning date: 4/10/2025  Plan of Care expiration date: " "5/8/2025  Treatment plan discussed with: patient  Plan details: Patient informed that from this point forward, to ensure adherence to the aforementioned plan of care, all or some of the treatment may be performed and carried out by a Physical Therapy Assistant (PTA) with supervision from a licensed Physical Therapist (PT) in accordance with [facility] Physical Therapy Practice Act.  Patient will continue to benefit from skilled physical therapy to address the functional deficits that were identified during the evaluation today. We will continue to progress the therapy program to address these functional deficits and achieve the established goals.                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  States a pulsating discomfort.  When she lays down at night, states pain is excruciating.  Has tried several sleeping positions with difficulty finding relief.      Saw MD - given a steroid pack and flexiril - states she is finally at a level where pain is not excruciating but still having diff with laying down.  Has began walking on treadmill.      Discomfort with sitting     2/5 Update: Pt states she is improving - states she is up to 25-30 minutes of walk/run on treadmill several days weekly.  However, states she still continues to feel the pain, ableit less intense.  One episode of pulling hay bails did exacerbate the pain more than anything else.  Feels her core is stronger and she is progressing her hip pain.  Better tolerance to job demands both at farm and at work    3/12 UPDATE: Pt states she is continuing to feel progress.  Last week she was able to help her  lift a turbo heater from the basement without straining SIJ.  Has been continuing with her workout progress.  States the pain/discomfort is \"just always there\".  She is able to take " care of duties around the farm with better tolerance but remains cautious with bailing/lifting hay.   Has follow up with Dr. Yuan this coming Monday.     4/10:   Quality of life: good    Patient Goals  Patient goals for therapy: decreased edema, return to sport/leisure activities and independence with ADLs/IADLs    Pain  Current pain ratin  Location: L SIJ  Quality: sharp  Exacerbated by: laying down at night.    Life stress: demands at farm      Diagnostic Tests  X-ray: abnormal  Treatments  Previous treatment: chiropractic, massage and medication (some relief with chiro manip)  Current treatment: medication        Objective     Static Posture     Comments  Left pelvic obliquity reduced with a MET pelvic clearing     3/12 No apparent pelvic obliquity upon assessment this visit     Active Range of Motion     Lumbar   Flexion: Active lumbar flexion: hands to floor, no knee flexion.  WFL  Extension: 20 (L sided discomfrot) degrees   Left Hip   Flexion: 132 degrees WFL  Extension: 10 degrees   External rotation (90/90): Left hip active external rotation 90/90: Full.   Internal rotation (90/90): Left hip active internal rotation 90/90: Full.     Right Hip   Flexion: 131 degrees WFL  Extension: 11 degrees   External rotation (90/90): Right hip active external rotation 90/90: Full.   Internal rotation (90/90): Right hip active internal rotation 90/90: Full.     Additional Active Range of Motion Details  HS 90/90  R: full rom   L: full rom    3/12 HS 90/90 remains full     Strength/Myotome Testing     Left Hip   Planes of Motion   Left hip flexors strength: 35#  Left hip extension strength: 33#  Left hip abductors strength: 38#    Right Hip   Planes of Motion   Right hip flexors strength: 40#  Right hip extension strength: 33#  Right hip abductors strength: 42#    Muscle Activation   Patient able to activate left multifidus and right multifidus.   Patient unable to activate left transverse abdominals and right  "transverse abdominals.              Precautions: none      Date 3/12 3/19 3/27 4/10 3/3   FOTO        Manuals        IASTM  LLQ TS Stm ds STM TS    STM L glute and piriformis in R sidelying        Lumbar traction     TS with belt in hooklying position 5 min    L hip LAD        Pelvic clearing MET (PRN)        Neuro Re-Ed        Leg Press      SL, 40# 2/15 ea    CC Walkouts      P5 5x    Hip airplanes        Back extensions    35# 3/15    Dead Bug    2/10    Prone press ups    2/10    KB around the world     15# 2/10 ea             Ther Ex        education TS, review of sx, xray, upcoming MD visit   TS    Obj. Updates  TS   TS    Cupping  4 cups placed on paraspinals with HHRs, cat/camel 4 cups placed on paraspinals c HHR,cat/camel, 10x ea     Thread the needle c cups   5x ea     Supine glute stretch  3/15\"  3/15:\"     Nu Step     8m L5    Hip abd/ext/flex on MH     30# 15 ea    HHR     15x    Side glide at wall  10 ea    10 ea   Seated flexion  10/10\"       SL gapping   5x 10\"     Ther Activity                        Gait Training                        Modalities        HP                                       "

## 2025-04-10 NOTE — PROGRESS NOTES
PT Re-Evaluation     Today's date: 4/10/2025  Patient name: Heather Reaves  : 1975  MRN: [id number]  Referring provider: Derrick Yuan MD  Dx:   Encounter Diagnosis     ICD-10-CM    1. Lumbar disc disease with radiculopathy  M51.16       2. Lumbar spondylosis  M47.816       3. Chronic pain syndrome  G89.4               Start Time: 1711  Stop Time: 1805  Total time in clinic (min): 54 minutes    Assessment  Impairments: abnormal or restricted ROM, activity intolerance, impaired physical strength, pain with function, participation limitations and activity limitations  Other impairment: pelvic obliquity (reduced with MET at IE)  Symptom irritability: high    Assessment details: Heather is a 49 year old female with complaint of L lower flank and hip pain.  She presented today with a pelvic obliquity that was reduced with MET pelvic clearing.  She has L concavity levoscliosis and increased tone into L gluteals and deep hip musculature.  Pt with fair glute activation and pain with bridge.  She will benefit from a comprehensive program to address these deficits and improve her hip stability and core strength.      UPDATE: Zoya has been seen for 12 visits for L SIJ pain.  She is continuing to make good progress both objectively and subjectively (FOTO score).  Core strength is progressing well and patient continues to be challenged with hip strength and stability exercises.  She continues to have pain in L SIJ and sacral area, however wih significantly less rating than at IE.  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  Pt has been able to resume treadmill activity and reports sleeping better with less reliance on pain medication.  However, she may be a candidate for injection or shockwave therapy at this time given location of pain.  Please advise.  We will continue to progress  "function per her tolerance moving forward.     3/12 UPDATE: Heather has been seen for 15 visits for L SIJ pain.  She has made a significant improvement in her functional capacity since IE.  She has restored full ROM in all planes in hips and in lumbar spine.  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  However, she continues to report that there is a dull aching pain localized at L SIJ, made worse with hip flexion MMT and end range lumbar extension.  She has already had xrays completed without significant findings.  Believe patient would benefit from an MRI at this time to further diagnose current symptoms.  Please advise on progression of care plan.     4/10 update: Heather has been seen for 16 visits for LLQ pain.  Recent imaging showing bulging discs and hypertrophic facet joints with foraminal stenosis in lower left segments.  Pt has continued to show improvement in her core and hip strength, as measured with dynamometer today.  We added manual traction, press ups and further core stabilization exercises today with good tolerance.  We will continue to progress core strength as patient awaits injection scheduled for May.  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 MET  Decrease low back pain by 25% for improved tolerance with adls and home duties by 3-4 weeks. MET  Improve Lumbar rom to wfl for improved tolerance with adls and home duties by 3-4 weeks. MET\"    LTGs:  \"Improve FOTO score from 45 to 68 indicating improved tolerance with activities involving the low back by discharge. MET  Patient will demonstrate rom and strength to the pelvis and hips wfl for improved tolerance with adls and home duties by discharge.  NEARLY MET  Patient will be free of radicular symptoms by " discharge. NEARLY MET  Pt will be able to lay on R side at night without onset of symptoms NEARLY MET  Pt will be able to sit to tolerate job demands without exacerbation of symptoms NEARLY MET      Plan  Patient would benefit from: skilled physical therapy  Planned modality interventions: cryotherapy and thermotherapy: hydrocollator packs    Planned therapy interventions: abdominal trunk stabilization, ADL retraining, body mechanics training, flexibility, functional ROM exercises, home exercise program, therapeutic exercise, therapeutic activities, stretching, strengthening, postural training, patient education, joint mobilization and manual therapy    Frequency: 1-2x week  Duration in weeks: 4  Plan of Care beginning date: 4/10/2025  Plan of Care expiration date: 5/8/2025  Treatment plan discussed with: patient  Plan details: Patient informed that from this point forward, to ensure adherence to the aforementioned plan of care, all or some of the treatment may be performed and carried out by a Physical Therapy Assistant (PTA) with supervision from a licensed Physical Therapist (PT) in accordance with [facility] Physical Therapy Practice Act.  Patient will continue to benefit from skilled physical therapy to address the functional deficits that were identified during the evaluation today. We will continue to progress the therapy program to address these functional deficits and achieve the established goals.                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  States a pulsating discomfort.  When she lays down at night, states pain is excruciating.  Has tried several sleeping positions with difficulty finding relief.      Saw MD - given a steroid pack and flexiril - states she is finally at a level where pain is not excruciating but still having diff  "with laying down.  Has began walking on treadmill.      Discomfort with sitting      Update: Pt states she is improving - states she is up to 25-30 minutes of walk/run on treadmill several days weekly.  However, states she still continues to feel the pain, ableit less intense.  One episode of pulling hay bails did exacerbate the pain more than anything else.  Feels her core is stronger and she is progressing her hip pain.  Better tolerance to job demands both at farm and at work    3/12 UPDATE: Pt states she is continuing to feel progress.  Last week she was able to help her  lift a turbo heater from the basement without straining SIJ.  Has been continuing with her workout progress.  States the pain/discomfort is \"just always there\".  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   Has follow up with Dr. Yuan this coming Monday.     4/10:   Quality of life: good    Patient Goals  Patient goals for therapy: decreased edema, return to sport/leisure activities and independence with ADLs/IADLs    Pain  Current pain ratin  Location: L SIJ  Quality: sharp  Exacerbated by: laying down at night.    Life stress: demands at farm      Diagnostic Tests  X-ray: abnormal  Treatments  Previous treatment: chiropractic, massage and medication (some relief with chiro manip)  Current treatment: medication        Objective     Static Posture     Comments  Left pelvic obliquity reduced with a MET pelvic clearing     3/12 No apparent pelvic obliquity upon assessment this visit     Active Range of Motion     Lumbar   Flexion: Active lumbar flexion: hands to floor, no knee flexion.  WFL  Extension: 20 (L sided discomfrot) degrees   Left Hip   Flexion: 132 degrees WFL  Extension: 10 degrees   External rotation (90/90): Left hip active external rotation 90/90: Full.   Internal rotation (90/90): Left hip active internal rotation 90/90: Full.     Right " "Hip   Flexion: 131 degrees WFL  Extension: 11 degrees   External rotation (90/90): Right hip active external rotation 90/90: Full.   Internal rotation (90/90): Right hip active internal rotation 90/90: Full.     Additional Active Range of Motion Details  HS 90/90  R: full rom   L: full rom    3/12 HS 90/90 remains full     Strength/Myotome Testing     Left Hip   Planes of Motion   Left hip flexors strength: 35#  Left hip extension strength: 33#  Left hip abductors strength: 38#    Right Hip   Planes of Motion   Right hip flexors strength: 40#  Right hip extension strength: 33#  Right hip abductors strength: 42#    Muscle Activation   Patient able to activate left multifidus and right multifidus.   Patient unable to activate left transverse abdominals and right transverse abdominals.              Precautions: none      Date 3/12 3/19 3/27 4/10 3/3   FOTO        Manuals        IASTM  LLQ TS Stm ds STM TS    STM L glute and piriformis in R sidelying        Lumbar traction     TS with belt in hooklying position 5 min    L hip LAD        Pelvic clearing MET (PRN)        Neuro Re-Ed        Leg Press      SL, 40# 2/15 ea    CC Walkouts      P5 5x    Hip airplanes        Back extensions    35# 3/15    Dead Bug    2/10    Prone press ups    2/10    KB around the world     15# 2/10 ea             Ther Ex        education TS, review of sx, xray, upcoming MD visit   TS    Obj. Updates  TS   TS    Cupping  4 cups placed on paraspinals with HHRs, cat/camel 4 cups placed on paraspinals c HHR,cat/camel, 10x ea     Thread the needle c cups   5x ea     Supine glute stretch  3/15\"  3/15:\"     Nu Step     8m L5    Hip abd/ext/flex on MH     30# 15 ea    HHR     15x    Side glide at wall  10 ea    10 ea   Seated flexion  10/10\"       SL gapping   5x 10\"     Ther Activity                        Gait Training                        Modalities        HP                       "

## 2025-04-17 ENCOUNTER — OFFICE VISIT (OUTPATIENT)
Dept: PHYSICAL THERAPY | Facility: CLINIC | Age: 50
End: 2025-04-17
Attending: FAMILY MEDICINE
Payer: COMMERCIAL

## 2025-04-17 DIAGNOSIS — M47.816 LUMBAR SPONDYLOSIS: ICD-10-CM

## 2025-04-17 DIAGNOSIS — G89.4 CHRONIC PAIN SYNDROME: ICD-10-CM

## 2025-04-17 DIAGNOSIS — M51.16 LUMBAR DISC DISEASE WITH RADICULOPATHY: Primary | ICD-10-CM

## 2025-04-17 PROCEDURE — 97140 MANUAL THERAPY 1/> REGIONS: CPT

## 2025-04-17 PROCEDURE — 97110 THERAPEUTIC EXERCISES: CPT

## 2025-04-17 PROCEDURE — 97112 NEUROMUSCULAR REEDUCATION: CPT

## 2025-04-17 NOTE — PROGRESS NOTES
"Daily Note     Today's date: 2025  Patient name: Heather Reaves  : 1975  MRN: [id number]  Referring provider: Carlos Ruano DO  Dx: No diagnosis found.               Subjective: Pt states she did a bungee class this  and did not have much exacerbation of pain in LLQ.       Objective: See treatment diary below      Assessment: Tolerated treatment well. Patient exhibited good technique with therapeutic exercises.  Pt to trial a week of independence with HEP compliance.       Plan: Continue per plan of care.      Precautions: none      Date 3/12 3/19 3/27 4/10 4/17   FOTO        Manuals        IASTM  LLQ TS Stm ds STM TS    STM L glute and piriformis in R sidelying        Lumbar traction     TS with belt in hooklying position 5 min TS and STM LLQ and LLQ gapping   L hip LAD        Pelvic clearing MET (PRN)        Neuro Re-Ed        Leg Press         CC Walkouts         Back extensions    35# 3/15 40# 2/15    Dead Bug    2/10 Bird Dog 10 ea    Prone press ups    2/10    KB around the world     15# 2/10 ea             Ther Ex        education TS, review of sx, xray, upcoming MD visit   TS    Obj. Updates  TS   TS    Cupping  4 cups placed on paraspinals with HHRs, cat/camel 4 cups placed on paraspinals c HHR,cat/camel, 10x ea     Thread the needle c cups   5x ea  10 ea no cupping    Supine glute stretch  3/15\"  3/15:\"     Nu Step        Hip abd/ext/flex on MH     40# 15 ea    HHR     10    Side glide at wall  10 ea    3/15\"    Seated flexion  10/10\"       SL gapping   5x 10\"     Ther Activity                        Gait Training                        Modalities        HP                       "

## 2025-04-28 ENCOUNTER — CLINICAL SUPPORT (OUTPATIENT)
Age: 50
End: 2025-04-28

## 2025-04-28 VITALS
SYSTOLIC BLOOD PRESSURE: 114 MMHG | WEIGHT: 184.3 LBS | TEMPERATURE: 97.9 F | HEART RATE: 87 BPM | HEIGHT: 70 IN | OXYGEN SATURATION: 100 % | BODY MASS INDEX: 26.39 KG/M2 | DIASTOLIC BLOOD PRESSURE: 78 MMHG

## 2025-04-28 DIAGNOSIS — E66.3 OVERWEIGHT: Primary | ICD-10-CM

## 2025-04-28 PROCEDURE — RECHECK

## 2025-04-28 NOTE — PROGRESS NOTES
Patient last visit weight: 186.8  Patient current visit weight: 184.3    If you are taking phentermine or other oral weight loss medications, are you experiencing any of the following symptoms:  Headache: NO  Blurred Vision: NO   Chest Pain: NO  Palpitations: NO  Insomnia: NO  SPECIFY ORAL MEDICATION AND DOSAGE: PHENTERMINE 37.5 MG    If you are taking an injectable medication,  are you experiencing any of the following symptoms:  Bloating:   Nausea:  Vomiting:   Constipation:   Diarrhea:  SPECIFY INJECTABLE MEDICATION AND CURRENT DOSAGE:  Vitals:    Is BP less than 100/60?  Is BP greater than 140/90?  Is HR greater than 100?  **If yes to any of the above, have patient relax and repeat in 5-10 minutes**    Repeat values:    Is BP less than 100/60?  Is BP greater than 140/90?  Is HR greater than 100?  **If values remain outside of ranges above, please consult provider for next steps**      Date of last injection:    How many injections do you have left:   
None

## 2025-05-03 ENCOUNTER — APPOINTMENT (OUTPATIENT)
Dept: LAB | Facility: MEDICAL CENTER | Age: 50
End: 2025-05-03
Attending: FAMILY MEDICINE
Payer: COMMERCIAL

## 2025-05-03 ENCOUNTER — APPOINTMENT (OUTPATIENT)
Dept: LAB | Facility: MEDICAL CENTER | Age: 50
End: 2025-05-03
Attending: PREVENTIVE MEDICINE
Payer: COMMERCIAL

## 2025-05-03 DIAGNOSIS — R53.83 FATIGUE, UNSPECIFIED TYPE: ICD-10-CM

## 2025-05-03 DIAGNOSIS — Z13.6 ENCOUNTER FOR LIPID SCREENING FOR CARDIOVASCULAR DISEASE: ICD-10-CM

## 2025-05-03 DIAGNOSIS — E83.40 MAGNESIUM DISORDER: ICD-10-CM

## 2025-05-03 DIAGNOSIS — Z13.220 ENCOUNTER FOR LIPID SCREENING FOR CARDIOVASCULAR DISEASE: ICD-10-CM

## 2025-05-03 DIAGNOSIS — R73.9 HYPERGLYCEMIA: ICD-10-CM

## 2025-05-03 DIAGNOSIS — Z00.8 HEALTH EXAMINATION IN POPULATION SURVEY: ICD-10-CM

## 2025-05-03 LAB
ALBUMIN SERPL BCG-MCNC: 4 G/DL (ref 3.5–5)
ALP SERPL-CCNC: 51 U/L (ref 34–104)
ALT SERPL W P-5'-P-CCNC: 15 U/L (ref 7–52)
ANION GAP SERPL CALCULATED.3IONS-SCNC: 8 MMOL/L (ref 4–13)
AST SERPL W P-5'-P-CCNC: 13 U/L (ref 13–39)
BASOPHILS # BLD AUTO: 0.02 THOUSANDS/ÂΜL (ref 0–0.1)
BASOPHILS NFR BLD AUTO: 0 % (ref 0–1)
BILIRUB SERPL-MCNC: 0.57 MG/DL (ref 0.2–1)
BUN SERPL-MCNC: 11 MG/DL (ref 5–25)
CALCIUM SERPL-MCNC: 8.7 MG/DL (ref 8.4–10.2)
CHLORIDE SERPL-SCNC: 103 MMOL/L (ref 96–108)
CHOLEST SERPL-MCNC: 181 MG/DL (ref ?–200)
CO2 SERPL-SCNC: 27 MMOL/L (ref 21–32)
CREAT SERPL-MCNC: 0.68 MG/DL (ref 0.6–1.3)
EOSINOPHIL # BLD AUTO: 0.13 THOUSAND/ÂΜL (ref 0–0.61)
EOSINOPHIL NFR BLD AUTO: 2 % (ref 0–6)
ERYTHROCYTE [DISTWIDTH] IN BLOOD BY AUTOMATED COUNT: 12.4 % (ref 11.6–15.1)
EST. AVERAGE GLUCOSE BLD GHB EST-MCNC: 123 MG/DL
GFR SERPL CREATININE-BSD FRML MDRD: 103 ML/MIN/1.73SQ M
GLUCOSE P FAST SERPL-MCNC: 88 MG/DL (ref 65–99)
HBA1C MFR BLD: 5.9 %
HCT VFR BLD AUTO: 42.4 % (ref 34.8–46.1)
HDLC SERPL-MCNC: 44 MG/DL
HGB BLD-MCNC: 13.4 G/DL (ref 11.5–15.4)
IMM GRANULOCYTES # BLD AUTO: 0.03 THOUSAND/UL (ref 0–0.2)
IMM GRANULOCYTES NFR BLD AUTO: 0 % (ref 0–2)
LDLC SERPL CALC-MCNC: 117 MG/DL (ref 0–100)
LYMPHOCYTES # BLD AUTO: 1.69 THOUSANDS/ÂΜL (ref 0.6–4.47)
LYMPHOCYTES NFR BLD AUTO: 25 % (ref 14–44)
MAGNESIUM SERPL-MCNC: 2.1 MG/DL (ref 1.9–2.7)
MCH RBC QN AUTO: 29.2 PG (ref 26.8–34.3)
MCHC RBC AUTO-ENTMCNC: 31.6 G/DL (ref 31.4–37.4)
MCV RBC AUTO: 92 FL (ref 82–98)
MONOCYTES # BLD AUTO: 0.68 THOUSAND/ÂΜL (ref 0.17–1.22)
MONOCYTES NFR BLD AUTO: 10 % (ref 4–12)
NEUTROPHILS # BLD AUTO: 4.33 THOUSANDS/ÂΜL (ref 1.85–7.62)
NEUTS SEG NFR BLD AUTO: 63 % (ref 43–75)
NRBC BLD AUTO-RTO: 0 /100 WBCS
PLATELET # BLD AUTO: 276 THOUSANDS/UL (ref 149–390)
PMV BLD AUTO: 11 FL (ref 8.9–12.7)
POTASSIUM SERPL-SCNC: 4.1 MMOL/L (ref 3.5–5.3)
PROT SERPL-MCNC: 6.9 G/DL (ref 6.4–8.4)
RBC # BLD AUTO: 4.59 MILLION/UL (ref 3.81–5.12)
SODIUM SERPL-SCNC: 138 MMOL/L (ref 135–147)
TRIGL SERPL-MCNC: 99 MG/DL (ref ?–150)
TSH SERPL DL<=0.05 MIU/L-ACNC: 1.26 UIU/ML (ref 0.45–4.5)
WBC # BLD AUTO: 6.88 THOUSAND/UL (ref 4.31–10.16)

## 2025-05-03 PROCEDURE — 83735 ASSAY OF MAGNESIUM: CPT

## 2025-05-03 PROCEDURE — 80053 COMPREHEN METABOLIC PANEL: CPT

## 2025-05-03 PROCEDURE — 84443 ASSAY THYROID STIM HORMONE: CPT

## 2025-05-03 PROCEDURE — 80061 LIPID PANEL: CPT

## 2025-05-03 PROCEDURE — 83036 HEMOGLOBIN GLYCOSYLATED A1C: CPT

## 2025-05-03 PROCEDURE — 85025 COMPLETE CBC W/AUTO DIFF WBC: CPT

## 2025-05-03 PROCEDURE — 36415 COLL VENOUS BLD VENIPUNCTURE: CPT

## 2025-05-05 ENCOUNTER — RESULTS FOLLOW-UP (OUTPATIENT)
Dept: FAMILY MEDICINE CLINIC | Facility: CLINIC | Age: 50
End: 2025-05-05

## 2025-05-15 ENCOUNTER — TELEPHONE (OUTPATIENT)
Age: 50
End: 2025-05-15

## 2025-05-15 NOTE — TELEPHONE ENCOUNTER
Please see scanned prior auth for Phentermine HCL 37.5 received from [facility] pharmacy. Thank you.

## 2025-05-28 ENCOUNTER — HOSPITAL ENCOUNTER (OUTPATIENT)
Facility: HOSPITAL | Age: 50
Setting detail: OUTPATIENT SURGERY
Discharge: HOME | End: 2025-05-28
Attending: ANESTHESIOLOGY | Admitting: ANESTHESIOLOGY
Payer: COMMERCIAL

## 2025-05-28 ENCOUNTER — APPOINTMENT (OUTPATIENT)
Dept: RADIOLOGY | Facility: HOSPITAL | Age: 50
End: 2025-05-28
Payer: COMMERCIAL

## 2025-05-28 VITALS
OXYGEN SATURATION: 100 % | HEIGHT: 70 IN | WEIGHT: 182 LBS | SYSTOLIC BLOOD PRESSURE: 160 MMHG | RESPIRATION RATE: 18 BRPM | TEMPERATURE: 98.7 F | HEART RATE: 81 BPM | DIASTOLIC BLOOD PRESSURE: 86 MMHG | BODY MASS INDEX: 26.05 KG/M2

## 2025-05-28 DIAGNOSIS — M47.816 LUMBAR SPONDYLOSIS: Primary | ICD-10-CM

## 2025-05-28 DIAGNOSIS — M54.50 LEFT LOW BACK PAIN: ICD-10-CM

## 2025-05-28 DIAGNOSIS — M51.16 LUMBAR DISC DISEASE WITH RADICULOPATHY: ICD-10-CM

## 2025-05-28 LAB
EXT PREGNANCY TEST URINE: NEGATIVE
EXT. CONTROL: NORMAL

## 2025-05-28 PROCEDURE — 64483 NJX AA&/STRD TFRM EPI L/S 1: CPT | Performed by: ANESTHESIOLOGY

## 2025-05-28 PROCEDURE — 64484 NJX AA&/STRD TFRM EPI L/S EA: CPT | Performed by: ANESTHESIOLOGY

## 2025-05-28 PROCEDURE — 81025 URINE PREGNANCY TEST: CPT | Performed by: ANESTHESIOLOGY

## 2025-05-28 RX ORDER — DEXAMETHASONE SODIUM PHOSPHATE 10 MG/ML
INJECTION, SOLUTION INTRAMUSCULAR; INTRAVENOUS AS NEEDED
Status: DISCONTINUED | OUTPATIENT
Start: 2025-05-28 | End: 2025-05-28 | Stop reason: HOSPADM

## 2025-05-28 RX ORDER — METHYLPREDNISOLONE ACETATE 80 MG/ML
INJECTION, SUSPENSION INTRA-ARTICULAR; INTRALESIONAL; INTRAMUSCULAR; SOFT TISSUE AS NEEDED
Status: DISCONTINUED | OUTPATIENT
Start: 2025-05-28 | End: 2025-05-28 | Stop reason: HOSPADM

## 2025-05-28 RX ORDER — LIDOCAINE HYDROCHLORIDE 10 MG/ML
INJECTION, SOLUTION EPIDURAL; INFILTRATION; INTRACAUDAL; PERINEURAL AS NEEDED
Status: DISCONTINUED | OUTPATIENT
Start: 2025-05-28 | End: 2025-05-28 | Stop reason: HOSPADM

## 2025-05-28 NOTE — H&P
Assessment:  1. Lumbar spondylosis  CANCELED: [place] spine and pain procedure    CANCELED: [place] spine and pain procedure      2. Left low back pain  CANCELED: [place] spine and pain procedure    CANCELED: [place] spine and pain procedure      3. Lumbar disc disease with radiculopathy  CANCELED: [place] spine and pain procedure    CANCELED: [place] spine and pain procedure          Plan:  Heather Reaves is a 49 y.o. female with complaints of low back and left leg pain presents to surgical center for procedure.  We will perform a Procedure(s) (LRB):  Left L4-L5 and L5-S1 transforaminal dural steroid injection (Left)   2. Follow-up 1 month after injection    Complete risks and benefits including bleeding, infection, tissue reaction, nerve injury and allergic reaction were discussed. The approach was demonstrated using models and literature was provided. Verbal and written consent was obtained.    My impressions and treatment recommendations were discussed in detail with the patient who verbalized understanding and had no further questions.  Discharge instructions were provided. I personally saw and examined the patient and I agree with the above discussed plan of care.    Orders Placed This Encounter   Procedures    FL spine and pain procedure     Standing Status:   Standing     Number of Occurrences:   1     Reason for Exam::   Pain management     Is the patient pregnant?:   Unknown     Is an anticoagulant hold needed?:   No    POCT pregnancy, urine     Standing Status:   Standing     Number of Occurrences:   1     No orders of the defined types were placed in this encounter.      History of Present Illness:  Heather Reaves is a 49 y.o. female who presents for a follow up office visit in regards to low back and left leg pain.   The patient’s current symptoms include constant 7/10 sharp, stabbing, throbbing pain without any particular time pattern.      I have personally reviewed and/or updated the patient's past medical history, past  surgical history, family history, social history, current medications, allergies, and vital signs today.     Review of Systems   Musculoskeletal:  Positive for arthralgias and back pain.   All other systems reviewed and are negative.      Problem List[1]    Past Medical History[2]    Past Surgical History[3]    Family History[4]    Social History     Occupational History    Not on file   Tobacco Use    Smoking status: Former     Types: Cigarettes    Smokeless tobacco: Never   Vaping Use    Vaping status: Never Used   Substance and Sexual Activity    Alcohol use: Not Currently     Comment: occasional     Drug use: Never    Sexual activity: Yes     Partners: Male     Birth control/protection: Rhythm       No current facility-administered medications on file prior to encounter.     Current Outpatient Medications on File Prior to Encounter   Medication Sig    albuterol (PROVENTIL HFA,VENTOLIN HFA) 90 mcg/act inhaler Inhale 2 puffs every 6 (six) hours as needed for wheezing    cyclobenzaprine (FLEXERIL) 10 mg tablet Take 1 tablet (10 mg total) by mouth daily at bedtime as needed for muscle spasms    fluticasone (FLONASE) 50 mcg/act nasal spray 2 sprays into each nostril daily    ibuprofen (MOTRIN) 600 mg tablet Take 1 tablet (600 mg total) by mouth every 8 (eight) hours as needed for moderate pain    ketoconazole (NIZORAL) 2 % shampoo Apply 1 Application topically 2 (two) times a week for 30 doses    Magnesium Oxide 400 MG CAPS Take 1 tablet 1 to 2 times daily with onset of menstrual symptoms    phentermine (ADIPEX-P) 37.5 MG tablet Take 1 tablet (37.5 mg total) by mouth every morning Before breakfast or 1-2 hours after    EPINEPHrine (EPIPEN) 0.3 mg/0.3 mL SOAJ Inject 0.3 mL (0.3 mg total) into a muscle once for 1 dose PRN bee sting    scopolamine (TRANSDERM-SCOP) 1 mg/3 days TD 72 hr patch PLACE 1 PATCH ON THE SKIN OVER 72 HOURS EVERY THIRD DAY (Patient not taking: Reported on 3/24/2025)       Allergies[5]    Physical  "Exam:    /87   Pulse 82   Temp (!) 97.4 °F (36.3 °C) (Temporal)   Resp 18   Ht 5' 10\" (1.778 m)   Wt 82.6 kg (182 lb)   SpO2 100%   BMI 26.11 kg/m²     Constitutional:normal, well developed, well nourished, alert, in no distress and non-toxic and no overt pain behavior.  Eyes:anicteric  HEENT:grossly intact  Neck:supple, symmetric, trachea midline and no masses   Pulmonary:even and unlabored  Cardiovascular:No edema or pitting edema present  Skin:Normal without rashes or lesions and well hydrated  Psychiatric:Mood and affect appropriate  Neurologic:Cranial Nerves II-XII grossly intact  Musculoskeletal:normal             [1]   Patient Active Problem List  Diagnosis    Family history of colon cancer    Family history of uterine cancer    GERD (gastroesophageal reflux disease)    Bloating    History of colon polyps    Non-seasonal allergic rhinitis due to pollen    PMS (premenstrual syndrome)    Allergy to honey bee venom    Sprain of right foot    Inversion sprain of right ankle    Overweight    Seborrheic dermatitis    Lumbar disc disease with radiculopathy    Hyperglycemia    Magnesium disorder    Degeneration of intervertebral disc of lumbar spine without disc herniation    Left low back pain    Piriformis syndrome of left side    Chronic pain syndrome    Lumbar spondylosis   [2]   Past Medical History:  Diagnosis Date    Abnormal Pap smear of cervix     Allergic rhinitis     Resolved 7/14/2017     Anxiety     Generalized anxiety disorder     Resolved 7/14/2017    [3]   Past Surgical History:  Procedure Laterality Date    COLPOSCOPY      NO PAST SURGERIES     [4]   Family History  Problem Relation Name Age of Onset    Cancer Mother Erika merrill     Colon cancer Mother Erika merrill     Colon polyps Mother Erika maderaiconis     Uterine cancer Mother Erika merrill     Heart disease Father      No Known Problems Sister      No Known Problems Daughter      No Known Problems Daughter      " Colon cancer Maternal Grandmother      No Known Problems Paternal Grandmother      Colon cancer Cousin Maternal     No Known Problems Maternal Aunt      No Known Problems Maternal Aunt      No Known Problems Maternal Aunt     [5]   Allergies  Allergen Reactions    Bee Venom Anaphylaxis    Food Facial Swelling     Lobster

## 2025-05-28 NOTE — DISCHARGE INSTR - AVS FIRST PAGE
Epidural Steroid Injection   WHAT YOU NEED TO KNOW:   An epidural steroid injection (HEAVEN) is a procedure to inject steroid medicine into the epidural space. The epidural space is between your spinal cord and vertebrae. Steroids reduce inflammation and fluid buildup in your spine that may be causing pain. You may be given pain medicine along with the steroids.          ACTIVITY  Do not drive or operate machinery today.  No strenuous activity today - bending, lifting, etc.  You may resume normal activites starting tomorrow - start slowly and as tolerated.  You may shower today, but no tub baths or hot tubs.  You may have numbness for several hours from the local anesthetic. Please use caution and common sense, especially with weight-bearing activities.    CARE OF THE INJECTION SITE  If you have soreness or pain, apply ice to the area today (20 minutes on/20 minutes off).  Starting tomorrow, you may use warm, moist heat or ice if needed.  You may have an increase or change in your discomfort for 36-48 hours after your treatment.  Apply ice and continue with any pain medication you have been prescribed.  Notify [facility] if you have any of the following: redness, drainage, swelling, headache, stiff neck or fever above 100°F.    SPECIAL INSTRUCTIONS  Our office will contact you in approximately 14 days for a progress report.    MEDICATIONS  Continue to take all routine medications.  Our office may have instructed you to hold some medications.    As no general anesthesia was used in today's procedure, you should not experience any side effects related to anesthesia.     If you are diabetic, the steroids used in today's injection may temporarily increase your blood sugar levels after the first few days after your injection. Please keep a close eye on your sugars and alert the doctor who manages your diabetes if your sugars are significantly high from your baseline or you are symptomatic.     If you have a  problem specifically related to your procedure, please call our office at [phone].  Problems not related to your procedure should be directed to your primary care physician.

## 2025-06-02 ENCOUNTER — CLINICAL SUPPORT (OUTPATIENT)
Age: 50
End: 2025-06-02

## 2025-06-02 VITALS — WEIGHT: 180.4 LBS | HEIGHT: 70 IN | BODY MASS INDEX: 25.83 KG/M2

## 2025-06-02 DIAGNOSIS — R63.5 ABNORMAL WEIGHT GAIN: Primary | ICD-10-CM

## 2025-06-02 PROCEDURE — RECHECK: Performed by: DIETITIAN, REGISTERED

## 2025-06-02 PROCEDURE — WEIGHT: Performed by: DIETITIAN, REGISTERED

## 2025-06-02 NOTE — PROGRESS NOTES
Weight Management Medical Nutrition Assessment  Heather presented today for body composition. . She is pursuing conservative program. Today on Tanita scale she weighed 180.4# which reflects a loss of 26.8# since beginning with weight management. Reviewed body composition results with Heather and provided her with copy of same as well as with Tanita results explanation sheet. She stated A1c increased despite improved eating habits and weight loss. Due to her concern over same, provided Heather with and briefly reviewed handouts Carbohydrate Counting for People with Diabetes and Diabetes Label-Reading. Heather with good understanding. She has follow-up appointment with [facility] provider 8/11/25       Patient seen by Medical Provider in past 6 months:  yes  Requested to schedule appointment with Medical Provider: No      Anthropometric Measurements  Start Weight (#): 207.2# 7/22/24 with [facility] provider  Current Weight (#): 180.4#  TBW % Change from start weight: 12.9%  Ideal Body Weight (#):150# (68.2 kg)  Goal Weight (#):165#  Highest:212#   Lowest:165#    Weight Loss History  Previous weight loss attempts: none identified     Food and Nutrition Related History- this entire section not updated 6/2/25 visit  Wake up: 6 AM   Bed Time:10:30 PM; generally sleeps well; no night-time eating    Food Recall  Breakfast:6:30-6:45 AM-  makes breakfast; 1 fried egg, piece of meat such as dawson or scrapple, 1 slice toast or Eng muffin or wrap; eggs with meat, with butter; having less than in the past; may use some protein powder in the AM   Snack:9 AM- Greek yogurt (20 g), sometimes chocolate dusted almonds  Lunch:12:00 PM- packing lunch more often: chili OR chicken salad with gray with lettuce, possibly with crackers OR soup OR  slice pizza occ  Snack: on ride home, may have marcella dusted almonds    Dinner: 6:15-6:30 PM- meat, vegetable, starch, ; stated eating more veggies; thinks portions sizes not well-controlled  Snack: not usually        Beverages: water- at least 4 bottles, hot tea- 12 oz with honey, no coffee, no soda, ETOH occ only socially  Volume of beverage intake: >=80 oz    Weekends: usually not, but due to preparing her home for sale, it is less regimented, eating quicker things   Cravings: occ struggles with sweets, but thinks this is more of a habit  Trouble area of day:when gets home from work    Frequency of Eating out: cut back from 5 days per week to 3x/week  Food restrictions:stated she has allergies such as wheat and peanut, but noted she has no reaction despite having a panel done  Cooking: self   Food Shopping: self    Physical Activity Intake- not updated 6/2/25 visit  Activity: exercising 6 days per week (see narrative above for detail) and is busy at work and on farm, preparing house for sale  Frequency:daily  Physical limitations/barriers to exercise: none    Estimated Needs- not updated 6/2/25 visit  Energy  SECA: BMR:n/a      X 1.3 -1000 =  [place] [employer] Needs: BMR: 1551   1-2# loss weekly sedentary:  861-1361             1-2# loss weekly lightly active:4246-9651  1-2# loss weekly daily or intense exercise  6856-6123 (Calculated 3/3/25)  Maintenance calories for sedentary activity level: 1861  Protein: g      (1.2-1.5g/kg IBW)  Total Fluid: 93 oz     (Larissa-Segar Method)  Free Fluid: 74 oz (Larissa-Segar Method - 20%)      Nutrition Diagnosis  Yes;    Overweight/obesity  related to Excess energy intake as evidenced by  BMI more than normative standard for age and sex (overweight 25-29.9)       Nutrition Intervention    Nutrition Prescription  Calories:0944-3414  Protein: g  Fluid:>= 74-93 oz    Meal Plan (Omkar/Pro/Carb)  Previously provided Heather with 1904-2161 kcal sample meal plan which remains appropriate    Diet Education:    Calorie controlled menu  Lean protein food choices  Healthy snack options  Food journaling tips  Above previously provided; 6/2/25 provided education on carb counting  and label-reading        Nutrition Counseling:  Strategies: meal planning, portion sizes, healthy snack choices, hydration, fiber intake, protein intake, exercise, food journal      Monitoring and Evaluation:  Evaluation criteria:  Energy Intake  Meet protein needs  Maintain adequate hydration  Monitor weekly weight  Meal planning/preparation  Food journal   Decreased portions at mealtimes and snacks  Physical activity     Barriers to learning:none  Readiness to change: Action:  (Changing behavior)  Comprehension: very good  Expected Compliance: very good

## 2025-06-11 ENCOUNTER — TELEPHONE (OUTPATIENT)
Dept: PAIN MEDICINE | Facility: CLINIC | Age: 50
End: 2025-06-11

## 2025-06-17 DIAGNOSIS — E66.3 OVERWEIGHT: ICD-10-CM

## 2025-06-17 NOTE — TELEPHONE ENCOUNTER
05/15/2025 04/01/2025 Phentermine Hcl (Tablet) 30.0 30 37.5 MG NA [facility], [facility]  Private Pay 1 / 1 PA       1 [id number] 04/12/2025 04/12/2025 04/01/2025 Phentermine Hcl (Tablet) 30.0 30 37.5 MG NA EDISON, [facility] -[phone] Private Pay 0 / 1 PA    1 [id number] 03/02/2025 03/02/2025 01/20/2025 Phentermine Hcl (Tablet) 30.0 30 37.5 MG NA EDISON, [facility]

## 2025-06-18 ENCOUNTER — OFFICE VISIT (OUTPATIENT)
Dept: PAIN MEDICINE | Facility: CLINIC | Age: 50
End: 2025-06-18
Payer: COMMERCIAL

## 2025-06-18 VITALS
OXYGEN SATURATION: 98 % | BODY MASS INDEX: 25.77 KG/M2 | TEMPERATURE: 97.7 F | HEIGHT: 70 IN | HEART RATE: 88 BPM | WEIGHT: 180 LBS | RESPIRATION RATE: 16 BRPM

## 2025-06-18 DIAGNOSIS — M51.16 LUMBAR DISC DISEASE WITH RADICULOPATHY: Primary | ICD-10-CM

## 2025-06-18 DIAGNOSIS — M47.816 LUMBAR SPONDYLOSIS: ICD-10-CM

## 2025-06-18 DIAGNOSIS — M62.838 SPASM OF LEFT PIRIFORMIS MUSCLE: ICD-10-CM

## 2025-06-18 PROCEDURE — 99214 OFFICE O/P EST MOD 30 MIN: CPT | Performed by: ANESTHESIOLOGY

## 2025-06-18 RX ORDER — PHENTERMINE HYDROCHLORIDE 37.5 MG/1
37.5 TABLET ORAL EVERY MORNING
Qty: 30 TABLET | Refills: 0 | Status: SHIPPED | OUTPATIENT
Start: 2025-06-18

## 2025-06-18 RX ORDER — CYCLOBENZAPRINE HCL 10 MG
10 TABLET ORAL
Qty: 30 TABLET | Refills: 0 | Status: SHIPPED | OUTPATIENT
Start: 2025-06-18

## 2025-06-18 NOTE — PROGRESS NOTES
Name: Heather Reaves      : 1975      MRN: [id number]  Encounter Provider: Derrick Yuan MD  Encounter Date: 2025   Encounter department: [facility] [place]    Assessment & Plan  Spasm of left piriformis muscle    Orders:    cyclobenzaprine (FLEXERIL) 10 mg tablet; Take 1 tablet (10 mg total) by mouth daily at bedtime as needed for muscle spasms    Lumbar disc disease with radiculopathy         Lumbar spondylosis         Patient is a 49-year-old female complains of left-sided back pain with chronic pain syndrome secondary to piriformis syndrome presents to office for follow-up visit.  Patient is status post left L4-L5 and L5-S1 transforaminal epidural steroid injection performed on 2025.  She reports 80% alleviation of symptoms.  Patient's relief is still ongoing at this time.  1.  We will refill Flexeril for muscle spasms  2.  Follow-up as needed  3.  Patient will continue home exercise program    My impressions and treatment recommendations were discussed in detail with the patient who verbalized understanding and had no further questions.  Discharge instructions were provided. I personally saw and examined the patient and I agree with the above discussed plan of care.    History of Present Illness     Heather Reaves is a 49 y.o. female who presents for a follow up office visit in regards to Back Pain. The patient’s current symptoms include 1/10 occasional cramping, pressure-like pain without particular time pattern.         Opioid contract date    Last UDS Date: No results in last 5 years                    last taken on    Review of Systems   Musculoskeletal:  Positive for back pain.        Back stiffness   All other systems reviewed and are negative.      Medical History Reviewed by provider this encounter:     .  Past Medical History   Past Medical History[1]  Past Surgical History[2]  Family History[3]   reports that she has quit smoking. Her smoking use included  "cigarettes. She has never used smokeless tobacco. She reports that she does not currently use alcohol. She reports that she does not use drugs.  Current Outpatient Medications   Medication Instructions    albuterol (PROVENTIL HFA,VENTOLIN HFA) 90 mcg/act inhaler 2 puffs, Inhalation, Every 6 hours PRN    cyclobenzaprine (FLEXERIL) 10 mg, Oral, Daily at bedtime PRN    EPINEPHrine (EPIPEN) 0.3 mg, Intramuscular, Once, PRN bee sting    fluticasone (FLONASE) 50 mcg/act nasal spray 2 sprays, Nasal, Daily    ibuprofen (MOTRIN) 600 mg, Oral, Every 8 hours PRN    ketoconazole (NIZORAL) 2 % shampoo 1 Application, Topical, 2 times weekly    Magnesium Oxide 400 MG CAPS Take 1 tablet 1 to 2 times daily with onset of menstrual symptoms    phentermine (ADIPEX-P) 37.5 mg, Oral, Every morning, Before breakfast or 1-2 hours after   Allergies[4]   Medications Ordered Prior to Encounter[5]   Social History[6]     Objective   Pulse 88   Temp 97.7 °F (36.5 °C)   Resp 16   Ht 5' 10\" (1.778 m)   Wt 81.6 kg (180 lb)   SpO2 98%   BMI 25.83 kg/m²         Physical Exam  Constitutional: normal, well developed, well nourished, alert, in no distress and non-toxic and no overt pain behavior.  Eyes: anicteric  HEENT: grossly intact  Neck: supple, symmetric, trachea midline and no masses   Pulmonary: even and unlabored  Cardiovascular: No edema or pitting edema present  Skin: Normal without rashes or lesions and well hydrated  Psychiatric: Mood and affect appropriate  Neurologic: Cranial Nerves II-XII grossly intact  Musculoskeletal: antalgic        Study Result    Narrative & Impression   MRI LUMBAR SPINE WITHOUT CONTRAST     INDICATION: M51.362: Other intervertebral disc degeneration, lumbar region with discogenic back pain and lower extremity pain  M51.16: Intervertebral disc disorders with radiculopathy, lumbar region  G89.4: Chronic pain syndrome.     COMPARISON: Lumbar radiographs 12/4/2024     TECHNIQUE:  Multiplanar, multisequence " imaging of the lumbar spine was performed. .        IMAGE QUALITY:  Diagnostic     FINDINGS:     VERTEBRAL BODIES:  There are 5 lumbar type vertebral bodies. Mild levoscoliosis, apex at L3. No compression fracture. Superior L3 endplate Schmorl's node is noted with adjacent Modic type I endplate changes.     SACRUM:  Normal signal within the sacrum. No evidence of insufficiency or stress fracture. Mild marrow edema at the superior left sacral ala, likely degenerative.     DISTAL CORD AND CONUS:  Normal size and signal within the distal cord and conus. The conus terminates at the L1-2 level.  The cauda equina nerve roots appear within normal limits.     PARASPINAL SOFT TISSUES:  Paraspinal soft tissues are unremarkable.     LOWER THORACIC DISC SPACES:  Normal disc height and signal.  No disc herniation, canal stenosis or foraminal narrowing.     LUMBAR DISC SPACES:     L1-2: No focal disc herniation, central canal stenosis, or neural foraminal narrowing.     L2-3: Mild disc bulge without central canal or neural foraminal narrowing.     L3-4: Mild to moderate diffuse disc bulge extending into the foraminal regions. Bilateral ligamentum flavum and facet hypertrophy. No central canal stenosis. Mild to moderate bilateral subarticular/lateral recess narrowing. Mild to moderate bilateral   neural foraminal stenosis.     L4-5: Mild to moderate diffuse disc bulge with a left posterior annular fissure. Bilateral ligamentum flavum and facet hypertrophy.  No central canal stenosis. Mild bilateral subarticular/lateral recess narrowing. Mild to moderate right and moderate to   severe left neural foraminal stenosis with encroachment of the exiting left L4 nerve root.     L5-S1: Tiny central disc protrusion with bilateral facet hypertrophy.  No central canal or  subarticular/lateral recess narrowing. Moderate left neural foraminal stenosis with encroachment of the exiting left L5 nerve root.     IMPRESSION:     Mild levoscoliosis,  apex at L3.     Multilevel lumbar degenerative disc disease as detailed without significant central canal narrowing. Moderate to severe left L4-5 and L5-S1 neural foraminal narrowing with encroachment of the exiting left L4 and L5 nerve roots. Correlate with   radiculopathy symptoms.        Workstation performed: [id number]                   [1]   Past Medical History:  Diagnosis Date    Abnormal Pap smear of cervix     Allergic rhinitis     Resolved 7/14/2017     Anxiety     Generalized anxiety disorder     Resolved 7/14/2017    [2]   Past Surgical History:  Procedure Laterality Date    COLPOSCOPY      EPIDURAL BLOCK INJECTION Left 5/28/2025    Procedure: Left L4-L5 and L5-S1 transforaminal dural steroid injection;  Surgeon: Derrick Yuan MD;  Location: [facility];  Service: Pain Management     NO PAST SURGERIES     [3]   Family History  Problem Relation Name Age of Onset    Cancer Mother Erika weliconis     Colon cancer Mother Erika weliconis     Colon polyps Mother Erika weliconis     Uterine cancer Mother Erika weliconis     Heart disease Father      No Known Problems Sister      No Known Problems Daughter      No Known Problems Daughter      Colon cancer Maternal Grandmother      No Known Problems Paternal Grandmother      Colon cancer Cousin Maternal     No Known Problems Maternal Aunt      No Known Problems Maternal Aunt      No Known Problems Maternal Aunt     [4]   Allergies  Allergen Reactions    Bee Venom Anaphylaxis    Food Facial Swelling     Lobster   [5]   Current Outpatient Medications on File Prior to Visit   Medication Sig Dispense Refill    albuterol (PROVENTIL HFA,VENTOLIN HFA) 90 mcg/act inhaler Inhale 2 puffs every 6 (six) hours as needed for wheezing 18 g 0    EPINEPHrine (EPIPEN) 0.3 mg/0.3 mL SOAJ Inject 0.3 mL (0.3 mg total) into a muscle once for 1 dose PRN bee sting 0.6 mL 0    fluticasone (FLONASE) 50 mcg/act nasal spray 2 sprays into each nostril daily 16 g 3     ibuprofen (MOTRIN) 600 mg tablet Take 1 tablet (600 mg total) by mouth every 8 (eight) hours as needed for moderate pain 90 tablet 1    ketoconazole (NIZORAL) 2 % shampoo Apply 1 Application topically 2 (two) times a week for 30 doses 120 mL 5    Magnesium Oxide 400 MG CAPS Take 1 tablet 1 to 2 times daily with onset of menstrual symptoms  0    phentermine (ADIPEX-P) 37.5 MG tablet Take 1 tablet (37.5 mg total) by mouth every morning Before breakfast or 1-2 hours after 30 tablet 1    [DISCONTINUED] cyclobenzaprine (FLEXERIL) 10 mg tablet Take 1 tablet (10 mg total) by mouth daily at bedtime as needed for muscle spasms 30 tablet 0     No current facility-administered medications on file prior to visit.   [6]   Social History  Tobacco Use    Smoking status: Former     Types: Cigarettes    Smokeless tobacco: Never   Vaping Use    Vaping status: Never Used   Substance and Sexual Activity    Alcohol use: Not Currently     Comment: occasional     Drug use: Never    Sexual activity: Yes     Partners: Male     Birth control/protection: Rhythm

## 2025-06-18 NOTE — PATIENT INSTRUCTIONS
Patient Education     Core Strengthening Exercises on Back or on Hands and Knees   About this topic   Your core muscles are in your chest, back, buttock, and stomach area. They are your abdominal, back, and pelvis muscles. These muscles help keep your body stable when using your arms or legs. They also help with balance and posture. There are many exercises you can do to keep these muscles strong.  If you have back problems like a compression fracture or a ruptured disc, doing some of these exercises could make your problem worse. Some of these exercises may cause lower back pain.  General   Before starting with a program, ask your doctor if you are healthy enough to do these exercises. Your doctor may have you work with a , chiropractor, or physical therapist to make a safe exercise program to meet your needs.  Strengthening Exercises   Strengthening exercises keep your muscles firm and strong. Start by repeating each exercise 2 to 3 times. Work up to doing each exercise 10 times. Try to do the exercises 2 to 3 times each day. Hold each exercise for 3 to 5 seconds. Do all exercises slowly.  Hip lifts ? Lie on your back with your knees bent and feet flat on the floor. Tighten your stomach muscles and push your heels into the floor to lift your buttocks off the floor. Relax.  Pelvic tilts ? Lie on your back with your knees bent and feet flat on the floor. Tighten your stomach muscles and press your lower back down to the floor. Relax.  Straight leg raises lying down ? Lie on your back with one leg straight. Bend your other knee so the foot is flat on the bed. Keeping your leg straight, lift the leg up to the level of your other knee. Lower it back down. Repeat with the other leg.  Knee flex lying down ? Lie on your back with both knees bent and your feet flat on the floor. Tighten your belly muscles. Raise one leg up and back down as if you are marching in slow motion. Keep belly muscles tight while you move  your leg. Switch legs. To make this exercise harder, raise both arms straight up in the air. Tighten your belly muscles. When you raise one leg up, reach the opposite arm over your head. Switch, moving the opposite arm and leg until you have done 10 repetitions on each side.  Abdominal crunches ? Lie on your back with both knees bent. Keep your feet flat on the floor. Place your hands in one of these positions. Try starting with the first position since it is the easiest. As you get better, use the other positions to make it harder.  Crunches with arms at sides.  Crunches with arms across chest.  Crunches with arms behind head. Be careful not to interlock your fingers behind your neck or head while doing crunches. This may add tension to your neck and cause strain.  Look at the ceiling. Tighten your belly muscles and lift your shoulders and upper back off the floor. Breathe out while you are doing this. Lower your shoulders to the floor. Breathe in while you are doing this. Relax your belly muscles all the way before starting another crunch.  Arm and leg lifts on hands and knees ? Start on your hands and knees. With all of these exercises, keep your back as level as possible. If you are having trouble with this, you may want to put a small object on your back such as a book. If it falls off, you are not keeping your back level enough during the exercise.  Lift one arm up to shoulder level and hold. Lower it back down. Now, lift up the other arm and hold.  Lift one leg up and kick it straight out until it is in line with your back and hold. Lower it back down. Now, lift up the other leg and hold.  Lift one arm and the OPPOSITE leg up at the same time and hold. Lower them down. Now, repeat using the other arm and leg. This is a very hard exercise. It may take time to be able to do this.               What will the results be?   Stronger core  Better balance  More toned belly and back muscles  Easier to do daily  activities  Better sports performance  Less low back pain  Helpful tips   Stay active and work out to keep your muscles strong and flexible.  Keep a healthy weight to avoid putting too much stress on your spine. Eat a healthy diet to keep your muscles healthy.  Be sure you do not hold your breath when exercising. This can raise your blood pressure. If you tend to hold your breath, try counting out loud when exercising. If any exercise bothers you, stop right away.  Try walking or cycling at an easy pace for a few minutes to warm up your muscles. Do this again after exercising.  Exercise may be slightly uncomfortable, but you should not have sharp pains. If you do get sharp pains, stop what you are doing. If the sharp pains continue, call your doctor.  Last Reviewed Date   2021-03-18  Consumer Information Use and Disclaimer   This generalized information is a limited summary of diagnosis, treatment, and/or medication information. It is not meant to be comprehensive and should be used as a tool to help the user understand and/or assess potential diagnostic and treatment options. It does NOT include all information about conditions, treatments, medications, side effects, or risks that may apply to a specific patient. It is not intended to be medical advice or a substitute for the medical advice, diagnosis, or treatment of a health care provider based on the health care provider's examination and assessment of a patient’s specific and unique circumstances. Patients must speak with a health care provider for complete information about their health, medical questions, and treatment options, including any risks or benefits regarding use of medications. This information does not endorse any treatments or medications as safe, effective, or approved for treating a specific patient. [employer] and its affiliates disclaim any warranty or liability relating to this information or the use thereof. The use of this information  is governed by the Terms of Use, available at https://www.woltersScripteduwer.com/en/know/clinical-effectiveness-terms   Copyright   Copyright © 2024 [employer] and its affiliates and/or licensors. All rights reserved.

## 2025-06-19 ENCOUNTER — TELEPHONE (OUTPATIENT)
Dept: BARIATRICS | Facility: CLINIC | Age: 50
End: 2025-06-19

## 2025-06-19 ENCOUNTER — TELEPHONE (OUTPATIENT)
Dept: FAMILY MEDICINE CLINIC | Facility: CLINIC | Age: 50
End: 2025-06-19

## 2025-06-19 NOTE — TELEPHONE ENCOUNTER
Hi, please schedule pt for nurse visit for BP check in the next 2 weeks.     Thanks,   DEX LopezC

## 2025-06-19 NOTE — TELEPHONE ENCOUNTER
6/19 left [facility] for patient to call back to schedule BP check with wt mgmt nurse

## 2025-06-19 NOTE — TELEPHONE ENCOUNTER
6/19/25 per Mel[facility] wt mgmt   call and schedule pt for wt mgmt BP check with nurse  Called and left msg for pt to call back to schedule that appt

## 2025-06-23 ENCOUNTER — TELEPHONE (OUTPATIENT)
Age: 50
End: 2025-06-23

## 2025-06-23 NOTE — TELEPHONE ENCOUNTER
Please call the patient to schedule a nurse visit for a BP check.    She is available this week early morning, but not Thursday (06/26).  She is available all next week.

## 2025-06-30 ENCOUNTER — CLINICAL SUPPORT (OUTPATIENT)
Age: 50
End: 2025-06-30

## 2025-06-30 VITALS
HEART RATE: 77 BPM | DIASTOLIC BLOOD PRESSURE: 76 MMHG | TEMPERATURE: 97.6 F | HEIGHT: 70 IN | BODY MASS INDEX: 25.4 KG/M2 | OXYGEN SATURATION: 98 % | SYSTOLIC BLOOD PRESSURE: 124 MMHG | WEIGHT: 177.4 LBS

## 2025-06-30 DIAGNOSIS — R63.5 ABNORMAL WEIGHT GAIN: Primary | ICD-10-CM

## 2025-06-30 PROCEDURE — RECHECK

## 2025-06-30 NOTE — PROGRESS NOTES
Patient last visit weight: 182  Patient current visit weight:    If you are taking phentermine or other oral weight loss medications, are you experiencing any of the following symptoms:  Headache: NO  Blurred Vision: NO  Chest Pain: NO  Palpitations: NO  Insomnia: NO  SPECIFY ORAL MEDICATION AND DOSAGE: PHENTERMINE 37.5    If you are taking an injectable medication,  are you experiencing any of the following symptoms:  Bloating:   Nausea:  Vomiting:   Constipation:   Diarrhea:  SPECIFY INJECTABLE MEDICATION AND CURRENT DOSAGE:  Vitals:    Is BP less than 100/60? NO  Is BP greater than 140/90? NO  Is HR greater than 100? NO  **If yes to any of the above, have patient relax and repeat in 5-10 minutes**    Repeat values:    Is BP less than 100/60?  Is BP greater than 140/90?  Is HR greater than 100?  **If values remain outside of ranges above, please consult provider for next steps**      Date of last injection:    How many injections do you have left:

## 2025-07-19 DIAGNOSIS — M54.50 LEFT LOW BACK PAIN, UNSPECIFIED CHRONICITY, UNSPECIFIED WHETHER SCIATICA PRESENT: ICD-10-CM

## 2025-07-21 RX ORDER — IBUPROFEN 600 MG/1
TABLET, FILM COATED ORAL
Qty: 90 TABLET | Refills: 1 | Status: SHIPPED | OUTPATIENT
Start: 2025-07-21

## 2025-07-24 DIAGNOSIS — E66.3 OVERWEIGHT: ICD-10-CM

## 2025-07-28 ENCOUNTER — OFFICE VISIT (OUTPATIENT)
Age: 50
End: 2025-07-28
Payer: COMMERCIAL

## 2025-07-28 VITALS
DIASTOLIC BLOOD PRESSURE: 76 MMHG | TEMPERATURE: 97.7 F | SYSTOLIC BLOOD PRESSURE: 114 MMHG | BODY MASS INDEX: 25.84 KG/M2 | HEIGHT: 70 IN | OXYGEN SATURATION: 97 % | WEIGHT: 180.5 LBS | HEART RATE: 86 BPM

## 2025-07-28 DIAGNOSIS — E66.3 OVERWEIGHT: Primary | ICD-10-CM

## 2025-07-28 PROCEDURE — 99214 OFFICE O/P EST MOD 30 MIN: CPT | Performed by: PHYSICIAN ASSISTANT

## 2025-07-28 RX ORDER — PHENTERMINE HYDROCHLORIDE 37.5 MG/1
37.5 TABLET ORAL EVERY MORNING
Qty: 30 TABLET | Refills: 1 | Status: SHIPPED | OUTPATIENT
Start: 2025-07-28

## 2025-07-28 RX ORDER — PHENTERMINE HYDROCHLORIDE 37.5 MG/1
37.5 TABLET ORAL EVERY MORNING
Qty: 30 TABLET | Refills: 0 | OUTPATIENT
Start: 2025-07-28

## (undated) DEVICE — SPINAL NEEDLE QUINCKE 25 G X 4.69

## (undated) DEVICE — CHLORAPREP HI-LITE 10.5ML ORANGE

## (undated) DEVICE — FLEXIBLE ADHESIVE BANDAGE,X-LARGE: Brand: CURITY

## (undated) DEVICE — GLOVE INDICATOR PI UNDERGLOVE SZ 8.5 BLUE

## (undated) DEVICE — TRAY EPIDURAL SINGLE SHOT

## (undated) DEVICE — SYRINGE 5ML LL

## (undated) DEVICE — DISPOSABLE OR TOWEL: Brand: CARDINAL HEALTH